# Patient Record
Sex: FEMALE | Race: WHITE | NOT HISPANIC OR LATINO | Employment: FULL TIME | URBAN - METROPOLITAN AREA
[De-identification: names, ages, dates, MRNs, and addresses within clinical notes are randomized per-mention and may not be internally consistent; named-entity substitution may affect disease eponyms.]

---

## 2017-09-13 ENCOUNTER — LAB (OUTPATIENT)
Dept: LAB | Facility: HOSPITAL | Age: 57
End: 2017-09-13

## 2017-09-13 ENCOUNTER — TRANSCRIBE ORDERS (OUTPATIENT)
Dept: LAB | Facility: HOSPITAL | Age: 57
End: 2017-09-13

## 2017-09-13 DIAGNOSIS — E78.00 PURE HYPERCHOLESTEROLEMIA: ICD-10-CM
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
## 2017-09-13 DIAGNOSIS — Q89.01 CONGENITAL ABSENCE OF SPLEEN: ICD-10-CM

## 2017-09-13 DIAGNOSIS — Z00.00 ROUTINE GENERAL MEDICAL EXAMINATION AT A HEALTH CARE FACILITY: ICD-10-CM

## 2017-09-13 DIAGNOSIS — I34.1 MITRAL VALVE PROLAPSE: ICD-10-CM

## 2017-09-13 LAB
ALBUMIN SERPL-MCNC: 4.4 G/DL (ref 3.2–4.8)
ALBUMIN/GLOB SERPL: 1.8 G/DL (ref 1.5–2.5)
ALP SERPL-CCNC: 122 U/L (ref 25–100)
ALT SERPL W P-5'-P-CCNC: 28 U/L (ref 7–40)
ANION GAP SERPL CALCULATED.3IONS-SCNC: 5 MMOL/L (ref 3–11)
ARTICHOKE IGE QN: 138 MG/DL (ref 0–130)
AST SERPL-CCNC: 22 U/L (ref 0–33)
BASOPHILS # BLD AUTO: 0.02 10*3/MM3 (ref 0–0.2)
BASOPHILS NFR BLD AUTO: 0.4 % (ref 0–1)
BILIRUB SERPL-MCNC: 0.9 MG/DL (ref 0.3–1.2)
BUN BLD-MCNC: 13 MG/DL (ref 9–23)
BUN/CREAT SERPL: 21.7 (ref 7–25)
CALCIUM SPEC-SCNC: 9.5 MG/DL (ref 8.7–10.4)
CHLORIDE SERPL-SCNC: 104 MMOL/L (ref 99–109)
CHOLEST SERPL-MCNC: 217 MG/DL (ref 0–200)
CO2 SERPL-SCNC: 29 MMOL/L (ref 20–31)
CREAT BLD-MCNC: 0.6 MG/DL (ref 0.6–1.3)
DEPRECATED RDW RBC AUTO: 49.4 FL (ref 37–54)
EOSINOPHIL # BLD AUTO: 0.11 10*3/MM3 (ref 0–0.3)
EOSINOPHIL NFR BLD AUTO: 2 % (ref 0–3)
ERYTHROCYTE [DISTWIDTH] IN BLOOD BY AUTOMATED COUNT: 15.4 % (ref 11.3–14.5)
GFR SERPL CREATININE-BSD FRML MDRD: 104 ML/MIN/1.73
GLOBULIN UR ELPH-MCNC: 2.5 GM/DL
GLUCOSE BLD-MCNC: 92 MG/DL (ref 70–100)
HCT VFR BLD AUTO: 39.8 % (ref 34.5–44)
HDLC SERPL-MCNC: 59 MG/DL (ref 40–60)
HGB BLD-MCNC: 12.7 G/DL (ref 11.5–15.5)
IMM GRANULOCYTES # BLD: 0.01 10*3/MM3 (ref 0–0.03)
IMM GRANULOCYTES NFR BLD: 0.2 % (ref 0–0.6)
LYMPHOCYTES # BLD AUTO: 2.93 10*3/MM3 (ref 0.6–4.8)
LYMPHOCYTES NFR BLD AUTO: 54.4 % (ref 24–44)
MCH RBC QN AUTO: 28 PG (ref 27–31)
MCHC RBC AUTO-ENTMCNC: 31.9 G/DL (ref 32–36)
MCV RBC AUTO: 87.9 FL (ref 80–99)
MONOCYTES # BLD AUTO: 0.63 10*3/MM3 (ref 0–1)
MONOCYTES NFR BLD AUTO: 11.7 % (ref 0–12)
NEUTROPHILS # BLD AUTO: 1.69 10*3/MM3 (ref 1.5–8.3)
NEUTROPHILS NFR BLD AUTO: 31.3 % (ref 41–71)
PLATELET # BLD AUTO: 428 10*3/MM3 (ref 150–450)
PMV BLD AUTO: 8.6 FL (ref 6–12)
POTASSIUM BLD-SCNC: 4.6 MMOL/L (ref 3.5–5.5)
PROT SERPL-MCNC: 6.9 G/DL (ref 5.7–8.2)
RBC # BLD AUTO: 4.53 10*6/MM3 (ref 3.89–5.14)
SODIUM BLD-SCNC: 138 MMOL/L (ref 132–146)
TRIGL SERPL-MCNC: 116 MG/DL (ref 0–150)
TSH SERPL DL<=0.05 MIU/L-ACNC: 2.12 MIU/ML (ref 0.35–5.35)
WBC NRBC COR # BLD: 5.39 10*3/MM3 (ref 3.5–10.8)

## 2017-09-13 PROCEDURE — 36415 COLL VENOUS BLD VENIPUNCTURE: CPT

## 2017-09-13 PROCEDURE — 84443 ASSAY THYROID STIM HORMONE: CPT

## 2017-09-13 PROCEDURE — 80061 LIPID PANEL: CPT

## 2017-09-13 PROCEDURE — 85025 COMPLETE CBC W/AUTO DIFF WBC: CPT

## 2017-09-13 PROCEDURE — 80053 COMPREHEN METABOLIC PANEL: CPT

## 2017-09-18 ENCOUNTER — TRANSCRIBE ORDERS (OUTPATIENT)
Dept: ADMINISTRATIVE | Facility: HOSPITAL | Age: 57
End: 2017-09-18

## 2017-09-18 DIAGNOSIS — M79.672 HEEL PAIN, BILATERAL: ICD-10-CM

## 2017-09-18 DIAGNOSIS — M79.671 PAIN IN BOTH FEET: Primary | ICD-10-CM

## 2017-09-18 DIAGNOSIS — M79.672 PAIN IN BOTH FEET: Primary | ICD-10-CM

## 2017-09-18 DIAGNOSIS — M79.671 HEEL PAIN, BILATERAL: ICD-10-CM

## 2017-09-29 ENCOUNTER — HOSPITAL ENCOUNTER (OUTPATIENT)
Dept: MRI IMAGING | Facility: HOSPITAL | Age: 57
Discharge: HOME | End: 2017-09-29
Attending: INTERNAL MEDICINE

## 2017-09-29 ENCOUNTER — HOSPITAL ENCOUNTER (OUTPATIENT)
Dept: MRI IMAGING | Facility: HOSPITAL | Age: 57
Discharge: HOME | End: 2017-09-29
Attending: INTERNAL MEDICINE | Admitting: INTERNAL MEDICINE

## 2017-09-29 DIAGNOSIS — M79.671 PAIN IN BOTH FEET: ICD-10-CM

## 2017-09-29 DIAGNOSIS — M79.672 HEEL PAIN, BILATERAL: ICD-10-CM

## 2017-09-29 DIAGNOSIS — M79.671 HEEL PAIN, BILATERAL: ICD-10-CM

## 2017-09-29 DIAGNOSIS — M79.672 PAIN IN BOTH FEET: ICD-10-CM

## 2017-09-29 PROCEDURE — 73718 MRI LOWER EXTREMITY W/O DYE: CPT

## 2017-11-06 ENCOUNTER — OFFICE VISIT (OUTPATIENT)
Dept: ORTHOPEDIC SURGERY | Facility: CLINIC | Age: 57
End: 2017-11-06

## 2017-11-06 VITALS
SYSTOLIC BLOOD PRESSURE: 131 MMHG | WEIGHT: 162 LBS | DIASTOLIC BLOOD PRESSURE: 92 MMHG | HEART RATE: 77 BPM | HEIGHT: 62 IN | BODY MASS INDEX: 29.81 KG/M2

## 2017-11-06 DIAGNOSIS — M79.671 FOOT PAIN, BILATERAL: ICD-10-CM

## 2017-11-06 DIAGNOSIS — M79.672 FOOT PAIN, BILATERAL: ICD-10-CM

## 2017-11-06 DIAGNOSIS — I87.8 VENOUS STASIS: Primary | ICD-10-CM

## 2017-11-06 PROCEDURE — 99213 OFFICE O/P EST LOW 20 MIN: CPT | Performed by: ORTHOPAEDIC SURGERY

## 2017-11-06 RX ORDER — RISEDRONATE SODIUM 150 MG/1
150 TABLET, FILM COATED ORAL
COMMUNITY
End: 2019-01-14 | Stop reason: SDUPTHER

## 2017-11-06 NOTE — PROGRESS NOTES
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  She is here noting that she still has a lot of pain in the posterior heels, cannot rest them on anything (such as in a recliner at night or in bed) as well as some generalized pain on the bottom of both feet.  She has good days and bad, worse with activity, better with rest.  She does not have the severe morning pain that she had initially.  She is still doing stretching, wears good shoes.  She is wondering if anything else can be done.  She has had an MRI of both feet, I looked at them.  They are basically normal, they show the subcutaneous edema as expected.  On the right there is a cyst in the neck of the calcaneus with some edema, I do not think this is causing symptoms, she does not report pain in that area.  (it is in the region of the sinus tarsi)    Current Outpatient Prescriptions on File Prior to Visit   Medication Sig Dispense Refill   • aspirin 81 MG tablet Take 81 mg by mouth Take As Directed.     • Cetirizine HCl (ZYRTEC ALLERGY) 10 MG capsule Take 10 mg by mouth Take As Directed.     • Coenzyme Q10 (COQ10) 200 MG capsule Take 200 mg by mouth Take As Directed.     • esomeprazole (nexIUM) 40 MG capsule Take 40 mg by mouth Take As Directed.     • MULTIPLE VITAMIN PO Take 1 tablet by mouth Take As Directed.     • nebivolol (BYSTOLIC) 10 MG tablet Take 10 mg by mouth Take As Directed.     • pravastatin (PRAVACHOL) 40 MG tablet Take 40 mg by mouth Take As Directed.     • VITAMIN D, CHOLECALCIFEROL, PO Take 5,000 Units by mouth Take As Directed.     • cephalexin (KEFLEX) 500 MG capsule Take 500 mg by mouth Take As Directed.      • sulfamethoxazole-trimethoprim (BACTRIM DS,SEPTRA DS) 800-160 MG per tablet Take 1 tablet by mouth Take As Directed.     • torsemide (DEMADEX) 10 MG tablet Take 10 mg by mouth Take As Directed.     • valACYclovir (VALTREX) 1000 MG tablet Take 1,000 mg by mouth Take As Directed.       No current facility-administered medications on file prior to visit.       Allergies   Allergen Reactions   • Dexlansoprazole    • Bactrim [Sulfamethoxazole-Trimethoprim] Itching and Rash   • Other Itching and Rash     Kapidex      Past Medical History:   Diagnosis Date   • Achilles tendonitis    • Cancer    • Esophagitis    • Gastrointestinal disorder    • Hepatitis    • Osteoarthritis    • Osteoporosis    • Plantar fasciitis    • Skin disorder    • Splenic infarct      Past Surgical History:   Procedure Laterality Date   •  SECTION     • CHOLECYSTECTOMY     • HYSTERECTOMY     • LAPAROSCOPIC TUBAL LIGATION     • SPLENECTOMY       Family History   Problem Relation Age of Onset   • Heart attack Mother    • Hypertension Mother    • Stroke Mother    • Stroke Father    • Heart attack Other    • Heart disease Other    • Stroke Other    • Depression Other    • Hypertension Other       Social History     Social History   • Marital status:      Spouse name: N/A   • Number of children: N/A   • Years of education: N/A     Occupational History   • Not on file.     Social History Main Topics   • Smoking status: Former Smoker     Packs/day: 0.50     Years: 10.00   • Smokeless tobacco: Never Used   • Alcohol use No   • Drug use: No   • Sexual activity: Not on file     Other Topics Concern   • Not on file     Social History Narrative        Review of Systems   Constitutional: Positive for fatigue.   HENT: Negative.    Eyes: Negative.    Respiratory: Negative.    Cardiovascular: Negative.    Gastrointestinal: Negative.    Endocrine: Negative.    Genitourinary: Negative.    Musculoskeletal: Positive for arthralgias.   Skin: Negative.   "  Allergic/Immunologic: Positive for environmental allergies.   Neurological: Negative.    Hematological: Negative.    Psychiatric/Behavioral: Negative.        The following portions of the patient's history were reviewed and updated as appropriate: allergies, current medications, past family history, past medical history, past social history, past surgical history and problem list.    Physical Exam:   /92  Pulse 77  Ht 61.75\" (156.8 cm)  Wt 162 lb (73.5 kg)  BMI 29.87 kg/m2  GENERAL: Body habitus: overweight    Lower extremity edema: Left: 1+ pitting; Right: 1+ pitting    Varicose veins:  Left: mild; Right: mild    Gait: normal     Mental Status:  awake and alert; oriented to person, place, and time    Voice:  clear  SKIN:  Normal    Hair Growth:  Right:normal; Left:  normal  NAILS: Toenails: normal    CV:  Dorsalis Pedis:  Right: 2+; Left:2+    Posterior Tibial: Right:2+; Left:2+    Capillary Refill:  Brisk  MSK:      Ankle:  Right: non tender; Left:  non tender      Foot:  Right:  non tender except at the posterior heel, but no swelling, no bursitis; Left:  non tender  except at the posterior heel, but no swelling, no bursitis;      NEURO:   Lower extremity sensation: intact            Medical Decision Making    Data Review:   reviewed radiology images    and reviewed radiology results       Assessment and Plan/ Diagnosis/Treatment options:   1. Venous stasis  I think this plays a role in the generalized pain, she should definitely continue to wear support stockings    2. Foot pain, bilateral  She does not seem to have plantar fasciitis right now.  It is reassuring that the MRI's are normal.  I think the posterior heel pain is irritation of the insertion of the Achilles but without full-blown tendinitis, no swelling, no bursitis.  I suggested she order \"Heelbows\" from [employer], which can pad and protect the area.  We also discussed shoes, open backed type, etc.  We also discussed weight loss, which might " "help.   I do not know of anything else that can change this for her.  There is no surgery that will be beneficial, and I do not think orthotics will help.I would recommend she continue to stretch.  She asked why she hurts and other people don't, and I explained we cannot always identify nor \"cure\" tenderness.  At least we can do studies to make sure there is no malignant or dangerous source for pain.   We discussed all of the above in detail, I will be happy to see her any time.                        "

## 2017-12-15 ENCOUNTER — HOSPITAL ENCOUNTER (OUTPATIENT)
Dept: GENERAL RADIOLOGY | Facility: HOSPITAL | Age: 57
Discharge: HOME | End: 2017-12-15
Admitting: NURSE PRACTITIONER

## 2017-12-15 ENCOUNTER — TRANSCRIBE ORDERS (OUTPATIENT)
Dept: ADMINISTRATIVE | Facility: HOSPITAL | Age: 57
End: 2017-12-15

## 2017-12-15 DIAGNOSIS — M54.6 THORACIC SPINE PAIN: Primary | ICD-10-CM

## 2017-12-15 PROCEDURE — 72072 X-RAY EXAM THORAC SPINE 3VWS: CPT

## 2017-12-21 ENCOUNTER — TRANSCRIBE ORDERS (OUTPATIENT)
Dept: ADMINISTRATIVE | Facility: HOSPITAL | Age: 57
End: 2017-12-21

## 2017-12-21 DIAGNOSIS — M54.10 RADICULOPATHY AFFECTING UPPER EXTREMITY: ICD-10-CM

## 2017-12-21 DIAGNOSIS — R07.9 CHEST PAIN, UNSPECIFIED TYPE: Primary | ICD-10-CM

## 2017-12-28 ENCOUNTER — HOSPITAL ENCOUNTER (OUTPATIENT)
Dept: MRI IMAGING | Facility: HOSPITAL | Age: 57
Discharge: HOME | End: 2017-12-28
Admitting: NURSE PRACTITIONER

## 2017-12-28 DIAGNOSIS — M54.10 RADICULOPATHY AFFECTING UPPER EXTREMITY: ICD-10-CM

## 2017-12-28 DIAGNOSIS — R07.9 CHEST PAIN, UNSPECIFIED TYPE: ICD-10-CM

## 2017-12-28 PROCEDURE — 72146 MRI CHEST SPINE W/O DYE: CPT

## 2018-04-11 ENCOUNTER — APPOINTMENT (OUTPATIENT)
Dept: LAB | Facility: HOSPITAL | Age: 58
End: 2018-04-11

## 2018-04-11 ENCOUNTER — OFFICE VISIT (OUTPATIENT)
Dept: NEUROSURGERY | Facility: CLINIC | Age: 58
End: 2018-04-11

## 2018-04-11 VITALS — BODY MASS INDEX: 26.46 KG/M2 | WEIGHT: 143.8 LBS | TEMPERATURE: 97.8 F | HEIGHT: 62 IN

## 2018-04-11 DIAGNOSIS — M54.9 MECHANICAL BACK PAIN: Primary | ICD-10-CM

## 2018-04-11 DIAGNOSIS — M95.9: ICD-10-CM

## 2018-04-11 DIAGNOSIS — M53.9 MULTILEVEL DEGENERATIVE DISC DISEASE: ICD-10-CM

## 2018-04-11 DIAGNOSIS — R19.7 DIARRHEA, UNSPECIFIED TYPE: ICD-10-CM

## 2018-04-11 DIAGNOSIS — D73.5 SPLENIC INFARCT: ICD-10-CM

## 2018-04-11 DIAGNOSIS — D18.09 HEMANGIOMA OF VERTEBRAL BODY: ICD-10-CM

## 2018-04-11 DIAGNOSIS — R10.30 LOWER ABDOMINAL PAIN: ICD-10-CM

## 2018-04-11 DIAGNOSIS — K50.019 TERMINAL ILEITIS WITH COMPLICATION (HCC): Primary | ICD-10-CM

## 2018-04-11 PROCEDURE — 99243 OFF/OP CNSLTJ NEW/EST LOW 30: CPT | Performed by: NEUROLOGICAL SURGERY

## 2018-04-11 PROCEDURE — 36415 COLL VENOUS BLD VENIPUNCTURE: CPT

## 2018-04-11 RX ORDER — CYCLOBENZAPRINE HCL 10 MG
10 TABLET ORAL 2 TIMES DAILY PRN
COMMUNITY
End: 2019-01-23

## 2018-04-11 RX ORDER — CLOPIDOGREL BISULFATE 75 MG/1
75 TABLET ORAL DAILY
COMMUNITY
End: 2019-06-07 | Stop reason: SDUPTHER

## 2018-04-11 NOTE — PROGRESS NOTES
Patient: Antonieta Howe  : 1960    Primary Care Provider: Tavo Valderrama MD    Requesting Provider: As above        History    Chief Complaint: Mid back pain.    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  She had severe pain at her bra line and it took 2 months to recover from that.  If she uses her arms then her pain tends to flareup even at this point in time.  She had a severe acute episode in May of last year and then another in 2017.  These spells took 2-4 weeks to settle down.  Sometimes she gets some numbness in her arms at night.  She has no chest or abdominal symptoms.  She denies leg symptoms.  She's better lying down and stretching.  Any use of her arms with lifting or twisting accentuates her symptoms.  She does have a history of osteoporosis.    Review of Systems   Constitutional: Negative for activity change, appetite change, chills, diaphoresis, fatigue, fever and unexpected weight change.   HENT: Positive for congestion and postnasal drip. Negative for dental problem, drooling, ear discharge, ear pain, facial swelling, hearing loss, mouth sores, nosebleeds, rhinorrhea, sinus pressure, sneezing, sore throat, tinnitus, trouble swallowing and voice change.    Eyes: Negative for photophobia, pain, discharge, redness, itching and visual disturbance.   Respiratory: Positive for cough. Negative for apnea, choking, chest tightness, shortness of breath, wheezing and stridor.    Cardiovascular: Positive for palpitations. Negative for chest pain and leg swelling.   Gastrointestinal: Positive for abdominal pain. Negative for abdominal distention, anal bleeding, blood in stool, constipation, diarrhea, nausea, rectal pain and vomiting.   Endocrine: Negative for cold intolerance, heat intolerance, polydipsia, polyphagia and polyuria.   Genitourinary: Negative for decreased urine  "volume, difficulty urinating, dysuria, enuresis, flank pain, frequency, genital sores, hematuria and urgency.   Musculoskeletal: Positive for arthralgias, back pain, joint swelling and myalgias. Negative for gait problem, neck pain and neck stiffness.   Skin: Negative for color change, pallor, rash and wound.   Allergic/Immunologic: Positive for environmental allergies. Negative for food allergies and immunocompromised state.   Neurological: Negative for dizziness, tremors, seizures, syncope, facial asymmetry, speech difficulty, weakness, light-headedness, numbness and headaches.   Hematological: Negative for adenopathy. Does not bruise/bleed easily.   Psychiatric/Behavioral: Negative for agitation, behavioral problems, confusion, decreased concentration, dysphoric mood, hallucinations, self-injury, sleep disturbance and suicidal ideas. The patient is not nervous/anxious and is not hyperactive.        The patient's past medical history, past surgical history, family history, and social history have been reviewed at length in the electronic medical record.    Physical Exam:   Temp 97.8 °F (36.6 °C) (Temporal Artery )   Ht 156.2 cm (61.5\")   Wt 65.2 kg (143 lb 12.8 oz)   BMI 26.73 kg/m²   CONSTITUTIONAL: Patient is well-nourished, pleasant and appears stated age.  CV: Heart regular rate and rhythm without murmur, rub, or gallop.  PULMONARY: Lungs are clear to ascultation.  MUSCULOSKELETAL:  Neck tenderness to palpation is not observed.  There is no tenderness to palpation in her mid back.  ROM in neck is normal.  NEUROLOGICAL:  Orientation, memory, attention span, language function, and cognition have been examined and are intact.  Strength is intact in the upper and lower extremities to direct testing.  Muscle tone is normal throughout.  Station and gait are normal.  Sensation is intact to light touch testing throughout.  Deep tendon reflexes are 1+ and symmetrical.  July's Sign is negative bilaterally. No " clonus is elicited.  Coordination is intact.      Medical Decision Making    Data Review:   MRI of the thoracic spine demonstrates an old wedge deformity of T7.  There is a defect in the upper endplate of T7 that could represent a Schmorl's node or potentially sequela of the fracture.  There is a hemangioma within the T8 vertebral body.    Diagnosis:   I suspect that the patient fractured her T7 vertebrae during the incident of January 2015.  She has some ongoing mechanical back pain.    Treatment Options:   She is already doing some home stretches and exercises and limiting her activity as necessary.  Unfortunately I don't think there is a simple solution for what afflicts her.  Treatment will need to remain symptomatic.       Diagnosis Plan   1. Mechanical back pain     2. Fracture deformity      T7   3. Hemangioma of vertebral body     4. Multilevel degenerative disc disease         Scribed for Sumeet Herron MD by Anat Hernandez CMA on 04/11/2018 at 1:53 PM    I, Dr. Herron, personally performed the services described in the documentation, as scribed in my presence, and it is both accurate and complete.

## 2018-04-19 LAB — REF LAB TEST METHOD: NORMAL

## 2018-05-01 ENCOUNTER — TRANSCRIBE ORDERS (OUTPATIENT)
Dept: MAMMOGRAPHY | Facility: HOSPITAL | Age: 58
End: 2018-05-01

## 2018-05-01 DIAGNOSIS — Z12.31 VISIT FOR SCREENING MAMMOGRAM: Primary | ICD-10-CM

## 2018-05-04 ENCOUNTER — APPOINTMENT (OUTPATIENT)
Dept: MAMMOGRAPHY | Facility: HOSPITAL | Age: 58
End: 2018-05-04
Attending: INTERNAL MEDICINE

## 2018-05-24 ENCOUNTER — APPOINTMENT (OUTPATIENT)
Dept: MAMMOGRAPHY | Facility: HOSPITAL | Age: 58
End: 2018-05-24
Attending: INTERNAL MEDICINE

## 2018-06-08 ENCOUNTER — HOSPITAL ENCOUNTER (OUTPATIENT)
Dept: MAMMOGRAPHY | Facility: HOSPITAL | Age: 58
Discharge: HOME | End: 2018-06-08
Attending: INTERNAL MEDICINE | Admitting: INTERNAL MEDICINE

## 2018-06-08 DIAGNOSIS — Z12.31 VISIT FOR SCREENING MAMMOGRAM: ICD-10-CM

## 2018-06-08 PROCEDURE — 77063 BREAST TOMOSYNTHESIS BI: CPT

## 2018-06-08 PROCEDURE — 77063 BREAST TOMOSYNTHESIS BI: CPT | Performed by: RADIOLOGY

## 2018-06-08 PROCEDURE — 77067 SCR MAMMO BI INCL CAD: CPT

## 2018-06-08 PROCEDURE — 77067 SCR MAMMO BI INCL CAD: CPT | Performed by: RADIOLOGY

## 2018-12-13 ENCOUNTER — LAB (OUTPATIENT)
Dept: LAB | Facility: HOSPITAL | Age: 58
End: 2018-12-13

## 2018-12-13 ENCOUNTER — OFFICE VISIT (OUTPATIENT)
Dept: FAMILY MEDICINE CLINIC | Facility: CLINIC | Age: 58
End: 2018-12-13

## 2018-12-13 ENCOUNTER — HOSPITAL ENCOUNTER (OUTPATIENT)
Dept: GENERAL RADIOLOGY | Facility: HOSPITAL | Age: 58
Discharge: HOME | End: 2018-12-13
Admitting: INTERNAL MEDICINE

## 2018-12-13 ENCOUNTER — TELEPHONE (OUTPATIENT)
Dept: FAMILY MEDICINE CLINIC | Facility: CLINIC | Age: 58
End: 2018-12-13

## 2018-12-13 VITALS
BODY MASS INDEX: 24.55 KG/M2 | TEMPERATURE: 98.8 F | SYSTOLIC BLOOD PRESSURE: 122 MMHG | HEIGHT: 62 IN | DIASTOLIC BLOOD PRESSURE: 84 MMHG | OXYGEN SATURATION: 95 % | HEART RATE: 88 BPM | WEIGHT: 133.4 LBS

## 2018-12-13 DIAGNOSIS — J02.9 SORE THROAT: ICD-10-CM

## 2018-12-13 DIAGNOSIS — Z00.00 PREVENTATIVE HEALTH CARE: ICD-10-CM

## 2018-12-13 DIAGNOSIS — J40 BRONCHITIS: ICD-10-CM

## 2018-12-13 DIAGNOSIS — R50.9 FEVER AND CHILLS: ICD-10-CM

## 2018-12-13 DIAGNOSIS — J40 BRONCHITIS: Primary | ICD-10-CM

## 2018-12-13 LAB
25(OH)D3 SERPL-MCNC: 53.7 NG/ML
ALBUMIN SERPL-MCNC: 4.96 G/DL (ref 3.2–4.8)
ALBUMIN/GLOB SERPL: 2.6 G/DL (ref 1.5–2.5)
ALP SERPL-CCNC: 95 U/L (ref 25–100)
ALT SERPL W P-5'-P-CCNC: 30 U/L (ref 7–40)
ANION GAP SERPL CALCULATED.3IONS-SCNC: 9 MMOL/L (ref 3–11)
ARTICHOKE IGE QN: 92 MG/DL (ref 0–130)
AST SERPL-CCNC: 23 U/L (ref 0–33)
BASOPHILS # BLD AUTO: 0.03 10*3/MM3 (ref 0–0.2)
BASOPHILS NFR BLD AUTO: 0.3 % (ref 0–1)
BILIRUB SERPL-MCNC: 0.8 MG/DL (ref 0.3–1.2)
BUN BLD-MCNC: 11 MG/DL (ref 9–23)
BUN/CREAT SERPL: 15.7 (ref 7–25)
CALCIUM SPEC-SCNC: 9.8 MG/DL (ref 8.7–10.4)
CHLORIDE SERPL-SCNC: 103 MMOL/L (ref 99–109)
CHOLEST SERPL-MCNC: 169 MG/DL (ref 0–200)
CO2 SERPL-SCNC: 28 MMOL/L (ref 20–31)
CREAT BLD-MCNC: 0.7 MG/DL (ref 0.6–1.3)
DEPRECATED RDW RBC AUTO: 47.7 FL (ref 37–54)
EOSINOPHIL # BLD AUTO: 0.15 10*3/MM3 (ref 0–0.3)
EOSINOPHIL NFR BLD AUTO: 1.3 % (ref 0–3)
ERYTHROCYTE [DISTWIDTH] IN BLOOD BY AUTOMATED COUNT: 14.2 % (ref 11.3–14.5)
EXPIRATION DATE: NORMAL
EXPIRATION DATE: NORMAL
FLUAV AG NPH QL: NEGATIVE
FLUBV AG NPH QL: NEGATIVE
GFR SERPL CREATININE-BSD FRML MDRD: 87 ML/MIN/1.73
GLOBULIN UR ELPH-MCNC: 1.9 GM/DL
GLUCOSE BLD-MCNC: 87 MG/DL (ref 70–100)
HBA1C MFR BLD: 5.4 % (ref 4.8–5.6)
HCT VFR BLD AUTO: 43 % (ref 34.5–44)
HDLC SERPL-MCNC: 67 MG/DL (ref 40–60)
HGB BLD-MCNC: 13.9 G/DL (ref 11.5–15.5)
IMM GRANULOCYTES # BLD: 0.03 10*3/MM3 (ref 0–0.03)
IMM GRANULOCYTES NFR BLD: 0.3 % (ref 0–0.6)
INTERNAL CONTROL: NORMAL
INTERNAL CONTROL: NORMAL
LYMPHOCYTES # BLD AUTO: 4.45 10*3/MM3 (ref 0.6–4.8)
LYMPHOCYTES NFR BLD AUTO: 37.9 % (ref 24–44)
Lab: NORMAL
Lab: NORMAL
MCH RBC QN AUTO: 29.4 PG (ref 27–31)
MCHC RBC AUTO-ENTMCNC: 32.3 G/DL (ref 32–36)
MCV RBC AUTO: 91.1 FL (ref 80–99)
MONOCYTES # BLD AUTO: 1.43 10*3/MM3 (ref 0–1)
MONOCYTES NFR BLD AUTO: 12.2 % (ref 0–12)
NEUTROPHILS # BLD AUTO: 5.66 10*3/MM3 (ref 1.5–8.3)
NEUTROPHILS NFR BLD AUTO: 48 % (ref 41–71)
PLATELET # BLD AUTO: 434 10*3/MM3 (ref 150–450)
PMV BLD AUTO: 8.7 FL (ref 6–12)
POTASSIUM BLD-SCNC: 3.9 MMOL/L (ref 3.5–5.5)
PROT SERPL-MCNC: 6.9 G/DL (ref 5.7–8.2)
RBC # BLD AUTO: 4.72 10*6/MM3 (ref 3.89–5.14)
S PYO AG THROAT QL: NEGATIVE
SODIUM BLD-SCNC: 140 MMOL/L (ref 132–146)
TRIGL SERPL-MCNC: 120 MG/DL (ref 0–150)
WBC NRBC COR # BLD: 11.75 10*3/MM3 (ref 3.5–10.8)

## 2018-12-13 PROCEDURE — 87804 INFLUENZA ASSAY W/OPTIC: CPT | Performed by: INTERNAL MEDICINE

## 2018-12-13 PROCEDURE — 71046 X-RAY EXAM CHEST 2 VIEWS: CPT

## 2018-12-13 PROCEDURE — 82306 VITAMIN D 25 HYDROXY: CPT | Performed by: INTERNAL MEDICINE

## 2018-12-13 PROCEDURE — 80053 COMPREHEN METABOLIC PANEL: CPT | Performed by: INTERNAL MEDICINE

## 2018-12-13 PROCEDURE — 83036 HEMOGLOBIN GLYCOSYLATED A1C: CPT | Performed by: INTERNAL MEDICINE

## 2018-12-13 PROCEDURE — 80061 LIPID PANEL: CPT | Performed by: INTERNAL MEDICINE

## 2018-12-13 PROCEDURE — 99204 OFFICE O/P NEW MOD 45 MIN: CPT | Performed by: INTERNAL MEDICINE

## 2018-12-13 PROCEDURE — 87880 STREP A ASSAY W/OPTIC: CPT | Performed by: INTERNAL MEDICINE

## 2018-12-13 PROCEDURE — 85025 COMPLETE CBC W/AUTO DIFF WBC: CPT | Performed by: INTERNAL MEDICINE

## 2018-12-13 RX ORDER — AZITHROMYCIN 250 MG/1
TABLET, FILM COATED ORAL
Qty: 6 TABLET | Refills: 0 | Status: SHIPPED | OUTPATIENT
Start: 2018-12-13 | End: 2019-01-14

## 2018-12-13 RX ORDER — GUAIFENESIN AND DEXTROMETHORPHAN HYDROBROMIDE 600; 30 MG/1; MG/1
2 TABLET, EXTENDED RELEASE ORAL EVERY 12 HOURS SCHEDULED
Qty: 28 TABLET | Refills: 0 | Status: SHIPPED | OUTPATIENT
Start: 2018-12-13 | End: 2018-12-20

## 2018-12-13 RX ORDER — ALBUTEROL SULFATE 90 UG/1
2 AEROSOL, METERED RESPIRATORY (INHALATION) EVERY 4 HOURS PRN
COMMUNITY
End: 2022-02-10 | Stop reason: SDUPTHER

## 2018-12-13 NOTE — PROGRESS NOTES
Chief Complaint:  Sore throat, cough, asthma    HPI:  Antonieta Howe is a 58 y.o. female who presents today for 1 week sore throat, SOB, cough and wheezing. She has a history of asthma but does not take any inhalers unless she is sick. She was prescribed steroids last Friday and has not felt any better since then. She has been taking mucinex daily. She has chills at home but no documented fevers. She is concerned that she needs antibiotics.     ROS:  Constitutional: no fevers, night sweats or unexplained weight loss  Eyes: no vision changes  ENT: no runny nose, ear pain, +sore throat  Cardio: no chest pain, palpitations  Pulm: +shortness of breath, +wheezing, +cough  GI: no abdominal pain or changes in bowel movements  : no difficulty urinating  MSK: no difficulty ambulating, no joint pain  Neuro: no weakness, dizziness or headache  Psych: no trouble sleeping  Endo: no change in appetite      Past Medical History:   Diagnosis Date   • Achilles tendonitis    • Cancer (CMS/HCC)    • Esophagitis    • Gastrointestinal disorder    • Hepatitis    • Hyperlipidemia    • Osteoarthritis    • Osteoporosis    • Plantar fasciitis    • Skin disorder    • Splenic infarct       Family History   Problem Relation Age of Onset   • Heart attack Mother    • Hypertension Mother    • Stroke Mother    • Stroke Father    • Heart attack Other    • Heart disease Other    • Stroke Other    • Depression Other    • Hypertension Other    • Breast cancer Neg Hx    • Ovarian cancer Neg Hx       Social History     Socioeconomic History   • Marital status:      Spouse name: Not on file   • Number of children: Not on file   • Years of education: Not on file   • Highest education level: Not on file   Social Needs   • Financial resource strain: Not on file   • Food insecurity - worry: Not on file   • Food insecurity - inability: Not on file   • Transportation needs - medical: Not on file   • Transportation needs - non-medical: Not on file    Occupational History   • Not on file   Tobacco Use   • Smoking status: Former Smoker     Packs/day: 0.50     Years: 10.00     Pack years: 5.00     Last attempt to quit:      Years since quittin.9   • Smokeless tobacco: Never Used   Substance and Sexual Activity   • Alcohol use: No   • Drug use: No   • Sexual activity: Defer   Other Topics Concern   • Not on file   Social History Narrative   • Not on file      Allergies   Allergen Reactions   • Dexlansoprazole    • Bactrim [Sulfamethoxazole-Trimethoprim] Itching and Rash   • Other Itching and Rash     Kapidex        There is no immunization history on file for this patient.     PE:  Vitals:    18 1132   BP: 122/84   Pulse: 88   Temp: 98.8 °F (37.1 °C)   SpO2: 95%        Gen Appearance: NAD  HEENT: Normocephalic, PERRLA, no thyromegaly, trache midline, erythematous pharynx, no exudates  Heart: RRR, normal S1 and S2, no murmur  Lungs: CTA b/l, no wheezing, no crackles  Abdomen: Soft, non-tender, non-distended, no guarding and BSx4  MSK: Moves all extremities well, normal gait, no peripheral edema  Pulses: Palpable and equal b/l  Lymph nodes: No palpable lymphadenopathy   Neuro: No focal deficits      Current Outpatient Medications   Medication Sig Dispense Refill   • albuterol 108 (90 Base) MCG/ACT inhaler Inhale 2 puffs Every 4 (Four) Hours As Needed for Wheezing.     • clopidogrel (PLAVIX) 75 MG tablet Take 75 mg by mouth Daily.     • Coenzyme Q10 (COQ10) 200 MG capsule Take 200 mg by mouth Take As Directed.     • esomeprazole (nexIUM) 40 MG capsule Take 40 mg by mouth Take As Directed.     • MULTIPLE VITAMIN PO Take 1 tablet by mouth Take As Directed.     • nebivolol (BYSTOLIC) 10 MG tablet Take 10 mg by mouth Take As Directed.     • pravastatin (PRAVACHOL) 40 MG tablet Take 40 mg by mouth Take As Directed.     • risedronate (ACTONEL) 150 MG tablet Take 150 mg by mouth Every 30 (Thirty) Days. with water on empty stomach, nothing by mouth or lie down  for next 30 minutes.     • torsemide (DEMADEX) 10 MG tablet Take 10 mg by mouth Take As Directed.     • VITAMIN D, CHOLECALCIFEROL, PO Take 5,000 Units by mouth Take As Directed.     • azithromycin (ZITHROMAX) 250 MG tablet Take 2 tablets the first day, then 1 tablet daily for 4 days. 6 tablet 0   • cephalexin (KEFLEX) 500 MG capsule Take 500 mg by mouth Take As Directed.     • Cetirizine HCl (ZYRTEC ALLERGY) 10 MG capsule Take 10 mg by mouth Take As Directed.     • cyclobenzaprine (FLEXERIL) 10 MG tablet Take 10 mg by mouth 2 (Two) Times a Day As Needed for Muscle Spasms.     • guaifenesin-dextromethorphan (MUCINEX DM)  MG tablet sustained-release 12 hour tablet Take 2 tablets by mouth Every 12 (Twelve) Hours for 7 days. 28 tablet 0   • sulfamethoxazole-trimethoprim (BACTRIM DS,SEPTRA DS) 800-160 MG per tablet Take 1 tablet by mouth Take As Directed.     • valACYclovir (VALTREX) 1000 MG tablet Take 1,000 mg by mouth Take As Directed.       No current facility-administered medications for this visit.         Antonieta was seen today for establish care and possible bronchitis.    Diagnoses and all orders for this visit:    Bronchitis  -     XR Chest PA & Lateral; Future  -     CBC & Differential; Future  -     azithromycin (ZITHROMAX) 250 MG tablet; Take 2 tablets the first day, then 1 tablet daily for 4 days.  -     guaifenesin-dextromethorphan (MUCINEX DM)  MG tablet sustained-release 12 hour tablet; Take 2 tablets by mouth Every 12 (Twelve) Hours for 7 days.  Check CXR, cont steroids, cover for atypical pneumonia with azitho. Checking lab work today. RTC 1 week if no better.  Preventative health care  -     Comprehensive Metabolic Panel; Future  -     Lipid Panel; Future  -     Vitamin D 25 Hydroxy; Future  -     Hemoglobin A1c; Future  Checking screening labs before physcial next months.        Return in about 1 month (around 1/13/2019).

## 2018-12-14 DIAGNOSIS — J45.909 ASTHMA, UNSPECIFIED ASTHMA SEVERITY, UNSPECIFIED WHETHER COMPLICATED, UNSPECIFIED WHETHER PERSISTENT: Primary | ICD-10-CM

## 2019-01-14 ENCOUNTER — LAB (OUTPATIENT)
Dept: LAB | Facility: HOSPITAL | Age: 59
End: 2019-01-14

## 2019-01-14 ENCOUNTER — OFFICE VISIT (OUTPATIENT)
Dept: FAMILY MEDICINE CLINIC | Facility: CLINIC | Age: 59
End: 2019-01-14

## 2019-01-14 VITALS
BODY MASS INDEX: 24.11 KG/M2 | TEMPERATURE: 97.8 F | DIASTOLIC BLOOD PRESSURE: 74 MMHG | OXYGEN SATURATION: 99 % | SYSTOLIC BLOOD PRESSURE: 118 MMHG | HEIGHT: 62 IN | WEIGHT: 131 LBS

## 2019-01-14 DIAGNOSIS — M81.0 OSTEOPOROSIS, UNSPECIFIED OSTEOPOROSIS TYPE, UNSPECIFIED PATHOLOGICAL FRACTURE PRESENCE: ICD-10-CM

## 2019-01-14 DIAGNOSIS — K57.92 DIVERTICULITIS: Primary | ICD-10-CM

## 2019-01-14 DIAGNOSIS — K57.92 DIVERTICULITIS: ICD-10-CM

## 2019-01-14 LAB
ALBUMIN SERPL-MCNC: 4.56 G/DL (ref 3.2–4.8)
ALBUMIN/GLOB SERPL: 2.5 G/DL (ref 1.5–2.5)
ALP SERPL-CCNC: 98 U/L (ref 25–100)
ALT SERPL W P-5'-P-CCNC: 35 U/L (ref 7–40)
ANION GAP SERPL CALCULATED.3IONS-SCNC: 5 MMOL/L (ref 3–11)
AST SERPL-CCNC: 25 U/L (ref 0–33)
BASOPHILS # BLD AUTO: 0.04 10*3/MM3 (ref 0–0.2)
BASOPHILS NFR BLD AUTO: 0.7 % (ref 0–1)
BILIRUB SERPL-MCNC: 1 MG/DL (ref 0.3–1.2)
BUN BLD-MCNC: 12 MG/DL (ref 9–23)
BUN/CREAT SERPL: 17.4 (ref 7–25)
CALCIUM SPEC-SCNC: 9.7 MG/DL (ref 8.7–10.4)
CHLORIDE SERPL-SCNC: 104 MMOL/L (ref 99–109)
CO2 SERPL-SCNC: 29 MMOL/L (ref 20–31)
CREAT BLD-MCNC: 0.69 MG/DL (ref 0.6–1.3)
DEPRECATED RDW RBC AUTO: 47.4 FL (ref 37–54)
EOSINOPHIL # BLD AUTO: 0.05 10*3/MM3 (ref 0–0.3)
EOSINOPHIL NFR BLD AUTO: 0.9 % (ref 0–3)
ERYTHROCYTE [DISTWIDTH] IN BLOOD BY AUTOMATED COUNT: 14.1 % (ref 11.3–14.5)
GFR SERPL CREATININE-BSD FRML MDRD: 88 ML/MIN/1.73
GLOBULIN UR ELPH-MCNC: 1.8 GM/DL
GLUCOSE BLD-MCNC: 88 MG/DL (ref 70–100)
HCT VFR BLD AUTO: 43.8 % (ref 34.5–44)
HGB BLD-MCNC: 14 G/DL (ref 11.5–15.5)
IMM GRANULOCYTES # BLD AUTO: 0 10*3/MM3 (ref 0–0.03)
IMM GRANULOCYTES NFR BLD AUTO: 0 % (ref 0–0.6)
LYMPHOCYTES # BLD AUTO: 2.87 10*3/MM3 (ref 0.6–4.8)
LYMPHOCYTES NFR BLD AUTO: 51.5 % (ref 24–44)
MCH RBC QN AUTO: 29.3 PG (ref 27–31)
MCHC RBC AUTO-ENTMCNC: 32 G/DL (ref 32–36)
MCV RBC AUTO: 91.6 FL (ref 80–99)
MONOCYTES # BLD AUTO: 0.68 10*3/MM3 (ref 0–1)
MONOCYTES NFR BLD AUTO: 12.2 % (ref 0–12)
NEUTROPHILS # BLD AUTO: 1.93 10*3/MM3 (ref 1.5–8.3)
NEUTROPHILS NFR BLD AUTO: 34.7 % (ref 41–71)
PLATELET # BLD AUTO: 413 10*3/MM3 (ref 150–450)
PMV BLD AUTO: 8.7 FL (ref 6–12)
POTASSIUM BLD-SCNC: 4.1 MMOL/L (ref 3.5–5.5)
PROT SERPL-MCNC: 6.4 G/DL (ref 5.7–8.2)
RBC # BLD AUTO: 4.78 10*6/MM3 (ref 3.89–5.14)
SODIUM BLD-SCNC: 138 MMOL/L (ref 132–146)
WBC NRBC COR # BLD: 5.57 10*3/MM3 (ref 3.5–10.8)

## 2019-01-14 PROCEDURE — 36415 COLL VENOUS BLD VENIPUNCTURE: CPT

## 2019-01-14 PROCEDURE — 85025 COMPLETE CBC W/AUTO DIFF WBC: CPT | Performed by: INTERNAL MEDICINE

## 2019-01-14 PROCEDURE — 80053 COMPREHEN METABOLIC PANEL: CPT | Performed by: INTERNAL MEDICINE

## 2019-01-14 PROCEDURE — 99213 OFFICE O/P EST LOW 20 MIN: CPT | Performed by: INTERNAL MEDICINE

## 2019-01-14 RX ORDER — RISEDRONATE SODIUM 150 MG/1
150 TABLET, FILM COATED ORAL
Qty: 3 TABLET | Refills: 1 | Status: SHIPPED | OUTPATIENT
Start: 2019-01-14 | End: 2019-06-07 | Stop reason: SDUPTHER

## 2019-01-14 RX ORDER — AMOXICILLIN AND CLAVULANATE POTASSIUM 875; 125 MG/1; MG/1
1 TABLET, FILM COATED ORAL 2 TIMES DAILY
Qty: 14 TABLET | Refills: 0 | Status: SHIPPED | OUTPATIENT
Start: 2019-01-14 | End: 2019-01-21

## 2019-01-14 NOTE — PROGRESS NOTES
Chief Complaint:  LLQ pain    HPI:  Antonieta Howe is a 58 y.o. female who presents today for LLQ pain ongoing x 3 days. She has a history of diverticulitis, 3-4 times in the past year per patient. This feels like her usual episodes. She denies nausea and vomiting and has been able to keep food down. No changes in BM. Pain worsening over the last 3 days. Denies any fevers/chills.     ROS:  Constitutional: no fevers, night sweats or unexplained weight loss  Eyes: no vision changes  ENT: no runny nose, ear pain, sore throat  Cardio: no chest pain, palpitations  Pulm: no shortness of breath, wheezing, or cough  GI: +abdominal pain -changes in bowel movements  : no difficulty urinating  MSK: no difficulty ambulating, no joint pain  Neuro: no weakness, dizziness or headache  Psych: no trouble sleeping  Endo: no change in appetite      Past Medical History:   Diagnosis Date   • Achilles tendonitis    • Cancer (CMS/HCC)    • Esophagitis    • Gastrointestinal disorder    • Hepatitis    • Hyperlipidemia    • Osteoarthritis    • Osteoporosis    • Plantar fasciitis    • Skin disorder    • Splenic infarct       Family History   Problem Relation Age of Onset   • Heart attack Mother    • Hypertension Mother    • Stroke Mother    • Stroke Father    • Heart attack Other    • Heart disease Other    • Stroke Other    • Depression Other    • Hypertension Other    • Breast cancer Neg Hx    • Ovarian cancer Neg Hx       Social History     Socioeconomic History   • Marital status:      Spouse name: Not on file   • Number of children: Not on file   • Years of education: Not on file   • Highest education level: Not on file   Social Needs   • Financial resource strain: Not on file   • Food insecurity - worry: Not on file   • Food insecurity - inability: Not on file   • Transportation needs - medical: Not on file   • Transportation needs - non-medical: Not on file   Occupational History   • Not on file   Tobacco Use   • Smoking  status: Former Smoker     Packs/day: 0.50     Years: 10.00     Pack years: 5.00     Last attempt to quit:      Years since quittin.0   • Smokeless tobacco: Never Used   Substance and Sexual Activity   • Alcohol use: No   • Drug use: No   • Sexual activity: Defer   Other Topics Concern   • Not on file   Social History Narrative   • Not on file      Allergies   Allergen Reactions   • Dexlansoprazole    • Bactrim [Sulfamethoxazole-Trimethoprim] Itching and Rash   • Other Itching and Rash     Kapidex        There is no immunization history on file for this patient.     PE:  Vitals:    19 1556   BP: 118/74   Temp: 97.8 °F (36.6 °C)   SpO2: 99%        Gen Appearance: NAD  HEENT: Normocephalic, PERRLA, no thyromegaly, trache midline  Heart: RRR, normal S1 and S2, no murmur  Lungs: CTA b/l, no wheezing, no crackles  Abdomen: Soft, LLQ pain to palpation, non-distended, no guarding and BSx4  MSK: Moves all extremities well, normal gait, no peripheral edema  Pulses: Palpable and equal b/l  Lymph nodes: No palpable lymphadenopathy   Neuro: No focal deficits      Current Outpatient Medications   Medication Sig Dispense Refill   • albuterol 108 (90 Base) MCG/ACT inhaler Inhale 2 puffs Every 4 (Four) Hours As Needed for Wheezing.     • clopidogrel (PLAVIX) 75 MG tablet Take 75 mg by mouth Daily.     • Coenzyme Q10 (COQ10) 200 MG capsule Take 200 mg by mouth Take As Directed.     • cyclobenzaprine (FLEXERIL) 10 MG tablet Take 10 mg by mouth 2 (Two) Times a Day As Needed for Muscle Spasms.     • esomeprazole (nexIUM) 40 MG capsule Take 40 mg by mouth Take As Directed.     • MULTIPLE VITAMIN PO Take 1 tablet by mouth Take As Directed.     • nebivolol (BYSTOLIC) 10 MG tablet Take 10 mg by mouth Take As Directed.     • pravastatin (PRAVACHOL) 40 MG tablet Take 40 mg by mouth Take As Directed.     • risedronate (ACTONEL) 150 MG tablet Take 1 tablet by mouth Every 30 (Thirty) Days. with water on empty stomach, nothing by  mouth or lie down for next 30 minutes. 3 tablet 1   • VITAMIN D, CHOLECALCIFEROL, PO Take 5,000 Units by mouth Take As Directed.     • amoxicillin-clavulanate (AUGMENTIN) 875-125 MG per tablet Take 1 tablet by mouth 2 (Two) Times a Day for 7 days. 14 tablet 0   • fluticasone-salmeterol (ADVAIR HFA) 45-21 MCG/ACT inhaler Inhale 2 puffs 2 (Two) Times a Day. 1 inhaler 1     No current facility-administered medications for this visit.         Antonieta was seen today for abdominal pain and nausea.    Diagnoses and all orders for this visit:    Diverticulitis  -     CT Abdomen Pelvis With & Without Contrast; Future  -     CBC & Differential; Future  -     Comprehensive Metabolic Panel; Future  -     amoxicillin-clavulanate (AUGMENTIN) 875-125 MG per tablet; Take 1 tablet by mouth 2 (Two) Times a Day for 7 days.  Blood work and CT scan to r/o diverticulitis. Start on abx ppx until we get tests back. VSS.  Osteoporosis, unspecified osteoporosis type, unspecified pathological fracture presence  -     risedronate (ACTONEL) 150 MG tablet; Take 1 tablet by mouth Every 30 (Thirty) Days. with water on empty stomach, nothing by mouth or lie down for next 30 minutes.  Refill. Will discuss DEXA and osteoporosis at f/u in 2 weeks.       Return in about 2 weeks (around 1/28/2019).

## 2019-01-15 ENCOUNTER — TELEPHONE (OUTPATIENT)
Dept: FAMILY MEDICINE CLINIC | Facility: CLINIC | Age: 59
End: 2019-01-15

## 2019-01-18 ENCOUNTER — HOSPITAL ENCOUNTER (OUTPATIENT)
Dept: CT IMAGING | Facility: HOSPITAL | Age: 59
Discharge: HOME | End: 2019-01-18
Admitting: INTERNAL MEDICINE

## 2019-01-18 DIAGNOSIS — K57.92 DIVERTICULITIS: ICD-10-CM

## 2019-01-18 PROCEDURE — 74178 CT ABD&PLV WO CNTR FLWD CNTR: CPT

## 2019-01-18 PROCEDURE — 25010000002 IOPAMIDOL 61 % SOLUTION: Performed by: INTERNAL MEDICINE

## 2019-01-18 RX ADMIN — IOPAMIDOL 95 ML: 612 INJECTION, SOLUTION INTRAVENOUS at 15:02

## 2019-01-23 ENCOUNTER — OFFICE VISIT (OUTPATIENT)
Dept: FAMILY MEDICINE CLINIC | Facility: CLINIC | Age: 59
End: 2019-01-23

## 2019-01-23 VITALS
DIASTOLIC BLOOD PRESSURE: 60 MMHG | HEIGHT: 62 IN | TEMPERATURE: 97.5 F | BODY MASS INDEX: 24.18 KG/M2 | OXYGEN SATURATION: 99 % | WEIGHT: 131.4 LBS | HEART RATE: 65 BPM | SYSTOLIC BLOOD PRESSURE: 112 MMHG

## 2019-01-23 DIAGNOSIS — M62.830 BACK MUSCLE SPASM: ICD-10-CM

## 2019-01-23 DIAGNOSIS — G89.29 CHRONIC BILATERAL THORACIC BACK PAIN: ICD-10-CM

## 2019-01-23 DIAGNOSIS — K21.9 GASTROESOPHAGEAL REFLUX DISEASE, ESOPHAGITIS PRESENCE NOT SPECIFIED: ICD-10-CM

## 2019-01-23 DIAGNOSIS — J45.909 ASTHMA, UNSPECIFIED ASTHMA SEVERITY, UNSPECIFIED WHETHER COMPLICATED, UNSPECIFIED WHETHER PERSISTENT: ICD-10-CM

## 2019-01-23 DIAGNOSIS — I34.1 MITRAL VALVE PROLAPSE: ICD-10-CM

## 2019-01-23 DIAGNOSIS — M81.0 OSTEOPOROSIS, UNSPECIFIED OSTEOPOROSIS TYPE, UNSPECIFIED PATHOLOGICAL FRACTURE PRESENCE: ICD-10-CM

## 2019-01-23 DIAGNOSIS — E78.2 MIXED HYPERLIPIDEMIA: ICD-10-CM

## 2019-01-23 DIAGNOSIS — M54.6 CHRONIC BILATERAL THORACIC BACK PAIN: ICD-10-CM

## 2019-01-23 DIAGNOSIS — K57.92 DIVERTICULITIS: Primary | ICD-10-CM

## 2019-01-23 PROBLEM — D73.5 INFARCTION OF SPLEEN: Status: ACTIVE | Noted: 2019-01-23

## 2019-01-23 PROCEDURE — 99214 OFFICE O/P EST MOD 30 MIN: CPT | Performed by: INTERNAL MEDICINE

## 2019-01-23 RX ORDER — RANITIDINE 300 MG/1
300 TABLET ORAL 2 TIMES DAILY
COMMUNITY
End: 2019-06-07 | Stop reason: SDUPTHER

## 2019-01-23 RX ORDER — METHOCARBAMOL 500 MG/1
500 TABLET, FILM COATED ORAL DAILY PRN
Qty: 30 TABLET | Refills: 0 | Status: SHIPPED | OUTPATIENT
Start: 2019-01-23

## 2019-01-23 NOTE — PROGRESS NOTES
Chief Complaint:  Back spasms, f/u diverticulitis    HPI:  Antonieta Howe is a 58 y.o. female who presents today for f/u diverticulitis and to discuss ongoing chronic medical issues. She is feeling much better in regards to abdominal pain and diverticulitis. Eating well, good appetite, no more pain and no changes in bowel movements. She is concerns with her now chronic back pain from injury 2 years prior. 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 Mostly when having to use her arms or do anything over-head. She has muscle relaxers that help, although most of the time she is able to rest/stretch and self-treat the pain before the medication kicks in. She also reports severe drowsiness with flexeril which limits how much she can take. She was evaluated by a neurosurgeon in the past but no surgery or procedure was done at that time. She completed physical therapy shortly after the initial accident but none recently.     ROS:  Constitutional: no fevers, night sweats or unexplained weight loss  Eyes: no vision changes  ENT: no runny nose, ear pain, sore throat  Cardio: no chest pain, palpitations  Pulm: no shortness of breath, wheezing, or cough  GI: no abdominal pain or changes in bowel movements  : no difficulty urinating  MSK: no difficulty ambulating, +back pain  Neuro: no weakness, dizziness or headache  Psych: no trouble sleeping  Endo: no change in appetite      Past Medical History:   Diagnosis Date   • Achilles tendonitis    • Cancer (CMS/HCC)    • Esophagitis    • Gastrointestinal disorder    • Hepatitis    • Hyperlipidemia    • Osteoarthritis    • Osteoporosis    • Plantar fasciitis    • Skin disorder    • Splenic infarct       Family History   Problem Relation Age of Onset   • Heart attack Mother    • Hypertension Mother    • Stroke Mother    • Stroke Father    • Heart attack Other    • Heart disease Other    • Stroke Other    •  Depression Other    • Hypertension Other    • Breast cancer Neg Hx    • Ovarian cancer Neg Hx       Social History     Socioeconomic History   • Marital status:      Spouse name: Not on file   • Number of children: Not on file   • Years of education: Not on file   • Highest education level: Not on file   Social Needs   • Financial resource strain: Not on file   • Food insecurity - worry: Not on file   • Food insecurity - inability: Not on file   • Transportation needs - medical: Not on file   • Transportation needs - non-medical: Not on file   Occupational History   • Not on file   Tobacco Use   • Smoking status: Former Smoker     Packs/day: 0.50     Years: 10.00     Pack years: 5.00     Last attempt to quit:      Years since quittin.0   • Smokeless tobacco: Never Used   Substance and Sexual Activity   • Alcohol use: No   • Drug use: No   • Sexual activity: Defer   Other Topics Concern   • Not on file   Social History Narrative   • Not on file      Allergies   Allergen Reactions   • Dexlansoprazole    • Bactrim [Sulfamethoxazole-Trimethoprim] Itching and Rash   • Other Itching and Rash     Kapidex        There is no immunization history on file for this patient.     PE:  Vitals:    19 1548   BP: 112/60   Pulse: 65   Temp: 97.5 °F (36.4 °C)   SpO2: 99%        Gen Appearance: NAD  HEENT: Normocephalic, PERRLA, no thyromegaly, trache midline  Heart: RRR, normal S1 and S2, no murmur  Lungs: CTA b/l, no wheezing, no crackles  Abdomen: Soft, non-tender, non-distended, no guarding and BSx4  MSK: Moves all extremities well, normal gait, paraspinal hypertrophy of right side thoracic region- tender to palpation  Pulses: Palpable and equal b/l  Lymph nodes: No palpable lymphadenopathy   Neuro: No focal deficits      Current Outpatient Medications   Medication Sig Dispense Refill   • albuterol 108 (90 Base) MCG/ACT inhaler Inhale 2 puffs Every 4 (Four) Hours As Needed for Wheezing.     • Calcium  Polycarbophil (FIBER-CAPS PO) Take  by mouth Daily.     • clopidogrel (PLAVIX) 75 MG tablet Take 75 mg by mouth Daily.     • Coenzyme Q10 (COQ10) 200 MG capsule Take 200 mg by mouth Take As Directed.     • esomeprazole (nexIUM) 40 MG capsule Take 40 mg by mouth Take As Directed.     • MULTIPLE VITAMIN PO Take 1 tablet by mouth Take As Directed.     • nebivolol (BYSTOLIC) 5 MG tablet Take 5 mg by mouth Take As Directed.     • pravastatin (PRAVACHOL) 40 MG tablet Take 40 mg by mouth Take As Directed.     • Probiotic Product (PROBIOTIC ADVANCED PO) Take  by mouth Daily.     • raNITIdine (ZANTAC) 300 MG tablet Take 300 mg by mouth 2 (Two) Times a Day.     • risedronate (ACTONEL) 150 MG tablet Take 1 tablet by mouth Every 30 (Thirty) Days. with water on empty stomach, nothing by mouth or lie down for next 30 minutes. 3 tablet 1   • VITAMIN D, CHOLECALCIFEROL, PO Take 5,000 Units by mouth Take As Directed.     • methocarbamol (ROBAXIN) 500 MG tablet Take 1 tablet by mouth Daily As Needed for Muscle Spasms. 30 tablet 0     No current facility-administered medications for this visit.         Antonieta was seen today for annual exam and diverticulitis.    Diagnoses and all orders for this visit:    Diverticulitis  Resolved. No complications on recent CT scan. C-scope 2 years prior.   Mixed hyperlipidemia  Improvement on recent lipid panel. Cont statin.   Gastroesophageal reflux disease, esophagitis presence not specified  Stable on zantac. Will discuss EGD screening in the future.   Osteoporosis, unspecified osteoporosis type, unspecified pathological fracture presence  Recent DEXA 2 years prior. Taking risendronate. Supplement vitamin D and Ca.   Asthma, unspecified asthma severity, unspecified whether complicated, unspecified whether persistent  Recent PFTs 5 years prior. Will need to get records or repeat with diffusion capacity. She only takes inhalers with illness. She will occasionally have unprovoked coughing fits. No  specific triggers have been identified.   Back muscle spasm  -     Ambulatory Referral to Physical Therapy Evaluate and treat  Trial robaxin for spasms. Refer to PT for further evaluation and will refer for second opinion by NSG.   Chronic bilateral thoracic back pain  -     methocarbamol (ROBAXIN) 500 MG tablet; Take 1 tablet by mouth Daily As Needed for Muscle Spasms.  See above. Will trial PRN robaxin and PT. Refer to NSG for 2nd opinion. Per patient, initial physician felt this was a mechanical issue and no procedure or surgery was needed.        Return in about 1 year (around 1/23/2020).

## 2019-06-06 ENCOUNTER — PATIENT MESSAGE (OUTPATIENT)
Dept: FAMILY MEDICINE CLINIC | Facility: CLINIC | Age: 59
End: 2019-06-06

## 2019-06-06 DIAGNOSIS — M81.0 OSTEOPOROSIS, UNSPECIFIED OSTEOPOROSIS TYPE, UNSPECIFIED PATHOLOGICAL FRACTURE PRESENCE: ICD-10-CM

## 2019-06-07 RX ORDER — PRAVASTATIN SODIUM 40 MG
40 TABLET ORAL TAKE AS DIRECTED
Qty: 90 TABLET | Refills: 0 | Status: SHIPPED | OUTPATIENT
Start: 2019-06-07 | End: 2022-02-10 | Stop reason: ALTCHOICE

## 2019-06-07 RX ORDER — RISEDRONATE SODIUM 150 MG/1
150 TABLET, FILM COATED ORAL
Qty: 3 TABLET | Refills: 1 | Status: SHIPPED | OUTPATIENT
Start: 2019-06-07 | End: 2020-03-08 | Stop reason: SDUPTHER

## 2019-06-07 RX ORDER — CLOPIDOGREL BISULFATE 75 MG/1
75 TABLET ORAL DAILY
Qty: 90 TABLET | Refills: 0 | Status: SHIPPED | OUTPATIENT
Start: 2019-06-07 | End: 2019-09-01 | Stop reason: SDUPTHER

## 2019-06-07 RX ORDER — RANITIDINE 300 MG/1
300 TABLET ORAL 2 TIMES DAILY
Qty: 90 TABLET | Refills: 0 | Status: SHIPPED | OUTPATIENT
Start: 2019-06-07 | End: 2022-02-10 | Stop reason: SDUPTHER

## 2019-06-07 NOTE — TELEPHONE ENCOUNTER
From: Antonieta Howe  To: Americo Flores DO  Sent: 6/6/2019 9:01 PM EDT  Subject: Prescription Question    Could I please get 90 day supply refills on the following medications:  Pravastatin 40 mg once daily   Clopidogrel 75 mg once daily  Ranitidine 300 mg twice daily  Actonel 150 mg once monthly    Thank you!

## 2019-09-03 RX ORDER — CLOPIDOGREL BISULFATE 75 MG/1
75 TABLET ORAL DAILY
Qty: 90 TABLET | Refills: 1 | Status: SHIPPED | OUTPATIENT
Start: 2019-09-03

## 2019-11-02 ENCOUNTER — HOSPITAL ENCOUNTER (OUTPATIENT)
Facility: HOSPITAL | Age: 59
Setting detail: OBSERVATION
Discharge: HOME | End: 2019-11-04
Attending: EMERGENCY MEDICINE | Admitting: INTERNAL MEDICINE

## 2019-11-02 ENCOUNTER — APPOINTMENT (OUTPATIENT)
Dept: CT IMAGING | Facility: HOSPITAL | Age: 59
End: 2019-11-02

## 2019-11-02 DIAGNOSIS — R19.7 VOMITING AND DIARRHEA: ICD-10-CM

## 2019-11-02 DIAGNOSIS — Z87.898 HISTORY OF FEVER: ICD-10-CM

## 2019-11-02 DIAGNOSIS — R10.10 PAIN OF UPPER ABDOMEN: Primary | ICD-10-CM

## 2019-11-02 DIAGNOSIS — R11.10 VOMITING AND DIARRHEA: ICD-10-CM

## 2019-11-02 DIAGNOSIS — E86.0 DEHYDRATION: ICD-10-CM

## 2019-11-02 PROBLEM — R50.9 FEVER: Status: ACTIVE | Noted: 2019-11-02

## 2019-11-02 PROBLEM — K52.9 ENTERITIS: Status: ACTIVE | Noted: 2019-11-02

## 2019-11-02 LAB
ALBUMIN SERPL-MCNC: 4.4 G/DL (ref 3.5–5.2)
ALBUMIN/GLOB SERPL: 1.5 G/DL
ALP SERPL-CCNC: 96 U/L (ref 39–117)
ALT SERPL W P-5'-P-CCNC: 20 U/L (ref 1–33)
ANION GAP SERPL CALCULATED.3IONS-SCNC: 14 MMOL/L (ref 5–15)
AST SERPL-CCNC: 20 U/L (ref 1–32)
BASOPHILS # BLD AUTO: 0.01 10*3/MM3 (ref 0–0.2)
BASOPHILS NFR BLD AUTO: 0.1 % (ref 0–1.5)
BILIRUB SERPL-MCNC: 1.1 MG/DL (ref 0.2–1.2)
BILIRUB UR QL STRIP: NEGATIVE
BUN BLD-MCNC: 17 MG/DL (ref 6–20)
BUN/CREAT SERPL: 26.6 (ref 7–25)
CALCIUM SPEC-SCNC: 9.3 MG/DL (ref 8.6–10.5)
CHLORIDE SERPL-SCNC: 102 MMOL/L (ref 98–107)
CLARITY UR: CLEAR
CO2 SERPL-SCNC: 24 MMOL/L (ref 22–29)
COLOR UR: ABNORMAL
CREAT BLD-MCNC: 0.64 MG/DL (ref 0.57–1)
D-LACTATE SERPL-SCNC: 1.2 MMOL/L (ref 0.5–2)
DEPRECATED RDW RBC AUTO: 47.5 FL (ref 37–54)
EOSINOPHIL # BLD AUTO: 0.01 10*3/MM3 (ref 0–0.4)
EOSINOPHIL NFR BLD AUTO: 0.1 % (ref 0.3–6.2)
ERYTHROCYTE [DISTWIDTH] IN BLOOD BY AUTOMATED COUNT: 14.2 % (ref 12.3–15.4)
GFR SERPL CREATININE-BSD FRML MDRD: 96 ML/MIN/1.73
GLOBULIN UR ELPH-MCNC: 2.9 GM/DL
GLUCOSE BLD-MCNC: 144 MG/DL (ref 65–99)
GLUCOSE UR STRIP-MCNC: NEGATIVE MG/DL
HCT VFR BLD AUTO: 45.7 % (ref 34–46.6)
HGB BLD-MCNC: 14.7 G/DL (ref 12–15.9)
HGB UR QL STRIP.AUTO: NEGATIVE
HOLD SPECIMEN: NORMAL
HOLD SPECIMEN: NORMAL
IMM GRANULOCYTES # BLD AUTO: 0.02 10*3/MM3 (ref 0–0.05)
IMM GRANULOCYTES NFR BLD AUTO: 0.2 % (ref 0–0.5)
KETONES UR QL STRIP: ABNORMAL
LEUKOCYTE ESTERASE UR QL STRIP.AUTO: NEGATIVE
LIPASE SERPL-CCNC: 22 U/L (ref 13–60)
LYMPHOCYTES # BLD AUTO: 0.91 10*3/MM3 (ref 0.7–3.1)
LYMPHOCYTES NFR BLD AUTO: 9.5 % (ref 19.6–45.3)
MCH RBC QN AUTO: 29.2 PG (ref 26.6–33)
MCHC RBC AUTO-ENTMCNC: 32.2 G/DL (ref 31.5–35.7)
MCV RBC AUTO: 90.7 FL (ref 79–97)
MONOCYTES # BLD AUTO: 0.59 10*3/MM3 (ref 0.1–0.9)
MONOCYTES NFR BLD AUTO: 6.2 % (ref 5–12)
NEUTROPHILS # BLD AUTO: 8 10*3/MM3 (ref 1.7–7)
NEUTROPHILS NFR BLD AUTO: 83.9 % (ref 42.7–76)
NITRITE UR QL STRIP: NEGATIVE
NRBC BLD AUTO-RTO: 0 /100 WBC (ref 0–0.2)
PH UR STRIP.AUTO: 5.5 [PH] (ref 5–8)
PLATELET # BLD AUTO: 377 10*3/MM3 (ref 140–450)
PMV BLD AUTO: 8.4 FL (ref 6–12)
POTASSIUM BLD-SCNC: 3.4 MMOL/L (ref 3.5–5.2)
PROT SERPL-MCNC: 7.3 G/DL (ref 6–8.5)
PROT UR QL STRIP: NEGATIVE
RBC # BLD AUTO: 5.04 10*6/MM3 (ref 3.77–5.28)
SODIUM BLD-SCNC: 140 MMOL/L (ref 136–145)
SP GR UR STRIP: 1.08 (ref 1–1.03)
UROBILINOGEN UR QL STRIP: ABNORMAL
WBC NRBC COR # BLD: 9.54 10*3/MM3 (ref 3.4–10.8)
WHOLE BLOOD HOLD SPECIMEN: NORMAL
WHOLE BLOOD HOLD SPECIMEN: NORMAL

## 2019-11-02 PROCEDURE — G0378 HOSPITAL OBSERVATION PER HR: HCPCS

## 2019-11-02 PROCEDURE — 83690 ASSAY OF LIPASE: CPT | Performed by: EMERGENCY MEDICINE

## 2019-11-02 PROCEDURE — 96375 TX/PRO/DX INJ NEW DRUG ADDON: CPT

## 2019-11-02 PROCEDURE — 25010000002 PIPERACILLIN SOD-TAZOBACTAM PER 1 G: Performed by: PHYSICIAN ASSISTANT

## 2019-11-02 PROCEDURE — 25010000002 IOPAMIDOL 61 % SOLUTION: Performed by: EMERGENCY MEDICINE

## 2019-11-02 PROCEDURE — 83605 ASSAY OF LACTIC ACID: CPT | Performed by: EMERGENCY MEDICINE

## 2019-11-02 PROCEDURE — 85025 COMPLETE CBC W/AUTO DIFF WBC: CPT | Performed by: EMERGENCY MEDICINE

## 2019-11-02 PROCEDURE — 96365 THER/PROPH/DIAG IV INF INIT: CPT

## 2019-11-02 PROCEDURE — 74177 CT ABD & PELVIS W/CONTRAST: CPT

## 2019-11-02 PROCEDURE — 80053 COMPREHEN METABOLIC PANEL: CPT | Performed by: EMERGENCY MEDICINE

## 2019-11-02 PROCEDURE — 87040 BLOOD CULTURE FOR BACTERIA: CPT | Performed by: PHYSICIAN ASSISTANT

## 2019-11-02 PROCEDURE — 81003 URINALYSIS AUTO W/O SCOPE: CPT | Performed by: EMERGENCY MEDICINE

## 2019-11-02 PROCEDURE — 25010000002 ONDANSETRON PER 1 MG: Performed by: PHYSICIAN ASSISTANT

## 2019-11-02 PROCEDURE — 99284 EMERGENCY DEPT VISIT MOD MDM: CPT

## 2019-11-02 RX ORDER — SODIUM CHLORIDE 0.9 % (FLUSH) 0.9 %
10 SYRINGE (ML) INJECTION AS NEEDED
Status: DISCONTINUED | OUTPATIENT
Start: 2019-11-02 | End: 2019-11-04 | Stop reason: HOSPADM

## 2019-11-02 RX ORDER — FAMOTIDINE 20 MG/1
20 TABLET, FILM COATED ORAL 2 TIMES DAILY
COMMUNITY
End: 2022-02-10 | Stop reason: SDUPTHER

## 2019-11-02 RX ORDER — ONDANSETRON 2 MG/ML
4 INJECTION INTRAMUSCULAR; INTRAVENOUS ONCE
Status: COMPLETED | OUTPATIENT
Start: 2019-11-02 | End: 2019-11-02

## 2019-11-02 RX ORDER — ACETAMINOPHEN 500 MG
1000 TABLET ORAL ONCE
Status: COMPLETED | OUTPATIENT
Start: 2019-11-02 | End: 2019-11-02

## 2019-11-02 RX ADMIN — TAZOBACTAM SODIUM AND PIPERACILLIN SODIUM 3.38 G: 375; 3 INJECTION, SOLUTION INTRAVENOUS at 23:14

## 2019-11-02 RX ADMIN — IOPAMIDOL 95 ML: 612 INJECTION, SOLUTION INTRAVENOUS at 20:45

## 2019-11-02 RX ADMIN — SODIUM CHLORIDE 1000 ML: 9 INJECTION, SOLUTION INTRAVENOUS at 21:53

## 2019-11-02 RX ADMIN — ONDANSETRON 4 MG: 2 INJECTION INTRAMUSCULAR; INTRAVENOUS at 21:53

## 2019-11-02 RX ADMIN — ACETAMINOPHEN 1000 MG: 500 TABLET ORAL at 22:01

## 2019-11-02 NOTE — ED PROVIDER NOTES
Subjective   Antonieta Howe is a 59 y.o.female who presents to the emergency department with complaints of nausea and vomiting.The patient states she began experiencing nausea and vomiting at 0100 this morning. She has had 4 episodes of vomiting described as nonbilious. She has experienced intermittent sharp periumbilical abdominal pain. She denies blood in her stool, hematuria or other changes in her urine. She spoke with her PCP Dr. Valderrama prior to arrival who suggested she come to the ED for further evaluation due to her fever or 100.7. She has a history of diverticulitis but notes her symptoms are not similar. She has had a prior total splenectomy, tubal ligation, hysterectomy,  and cholecystectomy. She denies smoking, alcohol use or drug use. There are no other acute complaints at this time.        History provided by:  Patient  Nausea   The primary symptoms include fever, abdominal pain (intermittent periumbilical), nausea, vomiting and diarrhea. Primary symptoms do not include hematochezia or dysuria. The illness began today (0100). The onset was sudden. The problem has not changed since onset.  The fever began today. The fever has been resolved since its onset. The maximum temperature recorded prior to her arrival was 100 to 100.9 F. The temperature was taken by an oral thermometer.   The vomiting began today. Vomiting occurs 2 to 5 times per day. The emesis contains stomach contents and bilious material.   The illness is also significant for chills.       Review of Systems   Constitutional: Positive for chills and fever.   Gastrointestinal: Positive for abdominal pain (intermittent periumbilical), diarrhea, nausea and vomiting. Negative for blood in stool and hematochezia.   Genitourinary: Negative for dysuria, frequency, hematuria and urgency.   All other systems reviewed and are negative.      Past Medical History:   Diagnosis Date   • Achilles tendonitis    • Esophagitis    • Gastrointestinal  disorder    • Hepatitis    • Hyperlipidemia    • Osteoarthritis    • Osteoporosis    • Plantar fasciitis    • Skin disorder    • Splenic infarct        Allergies   Allergen Reactions   • Levaquin [Levofloxacin] Other (See Comments)     Patient states she gets really bad plantar fasciitis   • Bactrim [Sulfamethoxazole-Trimethoprim] Itching and Rash   • Beeswax Rash   • Other Itching and Rash     Kapidex       Past Surgical History:   Procedure Laterality Date   •  SECTION     • CHOLECYSTECTOMY     • HYSTERECTOMY     • LAPAROSCOPIC TUBAL LIGATION     • LAPAROSCOPY DIAGNOSTIC / BIOPSY / ASPIRATION / LYSIS     • SPLENECTOMY         Family History   Problem Relation Age of Onset   • Heart attack Mother    • Hypertension Mother    • Stroke Mother    • Stroke Father    • Heart attack Other    • Heart disease Other    • Stroke Other    • Depression Other    • Hypertension Other    • Breast cancer Neg Hx    • Ovarian cancer Neg Hx        Social History     Socioeconomic History   • Marital status:      Spouse name: Not on file   • Number of children: Not on file   • Years of education: Not on file   • Highest education level: Not on file   Tobacco Use   • Smoking status: Former Smoker     Packs/day: 0.50     Years: 10.00     Pack years: 5.00     Last attempt to quit:      Years since quittin.8   • Smokeless tobacco: Never Used   Substance and Sexual Activity   • Alcohol use: No   • Drug use: No   • Sexual activity: Defer   Social History Narrative    , works as a nurse at  [email or web address]         Objective   Physical Exam   Constitutional: She is oriented to person, place, and time. She appears well-developed and well-nourished.   HENT:   Head: Normocephalic and atraumatic.   Nose: Nose normal.   Eyes: Conjunctivae are normal. No scleral icterus.   Neck: Normal range of motion. Neck supple.   Cardiovascular: Normal rate, regular rhythm and normal heart sounds. Exam reveals no gallop and no friction  rub.   No murmur heard.  Pulmonary/Chest: Effort normal and breath sounds normal. No stridor. No respiratory distress. She has no wheezes. She has no rales.   Abdominal: Soft. There is tenderness (mild) in the right upper quadrant and left upper quadrant. There is CVA tenderness (left). There is no rebound and no guarding.   Musculoskeletal: Normal range of motion.   Neurological: She is alert and oriented to person, place, and time. No cranial nerve deficit or sensory deficit. She exhibits normal muscle tone.   Skin: Skin is warm and dry.   Psychiatric: She has a normal mood and affect. Her behavior is normal.   Nursing note and vitals reviewed.      Procedures         ED Course  ED Course as of Nov 03 0347   Sat Nov 02, 2019 2007 Patient presents complaining of fever, nausea vomiting diarrhea as well as abdominal pain.  She has a history of splenic infarct requiring splenectomy.  She is tender in the upper abdomen as well as the left CVA.  Differential diagnosis includes pyelonephritis, gastroenteritis, diverticulitis.  Her PCP called and wanted the patient admitted given her history of fever today and that she is asplenic.  Plan to obtain CBC, CMP, urinalysis, lactate, CT abdomen pelvis.  Plan to administer IV fluids and Zofran.  [WT]   2022 WBC: 9.54 [WT]   2039 WBC: 9.54 [WT]   2039 Lactate: 1.2 [WT]   2039 Lipase: 22 [WT]   2040 Potassium: (!) 3.4 [WT]   2040 Glucose: (!) 144 [WT]   2119 Ctap  Impression    Mild increase in small bowel gas and fluid could represent an early enteritis. No focal inflammatory change or obstruction.    Status post splenectomy with sizable splenule left upper quadrant.      [WT]   2217 Leukocytes, UA: Negative [WT]   2217 Ketones, UA: (!) 15 mg/dL (1+) [WT]   2242 Paged the hospitalist at this time.  [TK]   2251 Case discussed in detail with Dr. Kim, Hospitalist, who agrees with plan for care and accepts the patient.    [TK]   2305 Vicky Crisostomo is at the bedside  re-evaluating the patient and updating them on current findings. She has a normal neurologic exam at this time.    [TK]   Sun Nov 03, 2019   0346 CT abdomen and pelvis reveals enteritis.  Patient does have ketonuria.  Her labs are within normal limits with the exception of mild elevation in glucose.  Given her history of splenectomy plan to admit pending blood cultures, will treat with Zosyn.  Patient reports that she is improved.  She had complained of some headache in the emergency department, denies worse headache of her life or sudden onset, normal neurologic exam.   [WT]      ED Course User Index  [TK] Olman Sinclair  [WT] Vicky Crisostomo, SILVESTRE       Recent Results (from the past 24 hour(s))   Comprehensive Metabolic Panel    Collection Time: 11/02/19  7:56 PM   Result Value Ref Range    Glucose 144 (H) 65 - 99 mg/dL    BUN 17 6 - 20 mg/dL    Creatinine 0.64 0.57 - 1.00 mg/dL    Sodium 140 136 - 145 mmol/L    Potassium 3.4 (L) 3.5 - 5.2 mmol/L    Chloride 102 98 - 107 mmol/L    CO2 24.0 22.0 - 29.0 mmol/L    Calcium 9.3 8.6 - 10.5 mg/dL    Total Protein 7.3 6.0 - 8.5 g/dL    Albumin 4.40 3.50 - 5.20 g/dL    ALT (SGPT) 20 1 - 33 U/L    AST (SGOT) 20 1 - 32 U/L    Alkaline Phosphatase 96 39 - 117 U/L    Total Bilirubin 1.1 0.2 - 1.2 mg/dL    eGFR Non African Amer 96 >60 mL/min/1.73    Globulin 2.9 gm/dL    A/G Ratio 1.5 g/dL    BUN/Creatinine Ratio 26.6 (H) 7.0 - 25.0    Anion Gap 14.0 5.0 - 15.0 mmol/L   Lipase    Collection Time: 11/02/19  7:56 PM   Result Value Ref Range    Lipase 22 13 - 60 U/L   Light Blue Top    Collection Time: 11/02/19  7:56 PM   Result Value Ref Range    Extra Tube hold for add-on    Green Top (Gel)    Collection Time: 11/02/19  7:56 PM   Result Value Ref Range    Extra Tube Hold for add-ons.    Lavender Top    Collection Time: 11/02/19  7:56 PM   Result Value Ref Range    Extra Tube hold for add-on    Gold Top - SST    Collection Time: 11/02/19  7:56 PM   Result Value Ref Range     Extra Tube Hold for add-ons.    CBC Auto Differential    Collection Time: 11/02/19  7:56 PM   Result Value Ref Range    WBC 9.54 3.40 - 10.80 10*3/mm3    RBC 5.04 3.77 - 5.28 10*6/mm3    Hemoglobin 14.7 12.0 - 15.9 g/dL    Hematocrit 45.7 34.0 - 46.6 %    MCV 90.7 79.0 - 97.0 fL    MCH 29.2 26.6 - 33.0 pg    MCHC 32.2 31.5 - 35.7 g/dL    RDW 14.2 12.3 - 15.4 %    RDW-SD 47.5 37.0 - 54.0 fl    MPV 8.4 6.0 - 12.0 fL    Platelets 377 140 - 450 10*3/mm3    Neutrophil % 83.9 (H) 42.7 - 76.0 %    Lymphocyte % 9.5 (L) 19.6 - 45.3 %    Monocyte % 6.2 5.0 - 12.0 %    Eosinophil % 0.1 (L) 0.3 - 6.2 %    Basophil % 0.1 0.0 - 1.5 %    Immature Grans % 0.2 0.0 - 0.5 %    Neutrophils, Absolute 8.00 (H) 1.70 - 7.00 10*3/mm3    Lymphocytes, Absolute 0.91 0.70 - 3.10 10*3/mm3    Monocytes, Absolute 0.59 0.10 - 0.90 10*3/mm3    Eosinophils, Absolute 0.01 0.00 - 0.40 10*3/mm3    Basophils, Absolute 0.01 0.00 - 0.20 10*3/mm3    Immature Grans, Absolute 0.02 0.00 - 0.05 10*3/mm3    nRBC 0.0 0.0 - 0.2 /100 WBC   Lactic Acid, Plasma    Collection Time: 11/02/19  7:56 PM   Result Value Ref Range    Lactate 1.2 0.5 - 2.0 mmol/L   Urinalysis With Microscopic If Indicated (No Culture) - Urine, Clean Catch    Collection Time: 11/02/19 10:03 PM   Result Value Ref Range    Color, UA Orange (A) Yellow, Straw    Appearance, UA Clear Clear    pH, UA 5.5 5.0 - 8.0    Specific Gravity, UA 1.084 (H) 1.001 - 1.030    Glucose, UA Negative Negative    Ketones, UA 15 mg/dL (1+) (A) Negative    Bilirubin, UA Negative Negative    Blood, UA Negative Negative    Protein, UA Negative Negative    Leuk Esterase, UA Negative Negative    Nitrite, UA Negative Negative    Urobilinogen, UA 0.2 E.U./dL 0.2 - 1.0 E.U./dL     Note: In addition to lab results from this visit, the labs listed above may include labs taken at another facility or during a different encounter within the last 24 hours. Please correlate lab times with ED admission and discharge times for  further clarification of the services performed during this visit.    CT Abdomen Pelvis With Contrast   Final Result   Mild increase in small bowel gas and fluid could represent an early enteritis. No focal inflammatory change or obstruction.      Status post splenectomy with sizable splenule left upper quadrant.               Signer Name: COREY Paredes MD    Signed: 11/2/2019 9:01 PM    Workstation Name: [id number]     [facility]        Vitals:    11/02/19 2300 11/02/19 2330 11/02/19 2350 11/03/19 0013   BP: 103/65 90/54  120/58   BP Location:    Left arm   Patient Position:    Lying   Pulse: 82 91  89   Resp:       Temp:   98.9 °F (37.2 °C) 99.3 °F (37.4 °C)   TempSrc:   Oral Oral   SpO2: 93% 94%  97%   Weight:    61.6 kg (135 lb 11.2 oz)   Height:         Medications   sodium chloride 0.9 % flush 10 mL (not administered)   clopidogrel (PLAVIX) tablet 75 mg (75 mg Oral Given 11/3/19 0135)   sodium chloride 0.9 % flush 10 mL (10 mL Intravenous Given 11/3/19 0136)   sodium chloride 0.9 % flush 10 mL (not administered)   heparin (porcine) 5000 UNIT/ML injection 5,000 Units (not administered)   acetaminophen (TYLENOL) tablet 650 mg (650 mg Oral Given 11/3/19 0346)     Or   acetaminophen (TYLENOL) 160 MG/5ML solution 650 mg ( Oral Not Given:  See Alt 11/3/19 0346)     Or   acetaminophen (TYLENOL) suppository 650 mg ( Rectal Not Given:  See Alt 11/3/19 0346)   promethazine (PHENERGAN) tablet 12.5 mg (not administered)     Or   promethazine (PHENERGAN) injection 12.5 mg (not administered)     Or   promethazine (PHENERGAN) injection 12.5 mg (not administered)     Or   promethazine (PHENERGAN) suppository 12.5 mg (not administered)   Pharmacy to dose vancomycin (not administered)   piperacillin-tazobactam (ZOSYN) 3.375 g in iso-osmotic dextrose 50 ml (premix) (not administered)   dextrose 5 % and sodium chloride 0.45 % with KCl 20 mEq/L infusion (100 mL/hr Intravenous Currently Infusing  11/3/19 0318)   sodium chloride 0.9 % bolus 1,000 mL (0 mL Intravenous Stopped 11/2/19 2309)   ondansetron (ZOFRAN) injection 4 mg (4 mg Intravenous Given 11/2/19 2153)   iopamidol (ISOVUE-300) 61 % injection 100 mL (95 mL Intravenous Given 11/2/19 2045)   acetaminophen (TYLENOL) tablet 1,000 mg (1,000 mg Oral Given 11/2/19 2201)   piperacillin-tazobactam (ZOSYN) 3.375 g in iso-osmotic dextrose 50 ml (premix) (0 g Intravenous Stopped 11/2/19 2350)   vancomycin 1250 mg/250 mL 0.9% NS IVPB ([facility]) (1,250 mg Intravenous New Bag 11/3/19 0136)     ECG/EMG Results (last 24 hours)     ** No results found for the last 24 hours. **        No orders to display                     MDM  Number of Diagnoses or Management Options  Dehydration:   History of fever:   Pain of upper abdomen:   Vomiting and diarrhea:      Amount and/or Complexity of Data Reviewed  Clinical lab tests: reviewed        Final diagnoses:   Pain of upper abdomen   History of fever   Vomiting and diarrhea   Dehydration       Documentation assistance provided by olga Sinclair.  Information recorded by the olga was done at my direction and has been verified and validated by me.     Olman Sinclair  11/02/19 2010       Vicky Crisostomo, SILVESTRE  11/03/19 5443

## 2019-11-03 PROBLEM — Z90.81 ACQUIRED ASPLENIA: Status: ACTIVE | Noted: 2019-11-03

## 2019-11-03 PROBLEM — A41.9 SEPSIS, UNSPECIFIED ORGANISM (HCC): Status: ACTIVE | Noted: 2019-11-03

## 2019-11-03 LAB
ADV 40+41 DNA STL QL NAA+NON-PROBE: NOT DETECTED
ALBUMIN SERPL-MCNC: 3.9 G/DL (ref 3.5–5.2)
ALBUMIN/GLOB SERPL: 1.4 G/DL
ALP SERPL-CCNC: 80 U/L (ref 39–117)
ALT SERPL W P-5'-P-CCNC: 17 U/L (ref 1–33)
ANION GAP SERPL CALCULATED.3IONS-SCNC: 11 MMOL/L (ref 5–15)
AST SERPL-CCNC: 17 U/L (ref 1–32)
ASTRO TYP 1-8 RNA STL QL NAA+NON-PROBE: NOT DETECTED
BASOPHILS # BLD AUTO: 0.02 10*3/MM3 (ref 0–0.2)
BASOPHILS NFR BLD AUTO: 0.3 % (ref 0–1.5)
BILIRUB SERPL-MCNC: 1.2 MG/DL (ref 0.2–1.2)
BUN BLD-MCNC: 9 MG/DL (ref 6–20)
BUN/CREAT SERPL: 13.6 (ref 7–25)
C CAYETANENSIS DNA STL QL NAA+NON-PROBE: NOT DETECTED
CALCIUM SPEC-SCNC: 8.4 MG/DL (ref 8.6–10.5)
CAMPY SP DNA.DIARRHEA STL QL NAA+PROBE: NOT DETECTED
CHLORIDE SERPL-SCNC: 102 MMOL/L (ref 98–107)
CO2 SERPL-SCNC: 28 MMOL/L (ref 22–29)
CREAT BLD-MCNC: 0.66 MG/DL (ref 0.57–1)
CRYPTOSP STL CULT: NOT DETECTED
D-LACTATE SERPL-SCNC: 2 MMOL/L (ref 0.5–2)
DEPRECATED RDW RBC AUTO: 50.7 FL (ref 37–54)
E COLI DNA SPEC QL NAA+PROBE: NOT DETECTED
E HISTOLYT AG STL-ACNC: NOT DETECTED
EAEC PAA PLAS AGGR+AATA ST NAA+NON-PRB: NOT DETECTED
EC STX1 + STX2 GENES STL NAA+PROBE: NOT DETECTED
EOSINOPHIL # BLD AUTO: 0.08 10*3/MM3 (ref 0–0.4)
EOSINOPHIL NFR BLD AUTO: 1.3 % (ref 0.3–6.2)
EPEC EAE GENE STL QL NAA+NON-PROBE: NOT DETECTED
ERYTHROCYTE [DISTWIDTH] IN BLOOD BY AUTOMATED COUNT: 14.6 % (ref 12.3–15.4)
ETEC LTA+ST1A+ST1B TOX ST NAA+NON-PROBE: NOT DETECTED
G LAMBLIA DNA SPEC QL NAA+PROBE: NOT DETECTED
GFR SERPL CREATININE-BSD FRML MDRD: 92 ML/MIN/1.73
GLOBULIN UR ELPH-MCNC: 2.8 GM/DL
GLUCOSE BLD-MCNC: 105 MG/DL (ref 65–99)
HCT VFR BLD AUTO: 42.3 % (ref 34–46.6)
HGB BLD-MCNC: 13.2 G/DL (ref 12–15.9)
IMM GRANULOCYTES # BLD AUTO: 0.02 10*3/MM3 (ref 0–0.05)
IMM GRANULOCYTES NFR BLD AUTO: 0.3 % (ref 0–0.5)
LYMPHOCYTES # BLD AUTO: 1.82 10*3/MM3 (ref 0.7–3.1)
LYMPHOCYTES NFR BLD AUTO: 30.2 % (ref 19.6–45.3)
MAGNESIUM SERPL-MCNC: 2.4 MG/DL (ref 1.6–2.6)
MCH RBC QN AUTO: 29.6 PG (ref 26.6–33)
MCHC RBC AUTO-ENTMCNC: 31.2 G/DL (ref 31.5–35.7)
MCV RBC AUTO: 94.8 FL (ref 79–97)
MONOCYTES # BLD AUTO: 0.84 10*3/MM3 (ref 0.1–0.9)
MONOCYTES NFR BLD AUTO: 14 % (ref 5–12)
NEUTROPHILS # BLD AUTO: 3.24 10*3/MM3 (ref 1.7–7)
NEUTROPHILS NFR BLD AUTO: 53.9 % (ref 42.7–76)
NOROVIRUS GI+II RNA STL QL NAA+NON-PROBE: DETECTED
NRBC BLD AUTO-RTO: 0 /100 WBC (ref 0–0.2)
P SHIGELLOIDES DNA STL QL NAA+PROBE: NOT DETECTED
PLATELET # BLD AUTO: 364 10*3/MM3 (ref 140–450)
PMV BLD AUTO: 8.9 FL (ref 6–12)
POTASSIUM BLD-SCNC: 3.2 MMOL/L (ref 3.5–5.2)
PROT SERPL-MCNC: 6.7 G/DL (ref 6–8.5)
RBC # BLD AUTO: 4.46 10*6/MM3 (ref 3.77–5.28)
RV RNA STL NAA+PROBE: NOT DETECTED
SALMONELLA DNA SPEC QL NAA+PROBE: NOT DETECTED
SAPO I+II+IV+V RNA STL QL NAA+NON-PROBE: NOT DETECTED
SHIGELLA SP+EIEC IPAH STL QL NAA+PROBE: NOT DETECTED
SODIUM BLD-SCNC: 141 MMOL/L (ref 136–145)
V CHOLERAE DNA SPEC QL NAA+PROBE: NOT DETECTED
VIBRIO DNA SPEC NAA+PROBE: NOT DETECTED
WBC NRBC COR # BLD: 6.02 10*3/MM3 (ref 3.4–10.8)
YERSINIA STL CULT: NOT DETECTED

## 2019-11-03 PROCEDURE — 96367 TX/PROPH/DG ADDL SEQ IV INF: CPT

## 2019-11-03 PROCEDURE — G0378 HOSPITAL OBSERVATION PER HR: HCPCS

## 2019-11-03 PROCEDURE — 25010000002 PIPERACILLIN SOD-TAZOBACTAM PER 1 G: Performed by: PHYSICIAN ASSISTANT

## 2019-11-03 PROCEDURE — 25010000002 VANCOMYCIN PER 500 MG

## 2019-11-03 PROCEDURE — 83735 ASSAY OF MAGNESIUM: CPT | Performed by: INTERNAL MEDICINE

## 2019-11-03 PROCEDURE — 0097U HC BIOFIRE FILMARRAY GI PANEL: CPT | Performed by: PHYSICIAN ASSISTANT

## 2019-11-03 PROCEDURE — 63710000001 DIPHENHYDRAMINE PER 50 MG: Performed by: NURSE PRACTITIONER

## 2019-11-03 PROCEDURE — 83605 ASSAY OF LACTIC ACID: CPT | Performed by: PHYSICIAN ASSISTANT

## 2019-11-03 PROCEDURE — 85025 COMPLETE CBC W/AUTO DIFF WBC: CPT | Performed by: PHYSICIAN ASSISTANT

## 2019-11-03 PROCEDURE — 99219 PR INITIAL OBSERVATION CARE/DAY 50 MINUTES: CPT | Performed by: INTERNAL MEDICINE

## 2019-11-03 PROCEDURE — 80053 COMPREHEN METABOLIC PANEL: CPT | Performed by: PHYSICIAN ASSISTANT

## 2019-11-03 PROCEDURE — 96361 HYDRATE IV INFUSION ADD-ON: CPT

## 2019-11-03 PROCEDURE — 96366 THER/PROPH/DIAG IV INF ADDON: CPT

## 2019-11-03 PROCEDURE — 25010000002 VANCOMYCIN 10 G RECONSTITUTED SOLUTION

## 2019-11-03 RX ORDER — ACETAMINOPHEN 650 MG/1
650 SUPPOSITORY RECTAL EVERY 4 HOURS PRN
Status: DISCONTINUED | OUTPATIENT
Start: 2019-11-03 | End: 2019-11-04 | Stop reason: HOSPADM

## 2019-11-03 RX ORDER — DIPHENHYDRAMINE HCL 25 MG
25 CAPSULE ORAL EVERY 6 HOURS PRN
Status: DISCONTINUED | OUTPATIENT
Start: 2019-11-03 | End: 2019-11-04 | Stop reason: HOSPADM

## 2019-11-03 RX ORDER — ACETAMINOPHEN 160 MG/5ML
650 SOLUTION ORAL EVERY 4 HOURS PRN
Status: DISCONTINUED | OUTPATIENT
Start: 2019-11-03 | End: 2019-11-04 | Stop reason: HOSPADM

## 2019-11-03 RX ORDER — SODIUM CHLORIDE 0.9 % (FLUSH) 0.9 %
10 SYRINGE (ML) INJECTION EVERY 12 HOURS SCHEDULED
Status: DISCONTINUED | OUTPATIENT
Start: 2019-11-03 | End: 2019-11-04 | Stop reason: HOSPADM

## 2019-11-03 RX ORDER — MAGNESIUM SULFATE HEPTAHYDRATE 40 MG/ML
2 INJECTION, SOLUTION INTRAVENOUS AS NEEDED
Status: DISCONTINUED | OUTPATIENT
Start: 2019-11-03 | End: 2019-11-04 | Stop reason: HOSPADM

## 2019-11-03 RX ORDER — VANCOMYCIN HYDROCHLORIDE 1 G/200ML
15 INJECTION, SOLUTION INTRAVENOUS EVERY 12 HOURS
Status: DISCONTINUED | OUTPATIENT
Start: 2019-11-03 | End: 2019-11-03

## 2019-11-03 RX ORDER — PROMETHAZINE HYDROCHLORIDE 25 MG/ML
12.5 INJECTION, SOLUTION INTRAMUSCULAR; INTRAVENOUS EVERY 6 HOURS PRN
Status: DISCONTINUED | OUTPATIENT
Start: 2019-11-03 | End: 2019-11-04 | Stop reason: HOSPADM

## 2019-11-03 RX ORDER — FAMOTIDINE 20 MG/1
20 TABLET, FILM COATED ORAL
Status: DISCONTINUED | OUTPATIENT
Start: 2019-11-03 | End: 2019-11-04 | Stop reason: HOSPADM

## 2019-11-03 RX ORDER — POTASSIUM CHLORIDE 1.5 G/1.77G
40 POWDER, FOR SOLUTION ORAL AS NEEDED
Status: DISCONTINUED | OUTPATIENT
Start: 2019-11-03 | End: 2019-11-04 | Stop reason: HOSPADM

## 2019-11-03 RX ORDER — POTASSIUM CHLORIDE 750 MG/1
40 CAPSULE, EXTENDED RELEASE ORAL AS NEEDED
Status: DISCONTINUED | OUTPATIENT
Start: 2019-11-03 | End: 2019-11-04 | Stop reason: HOSPADM

## 2019-11-03 RX ORDER — CLOPIDOGREL BISULFATE 75 MG/1
75 TABLET ORAL DAILY
Status: DISCONTINUED | OUTPATIENT
Start: 2019-11-03 | End: 2019-11-04 | Stop reason: HOSPADM

## 2019-11-03 RX ORDER — POTASSIUM CHLORIDE 7.45 MG/ML
10 INJECTION INTRAVENOUS
Status: DISCONTINUED | OUTPATIENT
Start: 2019-11-03 | End: 2019-11-04 | Stop reason: HOSPADM

## 2019-11-03 RX ORDER — MAGNESIUM SULFATE HEPTAHYDRATE 40 MG/ML
4 INJECTION, SOLUTION INTRAVENOUS AS NEEDED
Status: DISCONTINUED | OUTPATIENT
Start: 2019-11-03 | End: 2019-11-04 | Stop reason: HOSPADM

## 2019-11-03 RX ORDER — ACETAMINOPHEN 325 MG/1
650 TABLET ORAL EVERY 4 HOURS PRN
Status: DISCONTINUED | OUTPATIENT
Start: 2019-11-03 | End: 2019-11-04 | Stop reason: HOSPADM

## 2019-11-03 RX ORDER — SODIUM CHLORIDE 0.9 % (FLUSH) 0.9 %
10 SYRINGE (ML) INJECTION AS NEEDED
Status: DISCONTINUED | OUTPATIENT
Start: 2019-11-03 | End: 2019-11-04 | Stop reason: HOSPADM

## 2019-11-03 RX ORDER — DEXTROSE, SODIUM CHLORIDE, AND POTASSIUM CHLORIDE 5; .45; .15 G/100ML; G/100ML; G/100ML
100 INJECTION INTRAVENOUS CONTINUOUS
Status: DISCONTINUED | OUTPATIENT
Start: 2019-11-03 | End: 2019-11-04 | Stop reason: HOSPADM

## 2019-11-03 RX ORDER — HEPARIN SODIUM 5000 [USP'U]/ML
5000 INJECTION, SOLUTION INTRAVENOUS; SUBCUTANEOUS EVERY 8 HOURS SCHEDULED
Status: DISCONTINUED | OUTPATIENT
Start: 2019-11-03 | End: 2019-11-04 | Stop reason: HOSPADM

## 2019-11-03 RX ORDER — PROMETHAZINE HYDROCHLORIDE 12.5 MG/1
12.5 TABLET ORAL EVERY 6 HOURS PRN
Status: DISCONTINUED | OUTPATIENT
Start: 2019-11-03 | End: 2019-11-04 | Stop reason: HOSPADM

## 2019-11-03 RX ORDER — PROMETHAZINE HYDROCHLORIDE 12.5 MG/1
12.5 SUPPOSITORY RECTAL EVERY 6 HOURS PRN
Status: DISCONTINUED | OUTPATIENT
Start: 2019-11-03 | End: 2019-11-04 | Stop reason: HOSPADM

## 2019-11-03 RX ADMIN — CLOPIDOGREL BISULFATE 75 MG: 75 TABLET ORAL at 01:35

## 2019-11-03 RX ADMIN — VANCOMYCIN HYDROCHLORIDE 1250 MG: 10 INJECTION, POWDER, LYOPHILIZED, FOR SOLUTION INTRAVENOUS at 01:36

## 2019-11-03 RX ADMIN — TAZOBACTAM SODIUM AND PIPERACILLIN SODIUM 3.38 G: 375; 3 INJECTION, SOLUTION INTRAVENOUS at 21:07

## 2019-11-03 RX ADMIN — FAMOTIDINE 20 MG: 20 TABLET, FILM COATED ORAL at 21:37

## 2019-11-03 RX ADMIN — SODIUM CHLORIDE, PRESERVATIVE FREE 10 ML: 5 INJECTION INTRAVENOUS at 21:07

## 2019-11-03 RX ADMIN — POTASSIUM CHLORIDE 40 MEQ: 750 CAPSULE, EXTENDED RELEASE ORAL at 21:37

## 2019-11-03 RX ADMIN — SODIUM CHLORIDE, PRESERVATIVE FREE 10 ML: 5 INJECTION INTRAVENOUS at 01:36

## 2019-11-03 RX ADMIN — ACETAMINOPHEN 650 MG: 325 TABLET ORAL at 11:04

## 2019-11-03 RX ADMIN — POTASSIUM CHLORIDE, DEXTROSE MONOHYDRATE AND SODIUM CHLORIDE 100 ML/HR: 150; 5; 450 INJECTION, SOLUTION INTRAVENOUS at 00:47

## 2019-11-03 RX ADMIN — POTASSIUM CHLORIDE 40 MEQ: 750 CAPSULE, EXTENDED RELEASE ORAL at 17:43

## 2019-11-03 RX ADMIN — VANCOMYCIN HYDROCHLORIDE 1000 MG: 1 INJECTION, SOLUTION INTRAVENOUS at 11:25

## 2019-11-03 RX ADMIN — ACETAMINOPHEN 650 MG: 325 TABLET ORAL at 03:46

## 2019-11-03 RX ADMIN — TAZOBACTAM SODIUM AND PIPERACILLIN SODIUM 3.38 G: 375; 3 INJECTION, SOLUTION INTRAVENOUS at 13:22

## 2019-11-03 RX ADMIN — TAZOBACTAM SODIUM AND PIPERACILLIN SODIUM 3.38 G: 375; 3 INJECTION, SOLUTION INTRAVENOUS at 06:23

## 2019-11-03 RX ADMIN — DIPHENHYDRAMINE HYDROCHLORIDE 25 MG: 25 CAPSULE ORAL at 22:28

## 2019-11-03 NOTE — PLAN OF CARE
Problem: Patient Care Overview  Goal: Plan of Care Review  Outcome: Ongoing (interventions implemented as appropriate)   11/03/19 1455   Coping/Psychosocial   Plan of Care Reviewed With patient   Plan of Care Review   Progress improving   OTHER   Outcome Summary VSS. No c/o N/V. No c/o pain. Continuing to monitor.        Problem: Pain, Chronic (Adult)  Goal: Identify Related Risk Factors and Signs and Symptoms  Outcome: Ongoing (interventions implemented as appropriate)    Goal: Acceptable Pain/Comfort Level and Functional Ability  Outcome: Ongoing (interventions implemented as appropriate)   11/03/19 1455   Pain, Chronic (Adult)   Acceptable Pain/Comfort Level and Functional Ability making progress toward outcome

## 2019-11-03 NOTE — PLAN OF CARE
Problem: Patient Care Overview  Goal: Plan of Care Review  Outcome: Ongoing (interventions implemented as appropriate)   11/03/19 0594   Coping/Psychosocial   Plan of Care Reviewed With patient   Plan of Care Review   Progress improving   OTHER   Outcome Summary Abdominal pain well controlled. Only complaints of headache.

## 2019-11-03 NOTE — PROGRESS NOTES
Pharmacy Consult-Vancomycin Dosing  Antonieta Howe is a  59 y.o. female receiving vancomycin therapy.     Indication: sepsis  Consulting Provider: hospitalist  ID Consult:     Goal Trough:15-20    Current Antimicrobial Therapy  Anti-Infectives (From admission, onward)      Ordered     Dose/Rate Route Frequency Start Stop    11/03/19 0446  vancomycin (VANCOCIN) in iso-osmotic dextrose IVPB 1 g (premix) 200 mL     Ordering Provider:  Antoni Melendez RPH    15 mg/kg × 61.6 kg  over 60 Minutes Intravenous Every 12 Hours 11/03/19 1200 11/10/19 1159    11/03/19 0012  piperacillin-tazobactam (ZOSYN) 3.375 g in iso-osmotic dextrose 50 ml (premix)     Comments:  Pharmacy to dose   Ordering Provider:  Alexandria Poole PA    3.375 g  over 30 Minutes Intravenous Every 8 Hours 11/03/19 0600 11/10/19 0559    11/03/19 0016  vancomycin 1250 mg/250 mL 0.9% NS IVPB ([facility])     Ordering Provider:  Antoni Melendez RPH    20 mg/kg × 61.6 kg  over 90 Minutes Intravenous Once 11/03/19 0115 11/03/19 0306    11/03/19 0012  Pharmacy to dose vancomycin     Ordering Provider:  Alexandria Poole PA     Does not apply Continuous PRN 11/03/19 0011 11/10/19 0010    11/02/19 2240  piperacillin-tazobactam (ZOSYN) 3.375 g in iso-osmotic dextrose 50 ml (premix)     Ordering Provider:  Vicky Crisostomo PA-C    3.375 g  over 30 Minutes Intravenous Once 11/02/19 2242 11/02/19 2350            Allergies  Allergies as of 11/02/2019 - Reviewed 11/02/2019   Allergen Reaction Noted   • Levaquin [levofloxacin] Other (See Comments) 11/02/2019   • Bactrim [sulfamethoxazole-trimethoprim] Itching and Rash 10/27/2017   • Dexlansoprazole  11/06/2017   • Other Itching and Rash 10/27/2017       Labs    Results from last 7 days   Lab Units 11/02/19  1956   BUN mg/dL 17   CREATININE mg/dL 0.64       Results from last 7 days   Lab Units 11/02/19 1956   WBC 10*3/mm3 9.54       Evaluation of Dosing     Last Dose Received in the ED/Outside Facility: 1250mg  Is Patient  "on Dialysis or Renal Replacement:     Ht - 154.9 cm (61\")  Wt - 61.6 kg (135 lb 11.2 oz)    Estimated Creatinine Clearance: 81.6 mL/min (by C-G formula based on SCr of 0.64 mg/dL).    Intake & Output (last 3 days)         10/31 0701 - 11/01 0700 11/01 0701 - 11/02 0700 11/02 0701 - 11/03 0700    IV Piggyback   1250    Total Intake(mL/kg)   1250 (20.3)    Net   +1250                   Microbiology and Radiology  Microbiology Results (last 10 days)       ** No results found for the last 240 hours. **            Evaluation of Level                         Assessment/Plan:    The patient got a dose of 1250mg in the ED. Will initiate maintenance dose at 1000 mg IV every 12 hours.  Plan for trough as patient approaches steady state, prior to the 4th dose.  Due to infection severity, will target a trough of 15-20.    Pharmacy will continue to follow the patient’s culture results and clinical progress daily.    Antoni Melendez, PharmD      "

## 2019-11-03 NOTE — PROGRESS NOTES
[facility] Medicine Services  PROGRESS NOTE    Patient Name: Antonieta Howe  : 1960  MRN: [id number]    Date of Admission: 2019  Primary Care Physician: Tavo Valderrama MD    Subjective   Subjective     CC:  N/v and fever    HPI:  Feeling better, tolerating clears, would like to try something more solid to eat. Some loose stool. No more fever.    Review of Systems  Gen- No fevers, chills  CV- No chest pain, palpitations  Resp- No cough, dyspnea  GI- some loose stool, denies abdominal pain        Objective   Objective     Vital Signs:   Temp:  [98.6 °F (37 °C)-99.6 °F (37.6 °C)] 99 °F (37.2 °C)  Heart Rate:  [] 73  Resp:  [16-18] 16  BP: ()/(53-80) 113/58        Physical Exam:  Constitutional -no acute distress, non toxic, in bed  HEENT-NCAT, mucous membranes moist  CV-RRR, S1 S2 normal, no m/r/g  Resp-CTAB, no wheezes, rhonchi or rales  Abd-soft, non-tender, non-distended, normo active bowel sounds  Ext-No lower extremity cyanosis, clubbing or edema bilaterally  Neuro-alert and oriented, speech clear, moves all extremities   Psych-normal affect   Skin- No rash on exposed UE or LE bilaterally      Results Reviewed:    Results from last 7 days   Lab Units 19  0857 19   WBC 10*3/mm3 6.02 9.54   HEMOGLOBIN g/dL 13.2 14.7   HEMATOCRIT % 42.3 45.7   PLATELETS 10*3/mm3 364 377     Results from last 7 days   Lab Units 19  0857 19   SODIUM mmol/L 141 140   POTASSIUM mmol/L 3.2* 3.4*   CHLORIDE mmol/L 102 102   CO2 mmol/L 28.0 24.0   BUN mg/dL 9 17   CREATININE mg/dL 0.66 0.64   GLUCOSE mg/dL 105* 144*   CALCIUM mg/dL 8.4* 9.3   ALT (SGPT) U/L 17 20   AST (SGOT) U/L 17 20     Estimated Creatinine Clearance: 79.1 mL/min (by C-G formula based on SCr of 0.66 mg/dL).    Microbiology Results Abnormal     Procedure Component Value - Date/Time    Gastrointestinal Panel, PCR - Stool, Per Rectum [[id number]]  (Abnormal) Collected:  19 0616     Lab Status:  Final result Specimen:  Stool from Per Rectum Updated:  11/03/19 1521     Campylobacter Not Detected     Plesiomonas shigelloides Not Detected     Salmonella Not Detected     Vibrio Not Detected     Vibrio cholerae Not Detected     Yersinia enterocolitica Not Detected     Enteroaggregative E. coli (EAEC) Not Detected     Enteropathogenic E. coli (EPEC) Not Detected     Enterotoxigenic E. coli (ETEC) lt/st Not Detected     Shiga-like toxin-producing E. coli (STEC) stx1/stx2 Not Detected     E. coli O157 Not Detected     Shigella/Enteroinvasive E. coli (EIEC) Not Detected     Cryptosporidium Not Detected     Cyclospora cayetanensis Not Detected     Entamoeba histolytica Not Detected     Giardia lamblia Not Detected     Adenovirus F40/41 Not Detected     Astrovirus Not Detected     Norovirus GI/GII Detected     Rotavirus A Not Detected     Sapovirus (I, II, IV or V) Not Detected          Imaging Results (Last 24 Hours)     Procedure Component Value Units Date/Time    CT Abdomen Pelvis With Contrast [[id number]] Collected:  11/02/19 2101     Updated:  11/02/19 2103    Narrative:       CT Abdomen Pelvis W    INDICATION:   Central abdominal pain with fever and vomiting starting early this morning. Prior splenectomy, hysterectomy and cholecystectomy.    TECHNIQUE:   CT of the abdomen and pelvis with IV contrast. Coronal and sagittal reconstructions were obtained.  Radiation dose reduction techniques included automated exposure control or exposure modulation based on body size. Count of known CT and cardiac nuc med  studies performed in previous 12 months: 1.     COMPARISON:   CT abdomen and pelvis 1/18/2019    FINDINGS:  Abdomen: Liver unremarkable. Gallbladder surgically absent. Patient status post splenectomy with a sizable splenule in the left upper quadrant measuring up to 4.8 cm. Pancreas, kidneys and adrenal glands appear normal. No free air. No free fluid. Mild  increase in small bowel fluid and gas.  This is nonspecific but could represent enteritis. A few scattered sigmoid diverticula but no evidence of diverticulitis. No obstruction.    Pelvis: Bladder unremarkable. Uterus surgically absent. Osseous structures and soft tissues appear normal.      Impression:       Mild increase in small bowel gas and fluid could represent an early enteritis. No focal inflammatory change or obstruction.    Status post splenectomy with sizable splenule left upper quadrant.          Signer Name: COREY Paredes MD   Signed: 11/2/2019 9:01 PM   Workstation Name: [facility]    [facility]               I have reviewed the medications:  Scheduled Meds:  [START ON 11/4/2019] Pharmacy Consult  Does not apply Once   clopidogrel 75 mg Oral Daily   heparin (porcine) 5,000 Units Subcutaneous Q8H   piperacillin-tazobactam 3.375 g Intravenous Q8H   sodium chloride 10 mL Intravenous Q12H   vancomycin 15 mg/kg Intravenous Q12H     Continuous Infusions:  dextrose 5 % and sodium chloride 0.45 % with KCl 20 mEq/L 100 mL/hr Last Rate: 100 mL/hr (11/03/19 0623)   Pharmacy to dose vancomycin       PRN Meds:.acetaminophen **OR** acetaminophen **OR** acetaminophen  •  Pharmacy to dose vancomycin  •  promethazine **OR** promethazine **OR** promethazine **OR** promethazine  •  sodium chloride  •  sodium chloride      Assessment/Plan   Assessment / Plan     Active Hospital Problems    Diagnosis  POA   • **Fever [R50.9]  Yes   • Acquired asplenia [Z90.81]  Not Applicable   • Sepsis, unspecified organism (CMS/HCC) [A41.9]  Yes   • Enteritis [K52.9]  Yes   • Mixed hyperlipidemia [E78.2]  Yes   • Gastroesophageal reflux disease [K21.9]  Yes      Resolved Hospital Problems   No resolved problems to display.        Brief Hospital Course to date:  Antonieta Howe is a 59 y.o. female with history of reticulitis, asthma and splenic infarct presents with nausea vomiting, fever and abdominal pain.  Positive sick  contacts.    Gastroenteritis  -Patient initially started on empiric antibiotics given her asplenic state.  Blood cultures have remained negative, however norovirus PCR is positive.  We will continue supportive care.  Likely DC antibiotics in the morning if cultures otherwise remain unremarkable and patient continues to improve clinically.    Hypokalemia  -Potassium added to IV fluids.  Place potassium and magnesium as needed.  Check BMP and magnesium level in the morning.    History of splenic infarct  -continue Plavix    DVT Prophylaxis:  heparin    Disposition: I expect the patient to be discharged home in 1-2 days.    CODE STATUS:   Code Status and Medical Interventions:   Ordered at: 11/03/19 0010     Level Of Support Discussed With:    Patient     Code Status:    CPR     Medical Interventions (Level of Support Prior to Arrest):    Full         Electronically signed by Bry Cai MD, 11/03/19, 4:26 PM.

## 2019-11-03 NOTE — H&P
"    [facility] Medicine Services  HISTORY AND PHYSICAL    Patient Name: Antonieta Howe  : 1960  MRN: [id number]  Primary Care Physician: Tavo Valderrama MD  Date of admission: 2019      Subjective   Subjective     Chief Complaint:  Fever, abdominal pain, N/V  History of splenectomy     HPI:  Antonieta Howe is a 59 y.o. 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  Patient states that she awoke at 1 AM with nausea and vomiting and had about 4 episodes of violent emesis, which was bilious in nature and denies hematemesis.  Patient states her daughter was sick recently with a GI \"bug\" and she states her symptoms are very similar.  The patient states she also had sharp abdominal pain, and one episode of loose stools (denies bloody stools).  Her T-max at home was 100.7 °F orally.  Patient also had chills.  She contacted her primary care physician who called in Phenergan.  Patient states she took Phenergan twice today without any relief and she continued to vomit.  Patient has been unable to keep any p.o. liquids down due to her nausea.  Per ER notes patient's primary care physician Dr. Valderrama suggested that she come to the ER for further evaluation given her history of splenectomy and fever.  Patient states she went to the emergency room around 7:30 PM tonight.    In the ED patient noted to have a T-max of 99.6 °F with slight tachycardia and mild hypotension with a systolic blood pressure of 90.  Patient is satting well on room air.  She denies chest pain but complains of persistent nausea.  She received 1 L normal saline bolus, IV Zosyn 3.375 g, IV Zofran and underwent a CT abdomen and pelvis with IV contrast that showed mild enteritis.  Lactate is normal at 1.2.  BUN/creatinine ratio was " slightly elevated however LFTs and lipase are normal.  White count is normal.  UA positive for ketones.  Potassium slightly low at 3.4.    Patient states that she is up-to-date on all of her vaccinations.  She states that she takes Plavix for her history of splenic infarct.  Denies any recent antibiotic use or recent travel.  She does work as a nurse in the hospital.  Patient states her gastroenterologist is Dr. Valderrama and her last colonoscopy was in 2018 showing diverticulitis and irritable bowel syndrome as well as possible chronic ileitis.    Hospital medicine will admit the patient for further evaluation and treatment.          Review of Systems   Constitutional: Positive for chills, fatigue and fever.   HENT: Negative.    Eyes: Negative.    Respiratory: Negative.    Cardiovascular: Negative.    Gastrointestinal: Positive for abdominal pain, diarrhea, nausea and vomiting.   Endocrine: Negative.    Genitourinary: Negative.    Musculoskeletal: Positive for back pain.   Skin: Negative.    Allergic/Immunologic: Negative.    Neurological: Positive for weakness.   Hematological: Negative.    Psychiatric/Behavioral: Negative.    All other systems reviewed and are negative.       All other systems reviewed and are negative.     Personal History     Past Medical History:   Diagnosis Date   • Achilles tendonitis    • Esophagitis    • Gastrointestinal disorder    • Hepatitis    • Hyperlipidemia    • Osteoarthritis    • Osteoporosis    • Plantar fasciitis    • Skin disorder    • Splenic infarct        Past Surgical History:   Procedure Laterality Date   •  SECTION     • CHOLECYSTECTOMY     • HYSTERECTOMY     • LAPAROSCOPIC TUBAL LIGATION     • LAPAROSCOPY DIAGNOSTIC / BIOPSY / ASPIRATION / LYSIS     • SPLENECTOMY         Family History: family history includes Depression in an other family member; Heart attack in her mother and another family member; Heart disease in an other family member; Hypertension in her  mother and another family member; Stroke in her father, mother, and another family member. Otherwise pertinent FHx was reviewed and unremarkable.     Social History:  reports that she quit smoking about 20 years ago. She has a 5.00 pack-year smoking history. She has never used smokeless tobacco. She reports that she does not drink alcohol or use drugs.  Social History     Social History Narrative    , works as a nurse at [employer]       Medications:    Available home medication information reviewed.  Medications Prior to Admission   Medication Sig Dispense Refill Last Dose   • albuterol 108 (90 Base) MCG/ACT inhaler Inhale 2 puffs Every 4 (Four) Hours As Needed for Wheezing.   Taking   • Calcium Polycarbophil (FIBER-CAPS PO) Take  by mouth Daily.   Taking   • clopidogrel (PLAVIX) 75 MG tablet TAKE ONE TABLET BY MOUTH DAILY 90 tablet 1    • Coenzyme Q10 (COQ10) 200 MG capsule Take 200 mg by mouth Take As Directed.   Taking   • esomeprazole (nexIUM) 40 MG capsule Take 40 mg by mouth Take As Directed.   Taking   • famotidine (PEPCID) 20 MG tablet Take 20 mg by mouth 2 (Two) Times a Day.      • methocarbamol (ROBAXIN) 500 MG tablet Take 1 tablet by mouth Daily As Needed for Muscle Spasms. 30 tablet 0    • nebivolol (BYSTOLIC) 5 MG tablet Take 5 mg by mouth Take As Directed.   Taking   • pravastatin (PRAVACHOL) 40 MG tablet Take 1 tablet by mouth Take As Directed. 90 tablet 0    • Probiotic Product (PROBIOTIC ADVANCED PO) Take  by mouth Daily.   Taking   • VITAMIN D, CHOLECALCIFEROL, PO Take 5,000 Units by mouth Take As Directed.   Taking   • MULTIPLE VITAMIN PO Take 1 tablet by mouth Take As Directed.   Taking   • raNITIdine (ZANTAC) 300 MG tablet Take 1 tablet by mouth 2 (Two) Times a Day. 90 tablet 0    • risedronate (ACTONEL) 150 MG tablet Take 1 tablet by mouth Every 30 (Thirty) Days. with water on empty stomach, nothing by mouth or lie down for next 30 minutes. 3 tablet 1    • risedronate (ACTONEL) 150 MG  tablet Take 1 tablet by mouth Every 30 (Thirty) Days. with water on empty stomach, nothing by mouth or lie down for next 30 minutes. 3 tablet 1        Allergies   Allergen Reactions   • Levaquin [Levofloxacin] Other (See Comments)     Patient states she gets really bad plantar fasciitis   • Bactrim [Sulfamethoxazole-Trimethoprim] Itching and Rash   • Beeswax Rash   • Other Itching and Rash     Kapidex       Objective   Objective     Vital Signs:   Temp:  [98.9 °F (37.2 °C)-99.6 °F (37.6 °C)] 99.3 °F (37.4 °C)  Heart Rate:  [] 89  Resp:  [18] 18  BP: ()/(54-80) 120/58        Physical Exam   Constitutional: Awake, alert  Eyes: PERRLA, sclerae anicteric, mildly injected conjunctiva  HENT: NCAT, mucous membranes moist  Neck: Supple, no thyromegaly, no lymphadenopathy, trachea midline  Respiratory: Clear to auscultation bilaterally, nonlabored respirations   Cardiovascular: tachy, no murmurs, rubs, or gallops, palpable pedal pulses bilaterally  Gastrointestinal: hyperactive bowel sounds, soft, TTP RUL and MARGARETH quadrants  Musculoskeletal: No bilateral ankle edema, no clubbing or cyanosis to extremities  Psychiatric: Appropriate affect, cooperative  Neurologic: Oriented x 3, strength symmetric in all extremities, Cranial Nerves grossly intact to confrontation, speech clear  Skin: No rashes      Results Reviewed:  I have personally reviewed current lab and radiology data.    Results from last 7 days   Lab Units 11/02/19 1956   WBC 10*3/mm3 9.54   HEMOGLOBIN g/dL 14.7   HEMATOCRIT % 45.7   PLATELETS 10*3/mm3 377     Results from last 7 days   Lab Units 11/02/19 1956   SODIUM mmol/L 140   POTASSIUM mmol/L 3.4*   CHLORIDE mmol/L 102   CO2 mmol/L 24.0   BUN mg/dL 17   CREATININE mg/dL 0.64   GLUCOSE mg/dL 144*   CALCIUM mg/dL 9.3   ALT (SGPT) U/L 20   AST (SGOT) U/L 20   LACTATE mmol/L 1.2     Estimated Creatinine Clearance: 81.6 mL/min (by C-G formula based on SCr of 0.64 mg/dL).  Brief Urine Lab Results  (Last  result in the past 365 days)      Color   Clarity   Blood   Leuk Est   Nitrite   Protein   CREAT   Urine HCG        11/02/19 2[address] [place] Clear Negative Negative Negative Negative             Imaging Results (Last 24 Hours)     Procedure Component Value Units Date/Time    CT Abdomen Pelvis With Contrast [[id number]] Collected:  11/02/19 2101     Updated:  11/02/19 2103    Narrative:       CT Abdomen Pelvis W    INDICATION:   Central abdominal pain with fever and vomiting starting early this morning. Prior splenectomy, hysterectomy and cholecystectomy.    TECHNIQUE:   CT of the abdomen and pelvis with IV contrast. Coronal and sagittal reconstructions were obtained.  Radiation dose reduction techniques included automated exposure control or exposure modulation based on body size. Count of known CT and cardiac nuc med  studies performed in previous 12 months: 1.     COMPARISON:   CT abdomen and pelvis 1/18/2019    FINDINGS:  Abdomen: Liver unremarkable. Gallbladder surgically absent. Patient status post splenectomy with a sizable splenule in the left upper quadrant measuring up to 4.8 cm. Pancreas, kidneys and adrenal glands appear normal. No free air. No free fluid. Mild  increase in small bowel fluid and gas. This is nonspecific but could represent enteritis. A few scattered sigmoid diverticula but no evidence of diverticulitis. No obstruction.    Pelvis: Bladder unremarkable. Uterus surgically absent. Osseous structures and soft tissues appear normal.      Impression:       Mild increase in small bowel gas and fluid could represent an early enteritis. No focal inflammatory change or obstruction.    Status post splenectomy with sizable splenule left upper quadrant.          Signer Name: COREY Paredes MD   Signed: 11/2/2019 9:01 PM   Workstation Name: [facility]    [facility]             Assessment/Plan   Assessment / Plan     Active Hospital Problems    Diagnosis POA   • **Fever [R50.9]  Yes   • Acquired asplenia [Z90.81] Not Applicable   • Sepsis, unspecified organism (CMS/HCC) [A41.9] Unknown   • Enteritis [K52.9] Unknown   • Mixed hyperlipidemia [E78.2] Yes   • Gastroesophageal reflux disease [K21.9] Yes     59-year-old woman with history of splenic infarct and splenectomy as well as hyperlipidemia who had acute onset of abdominal pain and vomiting after sick contact with her daughter for similar complaints.  SHe then developed fever tachycardia admitted for rule out sepsis.    1. Possible Sepsis  - elevated HR, fever, suspected infection  - likely secondary to #2  - panculture, IVF, trend lactate, empiric abx    2. Asplenic fever  - cover with IV zosyn/vanc   - states she is UTD with vaccinations  - continue plavix    3. Enteritis- Sick contact with her daughter with similar symptoms  - check GI panel  - supportive care  - zofran/phenergan, IVF  - contact precautions    4. Hypokalemia  - K 3.4  - replete electrolytes per protocol  - recheck AM BMP      DVT prophylaxis:  Heparin SQ TID  SCDs    CODE STATUS:    Code Status and Medical Interventions:   Ordered at: 11/03/19 0010     Level Of Support Discussed With:    Patient     Code Status:    CPR     Medical Interventions (Level of Support Prior to Arrest):    Full       Electronically signed by RY Rojo, 11/03/19, 12:20 AM.    Brief Attending Note   I have seen and examined the patient, performing an independent face-to-face diagnostic evaluation with plan of care reviewed and developed with the advanced practice clinician (APC).  Brief Summary Statement/HPI:   59-year-old woman history of a splenic infarct while on hormone replacement therapy who presents with acute onset of abdominal pain intractable nausea and vomiting that began approximately 24 hours ago.  Patient has had progressive pain and vomiting then had a fever of 100.7 and she was encouraged to go to the emergency room for evaluation.  She only complains of epigastric pain  without radiation, nausea is improved.  She denies fever currently with she denies chills or sweats.  She denies any change in urine output or diarrhea.  Attending Physical Exam:  Patient is alert and talkative in no distress at rest she is nontoxic but dry, very thin  Neck is without mass or JVD  Heart is Reg wo murmur  Lungs are clear wo wheeze or crackle  Abd is soft without HSM or mass, tender in epigastrium no rebound no guarding, no CVA tenderness  MAEW  Skin is without rash  Neurologic exam in nonfocal   Mood is appropriate  Data:  Labs and radiology studies have been reviewed.  Brief Assessment/Plan :  See above for further detailed assessment and plan developed with APC which I have reviewed and/or edited.    I believe this patient meets observation status for now    Electronically signed by Marta Kim MD 11/03/19 1:16 AM

## 2019-11-04 VITALS
BODY MASS INDEX: 25.62 KG/M2 | RESPIRATION RATE: 18 BRPM | HEART RATE: 68 BPM | DIASTOLIC BLOOD PRESSURE: 64 MMHG | SYSTOLIC BLOOD PRESSURE: 113 MMHG | OXYGEN SATURATION: 97 % | WEIGHT: 135.7 LBS | HEIGHT: 61 IN | TEMPERATURE: 98.5 F

## 2019-11-04 PROBLEM — R50.9 FEVER: Status: RESOLVED | Noted: 2019-11-02 | Resolved: 2019-11-04

## 2019-11-04 PROBLEM — A41.9 SEPSIS, UNSPECIFIED ORGANISM: Status: RESOLVED | Noted: 2019-11-03 | Resolved: 2019-11-04

## 2019-11-04 LAB
ANION GAP SERPL CALCULATED.3IONS-SCNC: 9 MMOL/L (ref 5–15)
BUN BLD-MCNC: 4 MG/DL (ref 6–20)
BUN/CREAT SERPL: 6.8 (ref 7–25)
CALCIUM SPEC-SCNC: 8.3 MG/DL (ref 8.6–10.5)
CHLORIDE SERPL-SCNC: 108 MMOL/L (ref 98–107)
CO2 SERPL-SCNC: 26 MMOL/L (ref 22–29)
CREAT BLD-MCNC: 0.59 MG/DL (ref 0.57–1)
GFR SERPL CREATININE-BSD FRML MDRD: 105 ML/MIN/1.73
GLUCOSE BLD-MCNC: 103 MG/DL (ref 65–99)
MAGNESIUM SERPL-MCNC: 2 MG/DL (ref 1.6–2.6)
POTASSIUM BLD-SCNC: 4.3 MMOL/L (ref 3.5–5.2)
SODIUM BLD-SCNC: 143 MMOL/L (ref 136–145)

## 2019-11-04 PROCEDURE — 99217 PR OBSERVATION CARE DISCHARGE MANAGEMENT: CPT | Performed by: INTERNAL MEDICINE

## 2019-11-04 PROCEDURE — G0378 HOSPITAL OBSERVATION PER HR: HCPCS

## 2019-11-04 PROCEDURE — 80048 BASIC METABOLIC PNL TOTAL CA: CPT | Performed by: INTERNAL MEDICINE

## 2019-11-04 PROCEDURE — 83735 ASSAY OF MAGNESIUM: CPT | Performed by: INTERNAL MEDICINE

## 2019-11-04 RX ADMIN — FAMOTIDINE 20 MG: 20 TABLET, FILM COATED ORAL at 08:13

## 2019-11-04 RX ADMIN — CLOPIDOGREL BISULFATE 75 MG: 75 TABLET ORAL at 08:13

## 2019-11-04 NOTE — SIGNIFICANT NOTE
Reported by RN pt broke out in a puritic rash after zosyn dose tonight; pt is concerned about receiving next vancomycin dose d/t itching of her scalp with her last dose of vancomycin; per review of most recent progress note and plans to stop antibiotics tomorrow and discussion with Dayan Birch MD zosyn and vancomycin were d/c'd. Clinically patient is improving, will continue to follow cultures and re-address if needed.

## 2019-11-04 NOTE — PROGRESS NOTES
Discharge Planning Assessment  [facility]     Patient Name: Antonieta Howe  MRN: [id number]  Today's Date: 11/4/2019    Admit Date: 11/2/2019    Discharge Needs Assessment     Row Name 11/04/19 1035       Living Environment    Lives With  spouse;child(evy), adult    Name(s) of Who Lives With Patient  spouse is Joshua Howe    Current Living Arrangements  home/apartment/condo    Primary Care Provided by  self    Provides Primary Care For  no one    Family Caregiver if Needed  spouse    Quality of Family Relationships  supportive    Able to Return to Prior Arrangements  yes       Resource/Environmental Concerns    Resource/Environmental Concerns  none    Transportation Concerns  car, none       Transition Planning    Patient/Family Anticipates Transition to  home with family    Patient/Family Anticipated Services at Transition  none    Transportation Anticipated  family or friend will provide       Discharge Needs Assessment    Readmission Within the Last 30 Days  no previous admission in last 30 days    Concerns to be Addressed  no discharge needs identified    Equipment Currently Used at Home  none    Anticipated Changes Related to Illness  none    Equipment Needed After Discharge  none        Discharge Plan     Row Name 11/04/19 1036       Plan    Plan  Home with family    Patient/Family in Agreement with Plan  yes    Plan Comments  I spoke with patient this morning to discuss discharge planning. She is a pleasant 59 year old  lady from [place]. No discharge planning needs identified.     Final Discharge Disposition Code  01 - home or self-care        Destination      No service coordination in this encounter.      Durable Medical Equipment      No service coordination in this encounter.      Dialysis/Infusion      No service coordination in this encounter.      Home Medical Care      No service coordination in this encounter.      Therapy      No service coordination in this encounter.      Community  Resources      No service coordination in this encounter.        Expected Discharge Date and Time     Expected Discharge Date Expected Discharge Time    Nov 4, 2019         Demographic Summary     Row Name 11/04/19 1034       General Information    Admission Type  observation    Referral Source  admission list    Preferred Language  English    General Information Comments  PCP is Tavo Valderrama       Contact Information    Permission Granted to Share Info With      Contact Information Comments  [phone]        Functional Status     Row Name 11/04/19 1035       Functional Status    Usual Activity Tolerance  excellent    Current Activity Tolerance  excellent       Functional Status, IADL    Medications  independent    Meal Preparation  independent    Housekeeping  independent    Laundry  independent    Shopping  independent       Employment/    Employment/ Comments Patient has [employer] insurance and denies concerns regarding coverage or disruption in coverage issues. Patient has drug coverage and denies issues obtaining or affording current medications.  .I gave her [facility] information on how to apply for FML.         Psychosocial    No documentation.       Abuse/Neglect    No documentation.       Legal    No documentation.       Substance Abuse    No documentation.       Patient Forms    No documentation.           Bibiana Tena RN

## 2019-11-04 NOTE — PROGRESS NOTES
"    [facility] Medicine Services  DISCHARGE SUMMARY    Patient Name: Antonieta Howe  : 1960  MRN: [id number]    Date of Admission: 2019  Date of Discharge: 19  Primary Care Physician: Tavo Valderrama MD    Consults     No orders found from 10/4/2019 to 11/3/2019.          Hospital Course     Presenting Problem:   Fever [R50.9]    Active Hospital Problems    Diagnosis  POA   • Acquired asplenia [Z90.81]  Not Applicable   • Enteritis [K52.9]  Yes   • Mixed hyperlipidemia [E78.2]  Yes   • Gastroesophageal reflux disease [K21.9]  Yes      Resolved Hospital Problems    Diagnosis Date Resolved POA   • **Fever [R50.9] 2019 Yes   • Sepsis, unspecified organism (CMS/HCC) [A41.9] 2019 Yes          Hospital Course:  Antonieta Howe is a 59 y.o. female with history of reticulitis, asthma and splenic infarct presents with nausea vomiting, fever and abdominal pain.     Patient states that she awoke at 1 AM with nausea and vomiting and had about 4 episodes of violent emesis, which was bilious in nature and denies hematemesis.  Patient states her daughter was sick recently with a GI \"bug\" and she states her symptoms are very similar.  The patient states she also had sharp abdominal pain, and one episode of loose stools (denies bloody stools).  Her T-max at home was 100.7 °F orally.  Patient also had chills.  She contacted her primary care physician who called in Phenergan.  Patient states she took Phenergan twice today without any relief and she continued to vomit.  Patient has been unable to keep any p.o. liquids down due to her nausea.         Gastroenteritis, secondary to Norovirus  - patient initially started on broad spectrum antibiotics given asplenic state, IV fluids and supportive care for GI symptoms.   - Norovirus PCR returned positive, other cultures remained negative, temperature cure improved, antibiotics discontinued  - patient endorses improvement in symptoms -- " stomach feels queasy but no emesis, able to tolerate bland diet. Denies loose stool.   - patient feels she can manage at home and agrees to remain well hydrated. Will seek medical care if symptoms worsen or are concerning. Discussed case with PCP Dr Valderrama who will see Ms Howe in clinic later this week.    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 This will likely delay her return until early next week. Also discussed infection control with good hand hygiene (hand washing) after toileting, etc.    History of splenic infarct  -continue Plavix      Discharge Follow Up Recommendations for labs/diagnostics:      Day of Discharge     HPI:   Feels better, tolerating bland diet, no further emesis, stomach feels a bit queasy. Low grade temp last night, no fever today.    Review of Systems  Gen- No fevers, chills  CV- No chest pain, palpitations  Resp- No cough, dyspnea  GI- as above        Otherwise ROS is negative except as mentioned in the HPI.    Vital Signs:   Temp:  [98.5 °F (36.9 °C)-99.3 °F (37.4 °C)] 98.5 °F (36.9 °C)  Heart Rate:  [68-79] 68  Resp:  [16-18] 18  BP: (107-122)/(58-75) 113/64     Physical Exam:  Constitutional -no acute distress, non toxic, in bed  HEENT-NCAT, mucous membranes moist  CV-RRR, S1 S2 normal, no m/r/g  Resp-CTAB, no wheezes, rhonchi or rales  Abd-soft, non-tender, non-distended, normo active bowel sounds  Ext-No lower extremity cyanosis, clubbing or edema bilaterally  Neuro-alert and oriented, speech clear, moves all extremities   Psych-normal affect   Skin- No rash on exposed UE or LE bilaterally      Pertinent  and/or Most Recent Results     Results from last 7 days   Lab Units 11/04/19  0512 11/03/19  0857 11/02/19 1956   WBC 10*3/mm3  --  6.02 9.54   HEMOGLOBIN g/dL  --  13.2 14.7   HEMATOCRIT %  --  42.3 45.7   PLATELETS 10*3/mm3  --  364  377   SODIUM mmol/L 143 141 140   POTASSIUM mmol/L 4.3 3.2* 3.4*   CHLORIDE mmol/L 108* 102 102   CO2 mmol/L 26.0 28.0 24.0   BUN mg/dL 4* 9 17   CREATININE mg/dL 0.59 0.66 0.64   GLUCOSE mg/dL 103* 105* 144*   CALCIUM mg/dL 8.3* 8.4* 9.3     Results from last 7 days   Lab Units 11/03/19  0857 11/02/19 1956   BILIRUBIN mg/dL 1.2 1.1   ALK PHOS U/L 80 96   ALT (SGPT) U/L 17 20   AST (SGOT) U/L 17 20           Invalid input(s): TG, LDLCALC, LDLREALC  Results from last 7 days   Lab Units 11/03/19  0857 11/02/19 1956   LACTATE mmol/L 2.0 1.2       Brief Urine Lab Results  (Last result in the past 365 days)      Color   Clarity   Blood   Leuk Est   Nitrite   Protein   CREAT   Urine HCG        11/02/19 2203 [place] Clear Negative Negative Negative Negative               Microbiology Results Abnormal     Procedure Component Value - Date/Time    Blood Culture - Blood, Hand, Left [[id number]] Collected:  11/02/19 2022    Lab Status:  Preliminary result Specimen:  Blood from Hand, Left Updated:  11/03/19 1945     Blood Culture No growth at 24 hours    Blood Culture - Blood, Hand, Right [[id number]] Collected:  11/02/19 2010    Lab Status:  Preliminary result Specimen:  Blood from Hand, Right Updated:  11/03/19 1945     Blood Culture No growth at 24 hours    Gastrointestinal Panel, PCR - Stool, Per Rectum [[id number]]  (Abnormal) Collected:  11/03/19 0720    Lab Status:  Final result Specimen:  Stool from Per Rectum Updated:  11/03/19 1521     Campylobacter Not Detected     Plesiomonas shigelloides Not Detected     Salmonella Not Detected     Vibrio Not Detected     Vibrio cholerae Not Detected     Yersinia enterocolitica Not Detected     Enteroaggregative E. coli (EAEC) Not Detected     Enteropathogenic E. coli (EPEC) Not Detected     Enterotoxigenic E. coli (ETEC) lt/st Not Detected     Shiga-like toxin-producing E. coli (STEC) stx1/stx2 Not Detected     E. coli O157 Not Detected     Shigella/Enteroinvasive E. coli (EIEC) Not  Detected     Cryptosporidium Not Detected     Cyclospora cayetanensis Not Detected     Entamoeba histolytica Not Detected     Giardia lamblia Not Detected     Adenovirus F40/41 Not Detected     Astrovirus Not Detected     Norovirus GI/GII Detected     Rotavirus A Not Detected     Sapovirus (I, II, IV or V) Not Detected          Imaging Results (All)     Procedure Component Value Units Date/Time    CT Abdomen Pelvis With Contrast [[id number]] Collected:  11/02/19 2101     Updated:  11/02/19 2103    Narrative:       CT Abdomen Pelvis W    INDICATION:   Central abdominal pain with fever and vomiting starting early this morning. Prior splenectomy, hysterectomy and cholecystectomy.    TECHNIQUE:   CT of the abdomen and pelvis with IV contrast. Coronal and sagittal reconstructions were obtained.  Radiation dose reduction techniques included automated exposure control or exposure modulation based on body size. Count of known CT and cardiac nuc med  studies performed in previous 12 months: 1.     COMPARISON:   CT abdomen and pelvis 1/18/2019    FINDINGS:  Abdomen: Liver unremarkable. Gallbladder surgically absent. Patient status post splenectomy with a sizable splenule in the left upper quadrant measuring up to 4.8 cm. Pancreas, kidneys and adrenal glands appear normal. No free air. No free fluid. Mild  increase in small bowel fluid and gas. This is nonspecific but could represent enteritis. A few scattered sigmoid diverticula but no evidence of diverticulitis. No obstruction.    Pelvis: Bladder unremarkable. Uterus surgically absent. Osseous structures and soft tissues appear normal.      Impression:       Mild increase in small bowel gas and fluid could represent an early enteritis. No focal inflammatory change or obstruction.    Status post splenectomy with sizable splenule left upper quadrant.          Signer Name: COREY Paredes MD   Signed: 11/2/2019 9:01 PM   [email or web address]    [facility]  [facility]avaleta                         [unfilled]  Discharge Details        Discharge Medications      Continue These Medications      Instructions Start Date   albuterol sulfate  (90 Base) MCG/ACT inhaler  Commonly known as:  PROVENTIL HFA;VENTOLIN HFA;PROAIR HFA   2 puffs, Inhalation, Every 4 Hours PRN      clopidogrel 75 MG tablet  Commonly known as:  PLAVIX   TAKE ONE TABLET BY MOUTH DAILY      CoQ10 200 MG capsule   200 mg, Oral, Take As Directed      esomeprazole 40 MG capsule  Commonly known as:  nexIUM   40 mg, Oral, Take As Directed      famotidine 20 MG tablet  Commonly known as:  PEPCID   20 mg, Oral, 2 Times Daily      FIBER-CAPS PO   Oral, Daily      methocarbamol 500 MG tablet  Commonly known as:  ROBAXIN   500 mg, Oral, Daily PRN      MULTIPLE VITAMIN PO   1 tablet, Oral, Take As Directed      nebivolol 5 MG tablet  Commonly known as:  BYSTOLIC   5 mg, Oral, Take As Directed      pravastatin 40 MG tablet  Commonly known as:  PRAVACHOL   40 mg, Oral, Take As Directed      PROBIOTIC ADVANCED PO   Oral, Daily      raNITIdine 300 MG tablet  Commonly known as:  ZANTAC   300 mg, Oral, 2 Times Daily      risedronate 150 MG tablet  Commonly known as:  ACTONEL   150 mg, Oral, Every 30 Days, with water on empty stomach, nothing by mouth or lie down for next 30 minutes.       risedronate 150 MG tablet  Commonly known as:  ACTONEL   150 mg, Oral, Every 30 Days, with water on empty stomach, nothing by mouth or lie down for next 30 minutes.      VITAMIN D (CHOLECALCIFEROL) PO   5,000 Units, Oral, Take As Directed             Allergies   Allergen Reactions   • Levaquin [Levofloxacin] Other (See Comments)     Patient states she gets really bad plantar fasciitis   • Bactrim [Sulfamethoxazole-Trimethoprim] Itching and Rash   • Beeswax Rash   • Other Itching and Rash     Kapidex   • Zosyn [Piperacillin Sod-Tazobactam So] Rash         Discharge Disposition:  Home or Self Care    Diet:  Hospital:  Diet Order    Procedures   • Diet Regular     Discharge:     Discharge Activity:         CODE STATUS:    Code Status and Medical Interventions:   Ordered at: 11/03/19 0010     Level Of Support Discussed With:    Patient     Code Status:    CPR     Medical Interventions (Level of Support Prior to Arrest):    Full         Future Appointments   Date Time Provider Department Center   1/31/2020  2:15 PM Americo Flores DO [facility] None   follow up PCP Dr Lavelle crane this week    [unfilled]    Time Spent on Discharge:  40 minutes    Electronically signed by Bry Cai MD, 11/04/19, 9:58 AM.

## 2019-11-04 NOTE — PLAN OF CARE
Problem: Patient Care Overview  Goal: Plan of Care Review  Outcome: Ongoing (interventions implemented as appropriate)   11/04/19 0423   Coping/Psychosocial   Plan of Care Reviewed With patient   Plan of Care Review   Progress improving   OTHER   Outcome Summary No c/o of n/v or pain. PT developed rash after dose of Zosyn, benadryl given, APRN notified abx discontinued, see provider note.       Problem: Pain, Acute (Adult)  Goal: Acceptable Pain Control/Comfort Level  Outcome: Ongoing (interventions implemented as appropriate)   11/04/19 0423   Pain, Acute (Adult)   Acceptable Pain Control/Comfort Level making progress toward outcome       Problem: Nausea/Vomiting (Adult)  Goal: Adequate Hydration  Outcome: Ongoing (interventions implemented as appropriate)   11/04/19 0423   Nausea/Vomiting (Adult)   Adequate Hydration making progress toward outcome       Problem: Pain, Chronic (Adult)  Goal: Acceptable Pain/Comfort Level and Functional Ability  Outcome: Ongoing (interventions implemented as appropriate)   11/04/19 0423   Pain, Chronic (Adult)   Acceptable Pain/Comfort Level and Functional Ability making progress toward outcome

## 2019-11-05 NOTE — DISCHARGE SUMMARY
"     [facility] Medicine Services  DISCHARGE SUMMARY     Patient Name: Antonieta Howe  : 1960  MRN: [id number]     Date of Admission: 2019  Date of Discharge: 19  Primary Care Physician: Tavo Valderrama MD         Consults      No orders found from 10/4/2019 to 11/3/2019.             Hospital Course      Presenting Problem:   Fever [R50.9]           Active Hospital Problems     Diagnosis   POA   • Acquired asplenia [Z90.81]   Not Applicable   • Enteritis [K52.9]   Yes   • Mixed hyperlipidemia [E78.2]   Yes   • Gastroesophageal reflux disease [K21.9]   Yes       Resolved Hospital Problems     Diagnosis Date Resolved POA   • **Fever [R50.9] 2019 Yes   • Sepsis, unspecified organism (CMS/HCC) [A41.9] 2019 Yes            Hospital Course:  Antonieta Howe is a 59 y.o. female with history of reticulitis, asthma and splenic infarct presents with nausea vomiting, fever and abdominal pain.      Patient states that she awoke at 1 AM with nausea and vomiting and had about 4 episodes of violent emesis, which was bilious in nature and denies hematemesis.  Patient states her daughter was sick recently with a GI \"bug\" and she states her symptoms are very similar.  The patient states she also had sharp abdominal pain, and one episode of loose stools (denies bloody stools).  Her T-max at home was 100.7 °F orally.  Patient also had chills.  She contacted her primary care physician who called in Phenergan.  Patient states she took Phenergan twice today without any relief and she continued to vomit.  Patient has been unable to keep any p.o. liquids down due to her nausea.          Gastroenteritis, secondary to Norovirus  - patient initially started on broad spectrum antibiotics given asplenic state, IV fluids and supportive care for GI symptoms.   - Norovirus PCR returned positive, other cultures remained negative, temperature cure improved, antibiotics discontinued  - patient " endorses improvement in symptoms -- stomach feels queasy but no emesis, able to tolerate bland diet. Denies loose stool.   - patient feels she can manage at home and agrees to remain well hydrated. Will seek medical care if symptoms worsen or are concerning. Discussed case with PCP Dr Valderrama who will see Ms Howe in clinic later this week.     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 This will likely delay her return until early next week. Also discussed infection control with good hand hygiene (hand washing) after toileting, etc.     History of splenic infarct  -continue Plavix        Discharge Follow Up Recommendations for labs/diagnostics:        Day of Discharge      HPI:   Feels better, tolerating bland diet, no further emesis, stomach feels a bit queasy. Low grade temp last night, no fever today.     Review of Systems  Gen- No fevers, chills  CV- No chest pain, palpitations  Resp- No cough, dyspnea  GI- as above           Otherwise ROS is negative except as mentioned in the HPI.     Vital Signs:   Temp:  [98.5 °F (36.9 °C)-99.3 °F (37.4 °C)] 98.5 °F (36.9 °C)  Heart Rate:  [68-79] 68  Resp:  [16-18] 18  BP: (107-122)/(58-75) 113/64      Physical Exam:  Constitutional -no acute distress, non toxic, in bed  HEENT-NCAT, mucous membranes moist  CV-RRR, S1 S2 normal, no m/r/g  Resp-CTAB, no wheezes, rhonchi or rales  Abd-soft, non-tender, non-distended, normo active bowel sounds  Ext-No lower extremity cyanosis, clubbing or edema bilaterally  Neuro-alert and oriented, speech clear, moves all extremities   Psych-normal affect   Skin- No rash on exposed UE or LE bilaterally        Pertinent  and/or Most Recent Results             Results from last 7 days   Lab Units 11/04/19  0512 11/03/19  0857 11/02/19 1956   WBC 10*3/mm3  --  6.02 9.54   HEMOGLOBIN g/dL  --  13.2 14.7    HEMATOCRIT %  --  42.3 45.7   PLATELETS 10*3/mm3  --  364 377   SODIUM mmol/L 143 141 140   POTASSIUM mmol/L 4.3 3.2* 3.4*   CHLORIDE mmol/L 108* 102 102   CO2 mmol/L 26.0 28.0 24.0   BUN mg/dL 4* 9 17   CREATININE mg/dL 0.59 0.66 0.64   GLUCOSE mg/dL 103* 105* 144*   CALCIUM mg/dL 8.3* 8.4* 9.3            Results from last 7 days   Lab Units 11/03/19  0857 11/02/19 1956   BILIRUBIN mg/dL 1.2 1.1   ALK PHOS U/L 80 96   ALT (SGPT) U/L 17 20   AST (SGOT) U/L 17 20             Invalid input(s): TG, LDLCALC, LDLREALC  Results from last 7 days   Lab Units 11/03/19  0857 11/02/19 1956   LACTATE mmol/L 2.0 1.2                               Brief Urine Lab Results  (Last result in the past 365 days)       Color   Clarity   Blood   Leuk Est   Nitrite   Protein   CREAT   Urine HCG         11/02/19 2203 [place] Clear Negative Negative Negative Negative                                Microbiology Results Abnormal      Procedure Component Value - Date/Time     Blood Culture - Blood, Hand, Left [[id number]] Collected:  11/02/19 2022     Lab Status:  Preliminary result Specimen:  Blood from Hand, Left Updated:  11/03/19 1945       Blood Culture No growth at 24 hours     Blood Culture - Blood, Hand, Right [[id number]] Collected:  11/02/19 2010     Lab Status:  Preliminary result Specimen:  Blood from Hand, Right Updated:  11/03/19 1945       Blood Culture No growth at 24 hours     Gastrointestinal Panel, PCR - Stool, Per Rectum [[id number]]  (Abnormal) Collected:  11/03/19 0720     Lab Status:  Final result Specimen:  Stool from Per Rectum Updated:  11/03/19 1521       Campylobacter Not Detected       Plesiomonas shigelloides Not Detected       Salmonella Not Detected       Vibrio Not Detected       Vibrio cholerae Not Detected       Yersinia enterocolitica Not Detected       Enteroaggregative E. coli (EAEC) Not Detected       Enteropathogenic E. coli (EPEC) Not Detected       Enterotoxigenic E. coli (ETEC) lt/st Not Detected        Shiga-like toxin-producing E. coli (STEC) stx1/stx2 Not Detected       E. coli O157 Not Detected       Shigella/Enteroinvasive E. coli (EIEC) Not Detected       Cryptosporidium Not Detected       Cyclospora cayetanensis Not Detected       Entamoeba histolytica Not Detected       Giardia lamblia Not Detected       Adenovirus F40/41 Not Detected       Astrovirus Not Detected       Norovirus GI/GII Detected       Rotavirus A Not Detected       Sapovirus (I, II, IV or V) Not Detected                      Imaging Results (All)      Procedure Component Value Units Date/Time     CT Abdomen Pelvis With Contrast [[id number]] Collected:  11/02/19 2101       Updated:  11/02/19 2103     Narrative:        CT Abdomen Pelvis W     INDICATION:   Central abdominal pain with fever and vomiting starting early this morning. Prior splenectomy, hysterectomy and cholecystectomy.     TECHNIQUE:   CT of the abdomen and pelvis with IV contrast. Coronal and sagittal reconstructions were obtained.  Radiation dose reduction techniques included automated exposure control or exposure modulation based on body size. Count of known CT and cardiac nuc med  studies performed in previous 12 months: 1.      COMPARISON:   CT abdomen and pelvis 1/18/2019     FINDINGS:  Abdomen: Liver unremarkable. Gallbladder surgically absent. Patient status post splenectomy with a sizable splenule in the left upper quadrant measuring up to 4.8 cm. Pancreas, kidneys and adrenal glands appear normal. No free air. No free fluid. Mild  increase in small bowel fluid and gas. This is nonspecific but could represent enteritis. A few scattered sigmoid diverticula but no evidence of diverticulitis. No obstruction.     Pelvis: Bladder unremarkable. Uterus surgically absent. Osseous structures and soft tissues appear normal.        Impression:        Mild increase in small bowel gas and fluid could represent an early enteritis. No focal inflammatory change or  obstruction.     Status post splenectomy with sizable splenule left upper quadrant.              Signer Name: COREY Paredes MD   Signed: 11/2/2019 9:01 PM   Workstation Name: [facility]    [facility]                      [unfilled]  Discharge Details               Discharge Medications            Continue These Medications      Instructions Start Date   albuterol sulfate  (90 Base) MCG/ACT inhaler  Commonly known as:  PROVENTIL HFA;VENTOLIN HFA;PROAIR HFA    2 puffs, Inhalation, Every 4 Hours PRN        clopidogrel 75 MG tablet  Commonly known as:  PLAVIX    TAKE ONE TABLET BY MOUTH DAILY        CoQ10 200 MG capsule    200 mg, Oral, Take As Directed        esomeprazole 40 MG capsule  Commonly known as:  nexIUM    40 mg, Oral, Take As Directed        famotidine 20 MG tablet  Commonly known as:  PEPCID    20 mg, Oral, 2 Times Daily        FIBER-CAPS PO    Oral, Daily        methocarbamol 500 MG tablet  Commonly known as:  ROBAXIN    500 mg, Oral, Daily PRN        MULTIPLE VITAMIN PO    1 tablet, Oral, Take As Directed        nebivolol 5 MG tablet  Commonly known as:  BYSTOLIC    5 mg, Oral, Take As Directed        pravastatin 40 MG tablet  Commonly known as:  PRAVACHOL    40 mg, Oral, Take As Directed        PROBIOTIC ADVANCED PO    Oral, Daily        raNITIdine 300 MG tablet  Commonly known as:  ZANTAC    300 mg, Oral, 2 Times Daily        risedronate 150 MG tablet  Commonly known as:  ACTONEL    150 mg, Oral, Every 30 Days, with water on empty stomach, nothing by mouth or lie down for next 30 minutes.         risedronate 150 MG tablet  Commonly known as:  ACTONEL    150 mg, Oral, Every 30 Days, with water on empty stomach, nothing by mouth or lie down for next 30 minutes.        VITAMIN D (CHOLECALCIFEROL) PO    5,000 Units, Oral, Take As Directed                        Allergies   Allergen Reactions   • Levaquin [Levofloxacin] Other (See Comments)       Patient states she gets  "really bad plantar fasciitis   • Bactrim [Sulfamethoxazole-Trimethoprim] Itching and Rash   • Beeswax Rash   • Other Itching and Rash       Kapidex   • Zosyn [Piperacillin Sod-Tazobactam So] Rash            Discharge Disposition:  Home or Self Care     Diet:  Hospital:      Diet Order   Procedures   • Diet Regular      Discharge:      Discharge Activity:         CODE STATUS:        Code Status and Medical Interventions:   Ordered at: 11/03/19 0010     Level Of Support Discussed With:     Patient     Code Status:     CPR     Medical Interventions (Level of Support Prior to Arrest):     Full                   Future Appointments   Date Time Provider Department Center   1/31/2020  2:15 PM Americo Flores DO [facility] None   follow up PCP Dr Valderrama later this week     [unfilled]     Time Spent on Discharge:  40 minutes    NOTE: this discharge summary created earlier this morning under a \"progress\" note type, with this copy placed under the proper \"discharge summary\" note type (as note types cannot be changed in Epic). Only one charge created for the original work this morning.     Electronically signed by Bry Cai MD, 11/04/19, 9:58 AM.    "

## 2019-11-07 LAB
BACTERIA SPEC AEROBE CULT: NORMAL
BACTERIA SPEC AEROBE CULT: NORMAL

## 2020-01-17 ENCOUNTER — HOSPITAL ENCOUNTER (OUTPATIENT)
Dept: GENERAL RADIOLOGY | Facility: HOSPITAL | Age: 60
Discharge: HOME | End: 2020-01-17
Admitting: INTERNAL MEDICINE

## 2020-01-17 ENCOUNTER — TRANSCRIBE ORDERS (OUTPATIENT)
Dept: ADMINISTRATIVE | Facility: HOSPITAL | Age: 60
End: 2020-01-17

## 2020-01-17 DIAGNOSIS — M25.511 RIGHT SHOULDER PAIN, UNSPECIFIED CHRONICITY: Primary | ICD-10-CM

## 2020-01-17 DIAGNOSIS — M25.511 PAIN IN JOINT OF RIGHT SHOULDER: ICD-10-CM

## 2020-01-17 PROCEDURE — 73030 X-RAY EXAM OF SHOULDER: CPT

## 2020-01-17 PROCEDURE — 73000 X-RAY EXAM OF COLLAR BONE: CPT

## 2020-02-04 ENCOUNTER — TRANSCRIBE ORDERS (OUTPATIENT)
Dept: LAB | Facility: HOSPITAL | Age: 60
End: 2020-02-04

## 2020-02-04 ENCOUNTER — LAB (OUTPATIENT)
Dept: LAB | Facility: HOSPITAL | Age: 60
End: 2020-02-04

## 2020-02-04 DIAGNOSIS — M81.0 SENILE OSTEOPOROSIS: ICD-10-CM

## 2020-02-04 DIAGNOSIS — E55.9 AVITAMINOSIS D: ICD-10-CM

## 2020-02-04 DIAGNOSIS — I43 DILATED CARDIOMYOPATHY SECONDARY TO INFECTION (HCC): Primary | ICD-10-CM

## 2020-02-04 DIAGNOSIS — K50.919 CROHN'S DISEASE WITH COMPLICATION, UNSPECIFIED GASTROINTESTINAL TRACT LOCATION (HCC): ICD-10-CM

## 2020-02-04 DIAGNOSIS — M25.50 PAIN IN JOINT, MULTIPLE SITES: ICD-10-CM

## 2020-02-04 DIAGNOSIS — B99.9 DILATED CARDIOMYOPATHY SECONDARY TO INFECTION (HCC): ICD-10-CM

## 2020-02-04 DIAGNOSIS — I43 DILATED CARDIOMYOPATHY SECONDARY TO INFECTION (HCC): ICD-10-CM

## 2020-02-04 DIAGNOSIS — B99.9 DILATED CARDIOMYOPATHY SECONDARY TO INFECTION (HCC): Primary | ICD-10-CM

## 2020-02-04 LAB
25(OH)D3 SERPL-MCNC: 54.3 NG/ML (ref 30–100)
IGA1 MFR SER: 251 MG/DL (ref 70–400)
IGG1 SER-MCNC: 830 MG/DL (ref 700–1600)
IGM SERPL-MCNC: 30 MG/DL (ref 40–230)
TSH SERPL DL<=0.05 MIU/L-ACNC: 3.37 UIU/ML (ref 0.27–4.2)

## 2020-02-04 PROCEDURE — 83516 IMMUNOASSAY NONANTIBODY: CPT

## 2020-02-04 PROCEDURE — 85732 THROMBOPLASTIN TIME PARTIAL: CPT

## 2020-02-04 PROCEDURE — 86592 SYPHILIS TEST NON-TREP QUAL: CPT

## 2020-02-04 PROCEDURE — 84443 ASSAY THYROID STIM HORMONE: CPT

## 2020-02-04 PROCEDURE — 81374 HLA I TYPING 1 ANTIGEN LR: CPT

## 2020-02-04 PROCEDURE — 82306 VITAMIN D 25 HYDROXY: CPT

## 2020-02-04 PROCEDURE — 86255 FLUORESCENT ANTIBODY SCREEN: CPT

## 2020-02-04 PROCEDURE — 86235 NUCLEAR ANTIGEN ANTIBODY: CPT

## 2020-02-04 PROCEDURE — 86147 CARDIOLIPIN ANTIBODY EA IG: CPT

## 2020-02-04 PROCEDURE — 85705 THROMBOPLASTIN INHIBITION: CPT

## 2020-02-04 PROCEDURE — 86431 RHEUMATOID FACTOR QUANT: CPT

## 2020-02-04 PROCEDURE — 85613 RUSSELL VIPER VENOM DILUTED: CPT

## 2020-02-04 PROCEDURE — 85670 THROMBIN TIME PLASMA: CPT

## 2020-02-04 PROCEDURE — 36415 COLL VENOUS BLD VENIPUNCTURE: CPT

## 2020-02-04 PROCEDURE — 86200 CCP ANTIBODY: CPT

## 2020-02-04 PROCEDURE — 82784 ASSAY IGA/IGD/IGG/IGM EACH: CPT

## 2020-02-05 LAB — RPR SER QL: NORMAL

## 2020-02-06 LAB
CARDIOLIPIN IGA SER IA-ACNC: <9 APL U/ML (ref 0–11)
CARDIOLIPIN IGG SER IA-ACNC: <9 GPL U/ML (ref 0–14)
CARDIOLIPIN IGM SER IA-ACNC: <9 MPL U/ML (ref 0–12)
ENA SS-A AB SER-ACNC: <0.2 AI (ref 0–0.9)
ENA SS-B AB SER-ACNC: <0.2 AI (ref 0–0.9)
ENDOMYSIUM IGA SER QL: NEGATIVE
IGA SERPL-MCNC: 253 MG/DL (ref 87–352)
TTG IGA SER-ACNC: <2 U/ML (ref 0–3)

## 2020-02-07 LAB
CCP IGA+IGG SERPL IA-ACNC: 8 UNITS (ref 0–19)
LA NT DPL PPP: 37.4 SEC (ref 0–55)
LA NT DPL/LA NT HPL PPP-RTO: 1.08 RATIO (ref 0–1.4)
LA NT PLATELET PPP: 29.5 SEC (ref 0–51.9)
LUPUS ANTICOAGULANT REFLEX: NORMAL
SCREEN DRVVT: 29.3 SEC (ref 0–47)
THROMBIN TIME: 18.5 SEC (ref 0–23)

## 2020-02-12 LAB — HLA-B27 QL NAA+PROBE: NEGATIVE

## 2020-02-13 LAB
RF IGA SER-ACNC: 2.2 EU/ML
RF IGG SER-ACNC: 9.2 EU/ML
RF IGM SER-ACNC: 1.9 IU/ML

## 2020-02-14 ENCOUNTER — TRANSCRIBE ORDERS (OUTPATIENT)
Dept: PHYSICAL THERAPY | Facility: HOSPITAL | Age: 60
End: 2020-02-14

## 2020-02-14 DIAGNOSIS — M25.511 PAIN IN JOINT OF RIGHT SHOULDER: Primary | ICD-10-CM

## 2020-02-25 ENCOUNTER — HOSPITAL ENCOUNTER (OUTPATIENT)
Dept: PHYSICAL THERAPY | Facility: HOSPITAL | Age: 60
Setting detail: THERAPIES SERIES
Discharge: HOME | End: 2020-02-25

## 2020-02-25 DIAGNOSIS — M25.511 PAIN IN JOINT OF RIGHT SHOULDER: ICD-10-CM

## 2020-02-25 DIAGNOSIS — M25.511 RIGHT SHOULDER PAIN, UNSPECIFIED CHRONICITY: Primary | ICD-10-CM

## 2020-02-25 PROCEDURE — 97161 PT EVAL LOW COMPLEX 20 MIN: CPT
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
## 2020-02-26 ENCOUNTER — LAB (OUTPATIENT)
Dept: LAB | Facility: HOSPITAL | Age: 60
End: 2020-02-26

## 2020-02-26 DIAGNOSIS — M25.50 PAIN IN JOINT, MULTIPLE SITES: Primary | ICD-10-CM

## 2020-02-26 DIAGNOSIS — R53.83 OTHER FATIGUE: ICD-10-CM

## 2020-02-26 LAB — PTH-INTACT SERPL-MCNC: 32.2 PG/ML (ref 15–65)

## 2020-02-26 PROCEDURE — 83970 ASSAY OF PARATHORMONE: CPT

## 2020-02-26 PROCEDURE — 86038 ANTINUCLEAR ANTIBODIES: CPT

## 2020-02-26 PROCEDURE — 36415 COLL VENOUS BLD VENIPUNCTURE: CPT

## 2020-02-26 PROCEDURE — 82523 COLLAGEN CROSSLINKS: CPT

## 2020-02-26 PROCEDURE — 83937 ASSAY OF OSTEOCALCIN: CPT

## 2020-02-27 LAB
ALBUMIN SERPL-MCNC: NORMAL G/DL
ALPHA1 GLOB FLD ELPH-MCNC: NORMAL G/L
ALPHA2 GLOB SERPL ELPH-MCNC: NORMAL G/DL
B-GLOBULIN SERPL ELPH-MCNC: NORMAL G/DL
GAMMA GLOB SERPL ELPH-MCNC: NORMAL G/DL
PROT SERPL-MCNC: NORMAL G/DL
SPECIMEN STATUS: NORMAL

## 2020-02-28 LAB
COLLAGEN NTX SER-SCNC: 7 NMOL BCE/L (ref 6.2–19)
OSTEOCALCIN SERPL-MCNC: 9.6 NG/ML

## 2020-02-29 LAB
ANA SER QL IA: NEGATIVE
Lab: NORMAL

## 2020-03-02 ENCOUNTER — LAB (OUTPATIENT)
Dept: LAB | Facility: HOSPITAL | Age: 60
End: 2020-03-02

## 2020-03-02 DIAGNOSIS — R53.83 FATIGUE, UNSPECIFIED TYPE: Primary | ICD-10-CM

## 2020-03-02 PROCEDURE — 36415 COLL VENOUS BLD VENIPUNCTURE: CPT

## 2020-03-02 PROCEDURE — 84165 PROTEIN E-PHORESIS SERUM: CPT

## 2020-03-02 PROCEDURE — 84155 ASSAY OF PROTEIN SERUM: CPT

## 2020-03-03 LAB
ALBUMIN SERPL-MCNC: 3.8 G/DL (ref 2.9–4.4)
ALBUMIN/GLOB SERPL: 1.3 {RATIO} (ref 0.7–1.7)
ALPHA1 GLOB FLD ELPH-MCNC: 0.3 G/DL (ref 0–0.4)
ALPHA2 GLOB SERPL ELPH-MCNC: 0.8 G/DL (ref 0.4–1)
B-GLOBULIN SERPL ELPH-MCNC: 1.2 G/DL (ref 0.7–1.3)
GAMMA GLOB SERPL ELPH-MCNC: 0.7 G/DL (ref 0.4–1.8)
GLOBULIN SER CALC-MCNC: 3 G/DL (ref 2.2–3.9)
Lab: NORMAL
M-SPIKE: NORMAL G/DL
PROT SERPL-MCNC: 6.8 G/DL (ref 6–8.5)

## 2020-03-26 ENCOUNTER — DOCUMENTATION (OUTPATIENT)
Dept: PHYSICAL THERAPY | Facility: HOSPITAL | Age: 60
End: 2020-03-26

## 2020-03-26 DIAGNOSIS — M25.511 PAIN IN JOINT OF RIGHT SHOULDER: Primary | ICD-10-CM

## 2020-03-26 DIAGNOSIS — M25.511 RIGHT SHOULDER PAIN, UNSPECIFIED CHRONICITY: ICD-10-CM

## 2020-03-26 NOTE — THERAPY DISCHARGE NOTE
Outpatient Physical Therapy Discharge Summary         Patient Name: Antonieta Howe  : 1960  MRN: [id number]    Today's Date: 3/26/2020    Visit Dx:    ICD-10-CM ICD-9-CM   1. Pain in joint of right shoulder M25.511 719.41   2. Right shoulder pain, unspecified chronicity M25.511 719.41       PT OP Goals     Row Name 20 1405          PT Short Term Goals    STG Date to Achieve  20  -AC     STG 1  Pt will be independent with HEP to improve strength/postural endurance and reduce pain with use of UEs.  -AC     STG 1 Progress  Met  -AC        Time Calculation    PT Goal Re-Cert Due Date  20  -       User Key  (r) = Recorded By, (t) = Taken By, (c) = Cosigned By    Initials Name Provider Type    AC Parisa Wellington, PT Physical Therapist        OP PT Discharge Summary  Date of Discharge: 20  Reason for Discharge: Maximum functional potential achieved  Outcomes Achieved: Able to achieve all goals within established timeline  Discharge Destination: Home with home program  Discharge Instructions/Additional Comments: Pt seen for IE with minimal restrictions/pain noted. Pt provided HEP and instructed to return within 30 days if any issues. D/c due to lapse in treatment.     Time Calculation:   Start Time:     Parisa Wellington PT  3/26/2020

## 2020-10-09 ENCOUNTER — LAB (OUTPATIENT)
Dept: LAB | Facility: HOSPITAL | Age: 60
End: 2020-10-09

## 2020-10-09 ENCOUNTER — TRANSCRIBE ORDERS (OUTPATIENT)
Dept: LAB | Facility: HOSPITAL | Age: 60
End: 2020-10-09

## 2020-10-09 DIAGNOSIS — M25.50 PAIN IN JOINT, MULTIPLE SITES: ICD-10-CM

## 2020-10-09 DIAGNOSIS — E55.9 AVITAMINOSIS D: ICD-10-CM

## 2020-10-09 DIAGNOSIS — B99.9 DILATED CARDIOMYOPATHY SECONDARY TO INFECTION (HCC): ICD-10-CM

## 2020-10-09 DIAGNOSIS — M81.0 SENILE OSTEOPOROSIS: ICD-10-CM

## 2020-10-09 DIAGNOSIS — B99.9 DILATED CARDIOMYOPATHY SECONDARY TO INFECTION (HCC): Primary | ICD-10-CM

## 2020-10-09 DIAGNOSIS — I43 DILATED CARDIOMYOPATHY SECONDARY TO INFECTION (HCC): Primary | ICD-10-CM

## 2020-10-09 DIAGNOSIS — I43 DILATED CARDIOMYOPATHY SECONDARY TO INFECTION (HCC): ICD-10-CM

## 2020-10-09 LAB
ALBUMIN SERPL-MCNC: 4.4 G/DL (ref 3.5–5.2)
ALBUMIN/GLOB SERPL: 1.6 G/DL
ALP SERPL-CCNC: 93 U/L (ref 39–117)
ALT SERPL W P-5'-P-CCNC: 20 U/L (ref 1–33)
ANION GAP SERPL CALCULATED.3IONS-SCNC: 11.9 MMOL/L (ref 5–15)
AST SERPL-CCNC: 21 U/L (ref 1–32)
BASOPHILS # BLD AUTO: 0.03 10*3/MM3 (ref 0–0.2)
BASOPHILS NFR BLD AUTO: 0.4 % (ref 0–1.5)
BILIRUB SERPL-MCNC: 0.5 MG/DL (ref 0–1.2)
BUN SERPL-MCNC: 17 MG/DL (ref 6–20)
BUN/CREAT SERPL: 23.3 (ref 7–25)
CALCIUM SPEC-SCNC: 9.1 MG/DL (ref 8.6–10.5)
CHLORIDE SERPL-SCNC: 102 MMOL/L (ref 98–107)
CO2 SERPL-SCNC: 24.1 MMOL/L (ref 22–29)
CREAT SERPL-MCNC: 0.73 MG/DL (ref 0.57–1)
DEPRECATED RDW RBC AUTO: 43.5 FL (ref 37–54)
EOSINOPHIL # BLD AUTO: 0.09 10*3/MM3 (ref 0–0.4)
EOSINOPHIL NFR BLD AUTO: 1.3 % (ref 0.3–6.2)
ERYTHROCYTE [DISTWIDTH] IN BLOOD BY AUTOMATED COUNT: 13.3 % (ref 12.3–15.4)
GFR SERPL CREATININE-BSD FRML MDRD: 82 ML/MIN/1.73
GLOBULIN UR ELPH-MCNC: 2.7 GM/DL
GLUCOSE SERPL-MCNC: 101 MG/DL (ref 65–99)
HCT VFR BLD AUTO: 38.2 % (ref 34–46.6)
HGB BLD-MCNC: 12.9 G/DL (ref 12–15.9)
IMM GRANULOCYTES # BLD AUTO: 0.03 10*3/MM3 (ref 0–0.05)
IMM GRANULOCYTES NFR BLD AUTO: 0.4 % (ref 0–0.5)
LYMPHOCYTES # BLD AUTO: 3.03 10*3/MM3 (ref 0.7–3.1)
LYMPHOCYTES NFR BLD AUTO: 43.5 % (ref 19.6–45.3)
MCH RBC QN AUTO: 30.1 PG (ref 26.6–33)
MCHC RBC AUTO-ENTMCNC: 33.8 G/DL (ref 31.5–35.7)
MCV RBC AUTO: 89.3 FL (ref 79–97)
MONOCYTES # BLD AUTO: 0.6 10*3/MM3 (ref 0.1–0.9)
MONOCYTES NFR BLD AUTO: 8.6 % (ref 5–12)
NEUTROPHILS NFR BLD AUTO: 3.19 10*3/MM3 (ref 1.7–7)
NEUTROPHILS NFR BLD AUTO: 45.8 % (ref 42.7–76)
NRBC BLD AUTO-RTO: 0 /100 WBC (ref 0–0.2)
PLATELET # BLD AUTO: 410 10*3/MM3 (ref 140–450)
PMV BLD AUTO: 8.9 FL (ref 6–12)
POTASSIUM SERPL-SCNC: 4.3 MMOL/L (ref 3.5–5.2)
PROT SERPL-MCNC: 7.1 G/DL (ref 6–8.5)
RBC # BLD AUTO: 4.28 10*6/MM3 (ref 3.77–5.28)
SODIUM SERPL-SCNC: 138 MMOL/L (ref 136–145)
WBC # BLD AUTO: 6.97 10*3/MM3 (ref 3.4–10.8)

## 2020-10-09 PROCEDURE — 36415 COLL VENOUS BLD VENIPUNCTURE: CPT

## 2020-10-09 PROCEDURE — 85025 COMPLETE CBC W/AUTO DIFF WBC: CPT

## 2020-10-09 PROCEDURE — 80053 COMPREHEN METABOLIC PANEL: CPT

## 2020-11-18 ENCOUNTER — TRANSCRIBE ORDERS (OUTPATIENT)
Dept: ADMINISTRATIVE | Facility: HOSPITAL | Age: 60
End: 2020-11-18

## 2020-11-18 DIAGNOSIS — R07.89 TIGHTNESS IN CHEST: ICD-10-CM

## 2020-11-18 DIAGNOSIS — R05.9 COUGH: Primary | ICD-10-CM

## 2020-11-20 ENCOUNTER — HOSPITAL ENCOUNTER (OUTPATIENT)
Dept: GENERAL RADIOLOGY | Facility: HOSPITAL | Age: 60
Discharge: HOME | End: 2020-11-20
Admitting: INTERNAL MEDICINE

## 2020-11-20 ENCOUNTER — TRANSCRIBE ORDERS (OUTPATIENT)
Dept: ADMINISTRATIVE | Facility: HOSPITAL | Age: 60
End: 2020-11-20

## 2020-11-20 ENCOUNTER — APPOINTMENT (OUTPATIENT)
Dept: PREADMISSION TESTING | Facility: HOSPITAL | Age: 60
End: 2020-11-20

## 2020-11-20 DIAGNOSIS — R05.9 COUGH: ICD-10-CM

## 2020-11-20 DIAGNOSIS — R05.9 COUGH: Primary | ICD-10-CM

## 2020-11-20 PROCEDURE — 71046 X-RAY EXAM CHEST 2 VIEWS: CPT

## 2020-11-20 PROCEDURE — U0004 COV-19 TEST NON-CDC HGH THRU: HCPCS

## 2020-11-20 PROCEDURE — C9803 HOPD COVID-19 SPEC COLLECT: HCPCS

## 2020-11-21 LAB — SARS-COV-2 RNA RESP QL NAA+PROBE: NOT DETECTED

## 2020-11-23 ENCOUNTER — HOSPITAL ENCOUNTER (OUTPATIENT)
Dept: PULMONOLOGY | Facility: HOSPITAL | Age: 60
Discharge: HOME | End: 2020-11-23
Admitting: INTERNAL MEDICINE

## 2020-11-23 DIAGNOSIS — R05.9 COUGH: ICD-10-CM

## 2020-11-23 DIAGNOSIS — R07.89 TIGHTNESS IN CHEST: ICD-10-CM

## 2020-11-23 PROCEDURE — 94729 DIFFUSING CAPACITY: CPT

## 2020-11-23 PROCEDURE — 94729 DIFFUSING CAPACITY: CPT | Performed by: INTERNAL MEDICINE

## 2020-11-23 PROCEDURE — 94010 BREATHING CAPACITY TEST: CPT | Performed by: INTERNAL MEDICINE

## 2020-11-23 PROCEDURE — 94010 BREATHING CAPACITY TEST: CPT

## 2020-11-23 PROCEDURE — 94727 GAS DIL/WSHOT DETER LNG VOL: CPT

## 2020-11-23 PROCEDURE — 94727 GAS DIL/WSHOT DETER LNG VOL: CPT | Performed by: INTERNAL MEDICINE

## 2021-01-05 ENCOUNTER — IMMUNIZATION (OUTPATIENT)
Dept: VACCINE CLINIC | Facility: HOSPITAL | Age: 61
End: 2021-01-05

## 2021-01-05 PROCEDURE — 91300 HC SARSCOV02 VAC 30MCG/0.3ML IM: CPT | Performed by: INTERNAL MEDICINE

## 2021-01-05 PROCEDURE — 0001A: CPT | Performed by: INTERNAL MEDICINE

## 2021-01-26 ENCOUNTER — IMMUNIZATION (OUTPATIENT)
Dept: VACCINE CLINIC | Facility: HOSPITAL | Age: 61
End: 2021-01-26

## 2021-01-26 PROCEDURE — 0002A: CPT | Performed by: INTERNAL MEDICINE

## 2021-01-26 PROCEDURE — 91300 HC SARSCOV02 VAC 30MCG/0.3ML IM: CPT | Performed by: INTERNAL MEDICINE

## 2021-03-02 ENCOUNTER — TRANSCRIBE ORDERS (OUTPATIENT)
Dept: LAB | Facility: HOSPITAL | Age: 61
End: 2021-03-02

## 2021-03-02 ENCOUNTER — LAB (OUTPATIENT)
Dept: LAB | Facility: HOSPITAL | Age: 61
End: 2021-03-02

## 2021-03-02 DIAGNOSIS — E55.9 AVITAMINOSIS D: ICD-10-CM

## 2021-03-02 DIAGNOSIS — K50.90 CROHN'S DISEASE WITHOUT COMPLICATION, UNSPECIFIED GASTROINTESTINAL TRACT LOCATION (HCC): ICD-10-CM

## 2021-03-02 DIAGNOSIS — E55.9 AVITAMINOSIS D: Primary | ICD-10-CM

## 2021-03-02 DIAGNOSIS — M81.0 SENILE OSTEOPOROSIS: ICD-10-CM

## 2021-03-02 LAB
25(OH)D3 SERPL-MCNC: 42.1 NG/ML (ref 30–100)
ALBUMIN SERPL-MCNC: 3.9 G/DL (ref 3.5–5.2)
ALBUMIN/GLOB SERPL: 1.4 G/DL
ALP SERPL-CCNC: 92 U/L (ref 39–117)
ALT SERPL W P-5'-P-CCNC: 19 U/L (ref 1–33)
ANION GAP SERPL CALCULATED.3IONS-SCNC: 8.8 MMOL/L (ref 5–15)
AST SERPL-CCNC: 20 U/L (ref 1–32)
BASOPHILS # BLD AUTO: 0.03 10*3/MM3 (ref 0–0.2)
BASOPHILS NFR BLD AUTO: 0.5 % (ref 0–1.5)
BILIRUB SERPL-MCNC: 0.4 MG/DL (ref 0–1.2)
BUN SERPL-MCNC: 17 MG/DL (ref 6–20)
BUN/CREAT SERPL: 29.8 (ref 7–25)
CALCIUM SPEC-SCNC: 9.3 MG/DL (ref 8.6–10.5)
CHLORIDE SERPL-SCNC: 104 MMOL/L (ref 98–107)
CO2 SERPL-SCNC: 26.2 MMOL/L (ref 22–29)
CREAT SERPL-MCNC: 0.57 MG/DL (ref 0.57–1)
DEPRECATED RDW RBC AUTO: 42.3 FL (ref 37–54)
EOSINOPHIL # BLD AUTO: 0.15 10*3/MM3 (ref 0–0.4)
EOSINOPHIL NFR BLD AUTO: 2.6 % (ref 0.3–6.2)
ERYTHROCYTE [DISTWIDTH] IN BLOOD BY AUTOMATED COUNT: 12.8 % (ref 12.3–15.4)
GFR SERPL CREATININE-BSD FRML MDRD: 109 ML/MIN/1.73
GLOBULIN UR ELPH-MCNC: 2.7 GM/DL
GLUCOSE SERPL-MCNC: 91 MG/DL (ref 65–99)
HCT VFR BLD AUTO: 37.7 % (ref 34–46.6)
HGB BLD-MCNC: 12 G/DL (ref 12–15.9)
IMM GRANULOCYTES # BLD AUTO: 0.01 10*3/MM3 (ref 0–0.05)
IMM GRANULOCYTES NFR BLD AUTO: 0.2 % (ref 0–0.5)
LYMPHOCYTES # BLD AUTO: 2.14 10*3/MM3 (ref 0.7–3.1)
LYMPHOCYTES NFR BLD AUTO: 37 % (ref 19.6–45.3)
MCH RBC QN AUTO: 28.7 PG (ref 26.6–33)
MCHC RBC AUTO-ENTMCNC: 31.8 G/DL (ref 31.5–35.7)
MCV RBC AUTO: 90.2 FL (ref 79–97)
MONOCYTES # BLD AUTO: 0.66 10*3/MM3 (ref 0.1–0.9)
MONOCYTES NFR BLD AUTO: 11.4 % (ref 5–12)
NEUTROPHILS NFR BLD AUTO: 2.79 10*3/MM3 (ref 1.7–7)
NEUTROPHILS NFR BLD AUTO: 48.3 % (ref 42.7–76)
NRBC BLD AUTO-RTO: 0 /100 WBC (ref 0–0.2)
PLATELET # BLD AUTO: 394 10*3/MM3 (ref 140–450)
PMV BLD AUTO: 9.1 FL (ref 6–12)
POTASSIUM SERPL-SCNC: 4.3 MMOL/L (ref 3.5–5.2)
PROT SERPL-MCNC: 6.6 G/DL (ref 6–8.5)
RBC # BLD AUTO: 4.18 10*6/MM3 (ref 3.77–5.28)
SODIUM SERPL-SCNC: 139 MMOL/L (ref 136–145)
WBC # BLD AUTO: 5.78 10*3/MM3 (ref 3.4–10.8)

## 2021-03-02 PROCEDURE — 80053 COMPREHEN METABOLIC PANEL: CPT

## 2021-03-02 PROCEDURE — 36415 COLL VENOUS BLD VENIPUNCTURE: CPT

## 2021-03-02 PROCEDURE — 85025 COMPLETE CBC W/AUTO DIFF WBC: CPT

## 2021-03-02 PROCEDURE — 82306 VITAMIN D 25 HYDROXY: CPT

## 2021-10-15 ENCOUNTER — IMMUNIZATION (OUTPATIENT)
Dept: VACCINE CLINIC | Facility: HOSPITAL | Age: 61
End: 2021-10-15

## 2021-10-15 PROCEDURE — 91300 HC SARSCOV02 VAC 30MCG/0.3ML IM: CPT | Performed by: INTERNAL MEDICINE

## 2021-10-15 PROCEDURE — 0004A ADM SARSCOV2 30MCG/0.3ML BOOSTER: CPT | Performed by: INTERNAL MEDICINE

## 2021-11-22 ENCOUNTER — TRANSCRIBE ORDERS (OUTPATIENT)
Dept: LAB | Facility: HOSPITAL | Age: 61
End: 2021-11-22

## 2021-11-22 ENCOUNTER — TRANSCRIBE ORDERS (OUTPATIENT)
Dept: ADMINISTRATIVE | Facility: HOSPITAL | Age: 61
End: 2021-11-22

## 2021-11-22 ENCOUNTER — LAB (OUTPATIENT)
Dept: LAB | Facility: HOSPITAL | Age: 61
End: 2021-11-22

## 2021-11-22 DIAGNOSIS — M50.10 CERVICAL DISC DISORDER WITH RADICULOPATHY, UNSPECIFIED CERVICAL REGION: Primary | ICD-10-CM

## 2021-11-22 DIAGNOSIS — E78.00 PURE HYPERCHOLESTEROLEMIA: ICD-10-CM

## 2021-11-22 DIAGNOSIS — E78.00 PURE HYPERCHOLESTEROLEMIA: Primary | ICD-10-CM

## 2021-11-22 DIAGNOSIS — M81.0 AGE-RELATED OSTEOPOROSIS WITHOUT CURRENT PATHOLOGICAL FRACTURE: Primary | ICD-10-CM

## 2021-11-22 LAB
25(OH)D3 SERPL-MCNC: 45.4 NG/ML (ref 30–100)
ALBUMIN SERPL-MCNC: 4.4 G/DL (ref 3.5–5.2)
ALBUMIN/GLOB SERPL: 1.8 G/DL
ALP SERPL-CCNC: 96 U/L (ref 39–117)
ALT SERPL W P-5'-P-CCNC: 22 U/L (ref 1–33)
ANION GAP SERPL CALCULATED.3IONS-SCNC: 10.3 MMOL/L (ref 5–15)
AST SERPL-CCNC: 16 U/L (ref 1–32)
BASOPHILS # BLD AUTO: 0.02 10*3/MM3 (ref 0–0.2)
BASOPHILS NFR BLD AUTO: 0.3 % (ref 0–1.5)
BILIRUB SERPL-MCNC: 0.3 MG/DL (ref 0–1.2)
BUN SERPL-MCNC: 12 MG/DL (ref 6–20)
BUN/CREAT SERPL: 18.8 (ref 7–25)
CALCIUM SPEC-SCNC: 9 MG/DL (ref 8.6–10.5)
CHLORIDE SERPL-SCNC: 104 MMOL/L (ref 98–107)
CO2 SERPL-SCNC: 25.7 MMOL/L (ref 22–29)
CREAT SERPL-MCNC: 0.64 MG/DL (ref 0.57–1)
DEPRECATED RDW RBC AUTO: 44.8 FL (ref 37–54)
EOSINOPHIL # BLD AUTO: 0.23 10*3/MM3 (ref 0–0.4)
EOSINOPHIL NFR BLD AUTO: 3.9 % (ref 0.3–6.2)
ERYTHROCYTE [DISTWIDTH] IN BLOOD BY AUTOMATED COUNT: 13.9 % (ref 12.3–15.4)
GFR SERPL CREATININE-BSD FRML MDRD: 95 ML/MIN/1.73
GLOBULIN UR ELPH-MCNC: 2.4 GM/DL
GLUCOSE SERPL-MCNC: 98 MG/DL (ref 65–99)
HCT VFR BLD AUTO: 36.4 % (ref 34–46.6)
HGB BLD-MCNC: 11.8 G/DL (ref 12–15.9)
IMM GRANULOCYTES # BLD AUTO: 0.01 10*3/MM3 (ref 0–0.05)
IMM GRANULOCYTES NFR BLD AUTO: 0.2 % (ref 0–0.5)
LYMPHOCYTES # BLD AUTO: 2.48 10*3/MM3 (ref 0.7–3.1)
LYMPHOCYTES NFR BLD AUTO: 41.5 % (ref 19.6–45.3)
MCH RBC QN AUTO: 28.7 PG (ref 26.6–33)
MCHC RBC AUTO-ENTMCNC: 32.4 G/DL (ref 31.5–35.7)
MCV RBC AUTO: 88.6 FL (ref 79–97)
MONOCYTES # BLD AUTO: 0.94 10*3/MM3 (ref 0.1–0.9)
MONOCYTES NFR BLD AUTO: 15.7 % (ref 5–12)
NEUTROPHILS NFR BLD AUTO: 2.29 10*3/MM3 (ref 1.7–7)
NEUTROPHILS NFR BLD AUTO: 38.4 % (ref 42.7–76)
NRBC BLD AUTO-RTO: 0 /100 WBC (ref 0–0.2)
PLATELET # BLD AUTO: 363 10*3/MM3 (ref 140–450)
PMV BLD AUTO: 8.7 FL (ref 6–12)
POTASSIUM SERPL-SCNC: 4.4 MMOL/L (ref 3.5–5.2)
PROT SERPL-MCNC: 6.8 G/DL (ref 6–8.5)
RBC # BLD AUTO: 4.11 10*6/MM3 (ref 3.77–5.28)
SODIUM SERPL-SCNC: 140 MMOL/L (ref 136–145)
WBC NRBC COR # BLD: 5.97 10*3/MM3 (ref 3.4–10.8)

## 2021-11-22 PROCEDURE — 82306 VITAMIN D 25 HYDROXY: CPT

## 2021-11-22 PROCEDURE — 36415 COLL VENOUS BLD VENIPUNCTURE: CPT

## 2021-11-22 PROCEDURE — 83704 LIPOPROTEIN BLD QUAN PART: CPT

## 2021-11-22 PROCEDURE — 80053 COMPREHEN METABOLIC PANEL: CPT

## 2021-11-22 PROCEDURE — 85025 COMPLETE CBC W/AUTO DIFF WBC: CPT

## 2021-11-22 PROCEDURE — 80061 LIPID PANEL: CPT

## 2021-11-24 LAB
CHOLEST SERPL-MCNC: 190 MG/DL (ref 100–199)
HDL SERPL-SCNC: 35 UMOL/L
HDLC SERPL-MCNC: 61 MG/DL
LDL SERPL QN: 20.8 NM
LDL SERPL-SCNC: 1244 NMOL/L
LDL SMALL SERPL-SCNC: 518 NMOL/L
LDLC SERPL CALC-MCNC: 99 MG/DL (ref 0–99)
TRIGL SERPL-MCNC: 178 MG/DL (ref 0–149)

## 2021-12-07 ENCOUNTER — TRANSCRIBE ORDERS (OUTPATIENT)
Dept: ADMINISTRATIVE | Facility: HOSPITAL | Age: 61
End: 2021-12-07

## 2021-12-07 DIAGNOSIS — R05.9 COUGH, UNSPECIFIED: Primary | ICD-10-CM

## 2021-12-07 DIAGNOSIS — Z12.31 VISIT FOR SCREENING MAMMOGRAM: ICD-10-CM

## 2021-12-07 DIAGNOSIS — Z11.59 SCREENING FOR VIRAL DISEASE: ICD-10-CM

## 2021-12-15 ENCOUNTER — HOSPITAL ENCOUNTER (OUTPATIENT)
Dept: MRI IMAGING | Facility: HOSPITAL | Age: 61
Discharge: HOME | End: 2021-12-15

## 2021-12-15 DIAGNOSIS — M50.10 CERVICAL DISC DISORDER WITH RADICULOPATHY, UNSPECIFIED CERVICAL REGION: ICD-10-CM

## 2021-12-15 PROCEDURE — 72141 MRI NECK SPINE W/O DYE: CPT

## 2021-12-31 ENCOUNTER — LAB (OUTPATIENT)
Dept: PREADMISSION TESTING | Facility: HOSPITAL | Age: 61
End: 2021-12-31

## 2021-12-31 PROCEDURE — U0004 COV-19 TEST NON-CDC HGH THRU: HCPCS

## 2021-12-31 PROCEDURE — C9803 HOPD COVID-19 SPEC COLLECT: HCPCS

## 2022-01-01 LAB — SARS-COV-2 RNA PNL SPEC NAA+PROBE: NOT DETECTED

## 2022-01-03 ENCOUNTER — HOSPITAL ENCOUNTER (OUTPATIENT)
Dept: GENERAL RADIOLOGY | Facility: HOSPITAL | Age: 62
Discharge: HOME | End: 2022-01-03
Admitting: INTERNAL MEDICINE

## 2022-01-03 DIAGNOSIS — R13.10 DYSPHAGIA, UNSPECIFIED TYPE: Primary | ICD-10-CM

## 2022-01-03 DIAGNOSIS — R05.9 COUGH, UNSPECIFIED: ICD-10-CM

## 2022-01-03 PROCEDURE — 92611 MOTION FLUOROSCOPY/SWALLOW: CPT

## 2022-01-03 PROCEDURE — 74230 X-RAY XM SWLNG FUNCJ C+: CPT

## 2022-01-03 RX ADMIN — BARIUM SULFATE 20 ML: 400 PASTE ORAL at 13:37

## 2022-01-03 RX ADMIN — BARIUM SULFATE 100 ML: 0.81 POWDER, FOR SUSPENSION ORAL at 13:37

## 2022-01-03 NOTE — MBS/VFSS/FEES
Outpatient Speech Language Pathology   Adult Swallow Initial Evaluation   [facility]   Modified Barium Swallow Study (MBS)     Patient Name: Antonieta Howe  : 1960  MRN: [id number]  Today's Date: 1/3/2022         Visit Date: 2022   Patient Active Problem List   Diagnosis   • Foot pain, bilateral   • Venous stasis   • Infarction of spleen   • Osteoporosis   • Diverticulitis   • Mixed hyperlipidemia   • Gastroesophageal reflux disease   • Mitral valve prolapse   • Asthma   • Enteritis   • Acquired asplenia        Past Medical History:   Diagnosis Date   • Achilles tendonitis    • Esophagitis    • Gastrointestinal disorder    • Hepatitis    • Hyperlipidemia    • Osteoarthritis    • Osteoporosis    • Plantar fasciitis    • Skin disorder    • Splenic infarct         Past Surgical History:   Procedure Laterality Date   •  SECTION     • CHOLECYSTECTOMY     • HYSTERECTOMY     • LAPAROSCOPIC TUBAL LIGATION     • LAPAROSCOPY DIAGNOSTIC / BIOPSY / ASPIRATION / LYSIS     • SPLENECTOMY           Visit Dx:     ICD-10-CM ICD-9-CM   1. Dysphagia, unspecified type  R13.10 787.20   2. Cough, unspecified  R05.9 786.2                SLP Adult Swallow Evaluation     Row Name 22 1315       Rehab Evaluation    Document Type evaluation  -RD    Subjective Information no complaints  -RD    Patient Observations alert; cooperative; agree to therapy  -RD    Patient/Family/Caregiver Comments/Observations no family present  -RD    Patient Effort excellent  -RD       General Information    Patient Profile Reviewed yes  -RD    Pertinent History Of Current Problem Pt presents for OP MBS 2' pt reports of occasional choking episodes. Pt reports choking, typically on secretions, & not necessarily associated w/ eating or drinking per pt report. PMH significant for splenic infarct, osteoporosis, cervical disc d/o, GERD, MV prolapse, & laryngospasms. Pt also reports issues w/ GERD that she manages w/ meds & lifestyle  modifications.  -RD    Current Method of Nutrition regular textures; thin liquids  -RD    Precautions/Limitations, Vision WFL with corrective lenses; for purposes of eval  -RD    Precautions/Limitations, Hearing WFL; for purposes of eval  -RD    Prior Level of Function-Communication WFL  -RD    Prior Level of Function-Swallowing no diet consistency restrictions; esophageal concerns  -RD    Plans/Goals Discussed with patient; agreed upon  -RD    Barriers to Rehab none identified  -RD    Patient's Goals for Discharge eat/drink without coughing/choking  -RD       Pain    Additional Documentation Pain Scale: Numbers Pre/Post-Treatment (Group)  -RD       Pain Scale: Numbers Pre/Post-Treatment    Pretreatment Pain Rating 0/10 - no pain  -RD    Posttreatment Pain Rating 0/10 - no pain  -RD       MBS/VFSS    Utensils Used spoon; cup; straw  -RD    Consistencies Trialed thin liquids; pudding thick; regular textures  -RD       MBS/VFSS Interpretation    Oral Phase, Comment Functional oral phase of the swallow. Adequate mastication of regular solids. Oral prep & transit were WFL. No significant oral residue or weakness identified.  -RD       Initiation of Pharyngeal Swallow    Initiation of Pharyngeal Swallow bolus in pyriform sinuses  -RD    Pharyngeal Phase functional pharyngeal phase of swallowing  -RD    Rosenbek's Scale thin:; pudding/puree:; regular textures:; 1--->level 1  -RD    Pharyngeal Phase, Comment Functional pharyngeal phase of the swallow. Swallow initiation was to the pyriform sinuses. No penetration or aspiration w/ any trial or consistency. Minimal vallecular residue w/ all consistencies which cleared w/ cued additional swallow. No other significant pharyngeal residue or pharyngeal weakness identified.  -RD       Esophageal Phase    Esophageal Phase see radiology report for further details; other (see comments)  -RD    Esophageal Phase, Comment pt reports hx of GERD, reviewed lifestyle modifcations & reflux  precautions. Suspect reflux contributing to pt's current complaints.  -RD       SLP Evaluation Clinical Impression    SLP Swallowing Diagnosis functional oral phase; functional pharyngeal phase; other (see comments)  suspect esophageal dysphagia  -RD    Functional Impact no impact on function  -RD    Rehab Potential/Prognosis, Swallowing good, to achieve stated therapy goals  -RD    Swallow Criteria for Skilled Therapeutic Interventions Met no problems identified which require skilled intervention  -RD       Recommendations    Therapy Frequency (Swallow) evaluation only  -RD    SLP Diet Recommendation regular textures; thin liquids  -RD    Recommended Precautions and Strategies upright posture during/after eating; general aspiration precautions; reflux precautions  -RD    Oral Care Recommendations Oral Care BID/PRN  -RD    SLP Rec. for Method of Medication Administration meds whole; with thin liquids; with pudding or applesauce; as tolerated  -RD    Monitor for Signs of Aspiration yes; notify SLP if any concerns  -RD    Anticipated Discharge Disposition (SLP) home  -RD    Demonstrates Need for Referral to Another Service other (see comments)  pt follows w/ GI  -RD          User Key  (r) = Recorded By, (t) = Taken By, (c) = Cosigned By    Initials Name Provider Type    Lea Jay MS CCC-SLP Speech and Language Pathologist                               OP SLP Education     Row Name 01/03/22 1441       Education    Barriers to Learning No barriers identified  -RD    Assessed Learning needs; Learning motivation; Learning preferences; Learning readiness  -RD    Learning Motivation Strong  -RD    Learning Method Explanation; Teach back  -RD    Teaching Response Verbalized understanding  -RD          User Key  (r) = Recorded By, (t) = Taken By, (c) = Cosigned By    Initials Name Effective Dates    Lea Jay MS CCC-SLP 06/16/21 -                          Time Calculation:   SLP Start Time:  1315  Untimed Charges  SLP Eval/Re-eval : ST Motion Fluoro Eval Swallow - 35785  57623-ML Motion Fluoro Eval Swallow Minutes: 53  Total Minutes  Untimed Charges Total Minutes: 53   Total Minutes: 53    Therapy Charges for Today     Code Description Service Date Service Provider Modifiers Qty    87201885004  ST MOTION FLUORO EVAL SWALLOW 4 1/3/2022 Lea Ya, MS CCC-SLP GN 1            Patient was not wearing a face mask and did not exhibit coughing during this therapy encounter.  Procedure performed was aerosolizing, involved close contact (within 6 feet for at least 15 minutes or longer), and did not involve contact with infectious secretions or specimens.  Therapist used appropriate personal protective equipment including gloves, standard procedure mask and eye protection.  Appropriate PPE was worn during the entire therapy session.  Hand hygiene was completed before and after therapy session.         Lea Ya MS CCC-SLP  1/3/2022

## 2022-02-07 ENCOUNTER — CLINICAL SUPPORT NO REQUIREMENTS (OUTPATIENT)
Dept: PULMONOLOGY | Facility: CLINIC | Age: 62
End: 2022-02-07

## 2022-02-07 DIAGNOSIS — Z01.812 BLOOD TESTS PRIOR TO TREATMENT OR PROCEDURE: ICD-10-CM

## 2022-02-07 DIAGNOSIS — Z01.812 BLOOD TESTS PRIOR TO TREATMENT OR PROCEDURE: Primary | ICD-10-CM

## 2022-02-07 PROCEDURE — 99211 OFF/OP EST MAY X REQ PHY/QHP: CPT | Performed by: INTERNAL MEDICINE

## 2022-02-07 PROCEDURE — U0004 COV-19 TEST NON-CDC HGH THRU: HCPCS | Performed by: INTERNAL MEDICINE

## 2022-02-08 LAB — SARS-COV-2 RNA NOSE QL NAA+PROBE: NOT DETECTED

## 2022-02-10 ENCOUNTER — OFFICE VISIT (OUTPATIENT)
Dept: PULMONOLOGY | Facility: CLINIC | Age: 62
End: 2022-02-10

## 2022-02-10 VITALS
HEART RATE: 80 BPM | TEMPERATURE: 98.2 F | HEIGHT: 62 IN | BODY MASS INDEX: 31.28 KG/M2 | WEIGHT: 170 LBS | DIASTOLIC BLOOD PRESSURE: 80 MMHG | SYSTOLIC BLOOD PRESSURE: 138 MMHG | OXYGEN SATURATION: 97 %

## 2022-02-10 DIAGNOSIS — J40 FREQUENT EPISODES OF BRONCHITIS: ICD-10-CM

## 2022-02-10 DIAGNOSIS — R05.9 COUGH: ICD-10-CM

## 2022-02-10 DIAGNOSIS — R05.9 COUGH: Primary | ICD-10-CM

## 2022-02-10 DIAGNOSIS — Z87.891 FORMER SMOKER: ICD-10-CM

## 2022-02-10 DIAGNOSIS — K21.9 CHRONIC GERD: ICD-10-CM

## 2022-02-10 PROCEDURE — 94726 PLETHYSMOGRAPHY LUNG VOLUMES: CPT | Performed by: INTERNAL MEDICINE

## 2022-02-10 PROCEDURE — 99204 OFFICE O/P NEW MOD 45 MIN: CPT | Performed by: INTERNAL MEDICINE

## 2022-02-10 PROCEDURE — 94729 DIFFUSING CAPACITY: CPT | Performed by: INTERNAL MEDICINE

## 2022-02-10 PROCEDURE — 94010 BREATHING CAPACITY TEST: CPT | Performed by: INTERNAL MEDICINE

## 2022-02-10 NOTE — PROGRESS NOTES
PULMONARY  NOTE    Chief Complaint     Episodic coughing, GERD, hiatal hernia, Crohn's, recurrent bronchitis    History of Present Illness     61-year-old female referred for recurrent respiratory symptoms    She has a minimal smoking history, none since 1999    She has been told in the past that she had asthma  She has been prescribed a variety of inhalers but only uses them when she gets bronchitis    She typically gets bronchitis 2-3 times per year  She usually responds to 1 or 2 courses of antibiotics and steroids.    She has been seen by Dr. Alva, rheumatology and Dr. Tressa Hernandez, allergy/immunology  No specific autoimmune or immune deficiency has been identified    She does have Crohn's disease, however.  She is followed by Dr. Gongora    She does have the head of her bed elevated  She tries to avoid eating or drinking before going to bed at night  At least on a previous EGD she has been told that she had Perez's esophagus  She feels that she aspirates at night  She will have episodic coughing that many times this is associated with hoarseness  This can occur at night as well as at times during the day  She is never had hemoptysis    Patient Active Problem List   Diagnosis   • Foot pain, bilateral   • Venous stasis   • Infarction of spleen   • Osteoporosis   • Diverticulitis   • Mixed hyperlipidemia   • Mitral valve prolapse   • Asthma   • Enteritis   • Acquired asplenia   • Chronic GERD   • Paroxysmal cough   • Frequent episodes of bronchitis   • Remote, minimal smoker     Allergies   Allergen Reactions   • Levaquin [Levofloxacin] Other (See Comments)     Patient states she gets really bad plantar fasciitis   • Bactrim [Sulfamethoxazole-Trimethoprim] Itching and Rash   • Beeswax Rash   • Other Itching and Rash     Kapidex   • Sulfa Antibiotics Rash   • Zosyn [Piperacillin Sod-Tazobactam So] Rash       Current Outpatient Medications:   •  albuterol (PROVENTIL) (2.5 MG/3ML) 0.083% nebulizer solution, Take 2.5  mg by nebulization 4 (Four) Times a Day As Needed., Disp: 90 mL, Rfl: 1  •  albuterol sulfate  (90 Base) MCG/ACT inhaler, Inhale 2 puffs Every 4 (Four) Hours As Needed., Disp: 18 g, Rfl: 5  •  albuterol sulfate  (90 Base) MCG/ACT inhaler, Inhale 2 puffs Every 4 (Four) Hours As Needed., Disp: 18 g, Rfl: 5  •  azelastine (ASTELIN) 0.1 % nasal spray, Instill 1-2 sprays into the nostril(s) as directed by provider 2 (Two) Times a Day., Disp: 120 mL, Rfl: 5  •  azelastine (ASTEPRO) 0.15 % solution nasal spray, 1 spray into the nostril(s) as directed by provider 2 (Two) Times a Day., Disp: 30 mL, Rfl: 10  •  betamethasone, augmented, (DIPROLENE) 0.05 % cream, Apply topically to the appropriate area as directed 2 (Two) Times a Day for 14 days, then decrease to 2-3 times weekly, Disp: 50 g, Rfl: 1  •  Calcium Polycarbophil (FIBER-CAPS PO), Take  by mouth Daily., Disp: , Rfl:   •  clopidogrel (PLAVIX) 75 MG tablet, Take 1 tablet by mouth Daily., Disp: 90 tablet, Rfl: 1  •  clopidogrel (PLAVIX) 75 MG tablet, Take 1 tablet by mouth Daily., Disp: 90 tablet, Rfl: 2  •  Coenzyme Q10 (COQ10) 200 MG capsule, Take 200 mg by mouth Take As Directed., Disp: , Rfl:   •  denosumab (PROLIA) 60 MG/ML solution prefilled syringe syringe, Inject  under the skin into the appropriate area as directed., Disp: , Rfl:   •  esomeprazole (nexIUM) 40 MG capsule, Take 40 mg by mouth Take As Directed., Disp: , Rfl:   •  esomeprazole (nexIUM) 40 MG capsule, Take 1 capsule by mouth 2 (two) times a day., Disp: 180 capsule, Rfl: 2  •  famotidine (PEPCID) 40 MG tablet, Take 1 tablet by mouth 2 (Two) Times a Day., Disp: 180 tablet, Rfl: 3  •  hyoscyamine (ANASPAZ,LEVSIN) 0.125 MG tablet, Take 1 tablet by mouth Every 8 (Eight) Hours As Needed for abdominal cramping, Disp: 30 tablet, Rfl: 3  •  ipratropium-albuterol (Combivent Respimat)  MCG/ACT inhaler, Inhale 1 puff 4 (Four) Times a Day., Disp: 12 g, Rfl: 1  •  mesalamine (Pentasa) 500 MG  "CR capsule, Take 1 capsule by mouth 3 (Three) Times a Day., Disp: 270 capsule, Rfl: 4  •  methocarbamol (ROBAXIN) 500 MG tablet, Take 1 tablet by mouth Daily As Needed for Muscle Spasms., Disp: 30 tablet, Rfl: 0  •  MULTIPLE VITAMIN PO, Take 1 tablet by mouth Take As Directed., Disp: , Rfl:   •  nebivolol (Bystolic) 5 MG tablet, Take 1 tablet by mouth Daily., Disp: 90 tablet, Rfl: 3  •  Probiotic Product (PROBIOTIC ADVANCED PO), Take  by mouth Daily., Disp: , Rfl:   •  risedronate (ACTONEL) 150 MG tablet, Take 1 tablet by mouth Every 30 (Thirty) Days. with water on empty stomach, nothing by mouth or lie down for next 30 minutes., Disp: 3 tablet, Rfl: 1  •  rosuvastatin (CRESTOR) 10 MG tablet, Take 1 tablet by mouth Daily., Disp: 90 tablet, Rfl: 3  •  VITAMIN D, CHOLECALCIFEROL, PO, Take 5,000 Units by mouth Take As Directed., Disp: , Rfl:   •  vitamin D3 125 MCG (5000 UT) capsule capsule, Take  by mouth., Disp: , Rfl:   MEDICATION LIST AND ALLERGIES REVIEWED.    Family History   Problem Relation Age of Onset   • Heart attack Mother    • Hypertension Mother    • Stroke Mother    • Stroke Father    • Heart attack Other    • Heart disease Other    • Stroke Other    • Depression Other    • Hypertension Other    • Breast cancer Neg Hx    • Ovarian cancer Neg Hx      Social History     Tobacco Use   • Smoking status: Former Smoker     Packs/day: 0.50     Years: 10.00     Pack years: 5.00     Types: Cigarettes     Quit date:      Years since quittin.1   • Smokeless tobacco: Never Used   Substance Use Topics   • Alcohol use: No   • Drug use: No     Social History     Social History Narrative    , works as a nurse at  [employer]    Has about a 10-pack-year smoking history, none since     Does not drink alcohol    No vaping     FAMILY AND SOCIAL HISTORY REVIEWED.    Review of Systems  ALSO REFER TO SCANNED ROS SHEET FROM SAME DATE.    /80   Pulse 80   Temp 98.2 °F (36.8 °C)   Ht 157.5 cm (62\")   Wt " 77.1 kg (170 lb)   LMP  (LMP Unknown)   SpO2 97% Comment: RESTING, ROOM AIR  Breastfeeding No   BMI 31.09 kg/m²   Physical Exam  Vitals and nursing note reviewed.   Constitutional:       General: She is not in acute distress.     Appearance: She is well-developed. She is not diaphoretic.   HENT:      Head: Normocephalic and atraumatic.   Neck:      Thyroid: No thyromegaly.   Cardiovascular:      Rate and Rhythm: Normal rate and regular rhythm.      Heart sounds: Normal heart sounds. No murmur heard.      Pulmonary:      Effort: Pulmonary effort is normal.      Breath sounds: Normal breath sounds. No stridor.   Chest:   Breasts:      Right: No supraclavicular adenopathy.      Left: No supraclavicular adenopathy.       Abdominal:      General: Bowel sounds are normal.   Lymphadenopathy:      Cervical: No cervical adenopathy.      Upper Body:      Right upper body: No supraclavicular or epitrochlear adenopathy.      Left upper body: No supraclavicular or epitrochlear adenopathy.   Skin:     General: Skin is warm and dry.   Neurological:      Mental Status: She is alert and oriented to person, place, and time.   Psychiatric:         Behavior: Behavior normal.         Results     PFTs reveal no airway obstruction, no restriction, and a normal diffusion capacity    PFTs from [facility] dated 11/23/2020 revealed no airway obstruction, no restriction, and a normal diffusion capacity    Chest x-ray reveals no effusions, infiltrates, or consolidation  Immunization History   Administered Date(s) Administered   • COVID-19 (PFIZER) PURPLE CAP 01/05/2021, 01/26/2021, 10/15/2021   • Flu Vaccine Quad PF >18YRS 01/20/2012, 09/20/2013, 10/01/2015, 10/01/2017, 10/01/2019, 10/27/2021   • Hepatitis A 02/21/2020, 05/14/2021   • Pneumococcal Polysaccharide (PPSV23) 01/22/2010, 01/22/2010, 02/21/2020   • Shingrix 05/14/2021, 11/19/2021   • Tdap 01/25/2010, 01/25/2010, 02/21/2020     Problem List       ICD-10-CM ICD-9-CM   1. Cough  R05.9  786.2   2. Paroxysmal cough  R05.9 786.2   3. Frequent episodes of bronchitis  J40 490   4. Chronic GERD  K21.9 530.81   5. Remote, minimal smoker  Z87.891 V15.82       Discussion     Her exam, PFTs, and chest x-ray are unremarkable  While 2 sets of normal PFTs do not rule out asthma, I think asthma is an unlikely cause of her symptoms  We did discuss the possibility of doing a methacholine challenge.    I think her symptoms are most likely related to reflux  She has prolific reflux symptoms at times  At times she will wake up with vomit in her mouth, sometimes coming out her nose  She is trying to follow reflux precautions but continues to have regular reflux symptoms    She very well may require repair of her hiatal hernia  While she has been able to recover from her frequent episodes of bronchitis, repeated insults to due to reflux and aspiration could ultimately lead to life-threatening illness.    We discussed reflux precautions in detail  I recommended surgical referral for consideration of hiatal hernia repair or at least further investigation.    I will see her back in a few months for follow-up    Moderate level of Medical Decision Making complexity based on 1 undiagnosed new problem, independent interpretation of tests, and/or prescription drug management     Umang Rainey MD  Note electronically signed    CC: Tavo Valderrama MD

## 2022-02-18 ENCOUNTER — HOSPITAL ENCOUNTER (OUTPATIENT)
Dept: MAMMOGRAPHY | Facility: HOSPITAL | Age: 62
Discharge: HOME | End: 2022-02-18
Admitting: INTERNAL MEDICINE

## 2022-02-18 ENCOUNTER — PATIENT ROUNDING (BHMG ONLY) (OUTPATIENT)
Dept: PULMONOLOGY | Facility: CLINIC | Age: 62
End: 2022-02-18

## 2022-02-18 DIAGNOSIS — Z12.31 VISIT FOR SCREENING MAMMOGRAM: ICD-10-CM

## 2022-02-18 PROCEDURE — 77063 BREAST TOMOSYNTHESIS BI: CPT

## 2022-02-18 PROCEDURE — 77063 BREAST TOMOSYNTHESIS BI: CPT | Performed by: RADIOLOGY

## 2022-02-18 PROCEDURE — 77067 SCR MAMMO BI INCL CAD: CPT

## 2022-02-18 PROCEDURE — 77067 SCR MAMMO BI INCL CAD: CPT | Performed by: RADIOLOGY

## 2022-02-18 NOTE — PROGRESS NOTES
February 18, 2022    Hello, may I speak with Antonieta Howe?    My name is Rita      I am  with [facility]  [facility]  [address] [id number] [phone].    Before we get started may I verify your date of birth? 1960    I am calling to officially welcome you to our practice and ask about your recent visit. Is this a good time to talk? YES     Tell me about your visit with us. What things went well?  It went really great, everyone was polite and considerate. The Dr was nice and I have a follow up scheduled.       We're always looking for ways to make our patients' experiences even better. Do you have recommendations on ways we may improve?   NO not really    Overall were you satisfied with your first visit to our practice? YES        I appreciate you taking the time to speak with me today. Is there anything else I can do for you?  NO      Thank you, and have a great day.

## 2022-03-11 ENCOUNTER — HOSPITAL ENCOUNTER (OUTPATIENT)
Dept: BONE DENSITY | Facility: HOSPITAL | Age: 62
Discharge: HOME | End: 2022-03-11
Admitting: INTERNAL MEDICINE

## 2022-03-11 DIAGNOSIS — M81.0 AGE-RELATED OSTEOPOROSIS WITHOUT CURRENT PATHOLOGICAL FRACTURE: ICD-10-CM

## 2022-03-11 PROCEDURE — 77080 DXA BONE DENSITY AXIAL: CPT

## 2022-05-13 ENCOUNTER — TRANSCRIBE ORDERS (OUTPATIENT)
Dept: LAB | Facility: HOSPITAL | Age: 62
End: 2022-05-13

## 2022-05-13 ENCOUNTER — LAB (OUTPATIENT)
Dept: LAB | Facility: HOSPITAL | Age: 62
End: 2022-05-13

## 2022-05-13 DIAGNOSIS — B99.9 DILATED CARDIOMYOPATHY SECONDARY TO INFECTION: ICD-10-CM

## 2022-05-13 DIAGNOSIS — B99.9 DILATED CARDIOMYOPATHY SECONDARY TO INFECTION: Primary | ICD-10-CM

## 2022-05-13 DIAGNOSIS — K50.919 CROHN'S DISEASE WITH COMPLICATION, UNSPECIFIED GASTROINTESTINAL TRACT LOCATION: ICD-10-CM

## 2022-05-13 DIAGNOSIS — I43 DILATED CARDIOMYOPATHY SECONDARY TO INFECTION: Primary | ICD-10-CM

## 2022-05-13 DIAGNOSIS — M25.50 PAIN IN JOINT, MULTIPLE SITES: ICD-10-CM

## 2022-05-13 DIAGNOSIS — I34.1 MITRAL VALVE PROLAPSE: ICD-10-CM

## 2022-05-13 DIAGNOSIS — Z00.00 ROUTINE GENERAL MEDICAL EXAMINATION AT A HEALTH CARE FACILITY: ICD-10-CM

## 2022-05-13 DIAGNOSIS — M81.0 SENILE OSTEOPOROSIS: ICD-10-CM

## 2022-05-13 DIAGNOSIS — E78.00 PURE HYPERCHOLESTEROLEMIA: ICD-10-CM

## 2022-05-13 DIAGNOSIS — E55.9 AVITAMINOSIS D: ICD-10-CM

## 2022-05-13 DIAGNOSIS — Z13.6 SCREENING FOR ISCHEMIC HEART DISEASE: ICD-10-CM

## 2022-05-13 DIAGNOSIS — E78.00 PURE HYPERCHOLESTEROLEMIA: Primary | ICD-10-CM

## 2022-05-13 DIAGNOSIS — I43 DILATED CARDIOMYOPATHY SECONDARY TO INFECTION: ICD-10-CM

## 2022-05-13 DIAGNOSIS — Z00.00 ROUTINE GENERAL MEDICAL EXAMINATION AT A HEALTH CARE FACILITY: Primary | ICD-10-CM

## 2022-05-13 LAB
ALBUMIN SERPL-MCNC: 4.5 G/DL (ref 3.5–5.2)
ALBUMIN/GLOB SERPL: 2 G/DL
ALP SERPL-CCNC: 97 U/L (ref 39–117)
ALT SERPL W P-5'-P-CCNC: 22 U/L (ref 1–33)
ANION GAP SERPL CALCULATED.3IONS-SCNC: 14 MMOL/L (ref 5–15)
AST SERPL-CCNC: 18 U/L (ref 1–32)
BASOPHILS # BLD AUTO: 0.03 10*3/MM3 (ref 0–0.2)
BASOPHILS NFR BLD AUTO: 0.5 % (ref 0–1.5)
BILIRUB SERPL-MCNC: 0.6 MG/DL (ref 0–1.2)
BUN SERPL-MCNC: 12 MG/DL (ref 6–20)
BUN/CREAT SERPL: 17.9 (ref 7–25)
CALCIUM SPEC-SCNC: 9.1 MG/DL (ref 8.6–10.5)
CHLORIDE SERPL-SCNC: 103 MMOL/L (ref 98–107)
CK SERPL-CCNC: 175 U/L (ref 20–180)
CO2 SERPL-SCNC: 24 MMOL/L (ref 22–29)
CREAT SERPL-MCNC: 0.67 MG/DL (ref 0.57–1)
DEPRECATED RDW RBC AUTO: 45 FL (ref 37–54)
EGFRCR SERPLBLD CKD-EPI 2021: 100.8 ML/MIN/1.73
EOSINOPHIL # BLD AUTO: 0.12 10*3/MM3 (ref 0–0.4)
EOSINOPHIL NFR BLD AUTO: 2.1 % (ref 0.3–6.2)
ERYTHROCYTE [DISTWIDTH] IN BLOOD BY AUTOMATED COUNT: 14.2 % (ref 12.3–15.4)
ERYTHROCYTE [SEDIMENTATION RATE] IN BLOOD: 9 MM/HR (ref 0–30)
GLOBULIN UR ELPH-MCNC: 2.2 GM/DL
GLUCOSE SERPL-MCNC: 87 MG/DL (ref 65–99)
HCT VFR BLD AUTO: 39.8 % (ref 34–46.6)
HGB BLD-MCNC: 12.8 G/DL (ref 12–15.9)
IMM GRANULOCYTES # BLD AUTO: 0.01 10*3/MM3 (ref 0–0.05)
IMM GRANULOCYTES NFR BLD AUTO: 0.2 % (ref 0–0.5)
LYMPHOCYTES # BLD AUTO: 2.99 10*3/MM3 (ref 0.7–3.1)
LYMPHOCYTES NFR BLD AUTO: 52.1 % (ref 19.6–45.3)
MCH RBC QN AUTO: 28.4 PG (ref 26.6–33)
MCHC RBC AUTO-ENTMCNC: 32.2 G/DL (ref 31.5–35.7)
MCV RBC AUTO: 88.4 FL (ref 79–97)
MONOCYTES # BLD AUTO: 0.75 10*3/MM3 (ref 0.1–0.9)
MONOCYTES NFR BLD AUTO: 13.1 % (ref 5–12)
NEUTROPHILS NFR BLD AUTO: 1.84 10*3/MM3 (ref 1.7–7)
NEUTROPHILS NFR BLD AUTO: 32 % (ref 42.7–76)
NRBC BLD AUTO-RTO: 0 /100 WBC (ref 0–0.2)
PLATELET # BLD AUTO: 385 10*3/MM3 (ref 140–450)
PMV BLD AUTO: 8.8 FL (ref 6–12)
POTASSIUM SERPL-SCNC: 4 MMOL/L (ref 3.5–5.2)
PROT SERPL-MCNC: 6.7 G/DL (ref 6–8.5)
RBC # BLD AUTO: 4.5 10*6/MM3 (ref 3.77–5.28)
SODIUM SERPL-SCNC: 141 MMOL/L (ref 136–145)
VIT B12 BLD-MCNC: 924 PG/ML (ref 211–946)
WBC NRBC COR # BLD: 5.74 10*3/MM3 (ref 3.4–10.8)

## 2022-05-13 PROCEDURE — 36415 COLL VENOUS BLD VENIPUNCTURE: CPT

## 2022-05-13 PROCEDURE — 85652 RBC SED RATE AUTOMATED: CPT

## 2022-05-13 PROCEDURE — 82550 ASSAY OF CK (CPK): CPT

## 2022-05-13 PROCEDURE — 86235 NUCLEAR ANTIGEN ANTIBODY: CPT

## 2022-05-13 PROCEDURE — 85025 COMPLETE CBC W/AUTO DIFF WBC: CPT

## 2022-05-13 PROCEDURE — 86140 C-REACTIVE PROTEIN: CPT

## 2022-05-13 PROCEDURE — 82607 VITAMIN B-12: CPT

## 2022-05-13 PROCEDURE — 80053 COMPREHEN METABOLIC PANEL: CPT

## 2022-05-14 LAB — CRP SERPL-MCNC: <0.3 MG/DL (ref 0–0.5)

## 2022-05-16 LAB
ENA SS-A AB SER-ACNC: <0.2 AI (ref 0–0.9)
ENA SS-B AB SER-ACNC: <0.2 AI (ref 0–0.9)

## 2022-06-01 ENCOUNTER — TRANSCRIBE ORDERS (OUTPATIENT)
Dept: ADMINISTRATIVE | Facility: HOSPITAL | Age: 62
End: 2022-06-01

## 2022-06-01 DIAGNOSIS — I34.1 NONRHEUMATIC MITRAL VALVE PROLAPSE: Primary | ICD-10-CM

## 2022-06-03 ENCOUNTER — TRANSCRIBE ORDERS (OUTPATIENT)
Dept: LAB | Facility: HOSPITAL | Age: 62
End: 2022-06-03

## 2022-06-03 ENCOUNTER — LAB (OUTPATIENT)
Dept: LAB | Facility: HOSPITAL | Age: 62
End: 2022-06-03

## 2022-06-03 DIAGNOSIS — R07.9 CHEST PAIN, UNSPECIFIED TYPE: ICD-10-CM

## 2022-06-03 DIAGNOSIS — R07.9 CHEST PAIN, UNSPECIFIED TYPE: Primary | ICD-10-CM

## 2022-06-03 LAB — D DIMER PPP FEU-MCNC: 0.27 MCGFEU/ML (ref 0.01–0.5)

## 2022-06-03 PROCEDURE — 36415 COLL VENOUS BLD VENIPUNCTURE: CPT

## 2022-06-03 PROCEDURE — 85379 FIBRIN DEGRADATION QUANT: CPT

## 2022-06-10 ENCOUNTER — OFFICE VISIT (OUTPATIENT)
Dept: PULMONOLOGY | Facility: CLINIC | Age: 62
End: 2022-06-10

## 2022-06-10 VITALS
HEIGHT: 62 IN | HEART RATE: 84 BPM | TEMPERATURE: 98 F | OXYGEN SATURATION: 97 % | SYSTOLIC BLOOD PRESSURE: 130 MMHG | DIASTOLIC BLOOD PRESSURE: 82 MMHG | BODY MASS INDEX: 30.73 KG/M2 | WEIGHT: 167 LBS

## 2022-06-10 DIAGNOSIS — Z87.891 FORMER SMOKER: ICD-10-CM

## 2022-06-10 DIAGNOSIS — Z87.09 HISTORY OF ASTHMA: ICD-10-CM

## 2022-06-10 DIAGNOSIS — R05.9 COUGH: ICD-10-CM

## 2022-06-10 DIAGNOSIS — K21.9 CHRONIC GERD: ICD-10-CM

## 2022-06-10 DIAGNOSIS — J40 FREQUENT EPISODES OF BRONCHITIS: Primary | ICD-10-CM

## 2022-06-10 PROCEDURE — 99214 OFFICE O/P EST MOD 30 MIN: CPT | Performed by: INTERNAL MEDICINE

## 2022-06-10 NOTE — PROGRESS NOTES
PULMONARY  NOTE    Chief Complaint     Episodic coughing, GERD, hiatal hernia, Crohn's, recurrent bronchitis    History of Present Illness     61-year-old female returns today for follow-up  Initially saw her 2/10/2022    Minimal smoking history, none since 1999    History of asthma with a variety of inhalers in the past    Frequent episodes of bronchitis which typically responded to 1-2 courses of antibiotics and/or steroids    History of reflux and Perez's esophagus    Prior work-up included normal PFTs and a clear chest x-ray  We discussed reflux precautions on her last visit  She indicates that since February she has had no episodes of acute bronchitis and she only coughs occasionally and her reflux symptoms are better    She did have an episode where she noticed decreased saturations and some shortness of breath but that resolved  She is scheduled for a transthoracic echocardiogram    Patient Active Problem List   Diagnosis   • Foot pain, bilateral   • Venous stasis   • Infarction of spleen   • Osteoporosis   • Diverticulitis   • Mixed hyperlipidemia   • Mitral valve prolapse   • Enteritis   • Acquired asplenia   • Chronic GERD   • Paroxysmal cough   • Frequent episodes of bronchitis   • Remote, minimal smoker   • History of asthma     Allergies   Allergen Reactions   • Levaquin [Levofloxacin] Other (See Comments)     Patient states she gets really bad plantar fasciitis   • Bactrim [Sulfamethoxazole-Trimethoprim] Itching and Rash   • Beeswax Rash   • Other Itching and Rash     Kapidex   • Sulfa Antibiotics Rash   • Zosyn [Piperacillin Sod-Tazobactam So] Rash       Current Outpatient Medications:   •  albuterol (PROVENTIL) (2.5 MG/3ML) 0.083% nebulizer solution, Take 2.5 mg by nebulization 4 (Four) Times a Day As Needed., Disp: 90 mL, Rfl: 1  •  albuterol sulfate  (90 Base) MCG/ACT inhaler, Inhale 2 puffs Every 4 (Four) Hours As Needed., Disp: 18 g, Rfl: 5  •  albuterol sulfate  (90 Base)  MCG/ACT inhaler, Inhale 2 puffs Every 4 (Four) Hours As Needed., Disp: 18 g, Rfl: 5  •  azelastine (ASTELIN) 0.1 % nasal spray, Instill 1-2 sprays into the nostril(s) as directed by provider 2 (Two) Times a Day., Disp: 120 mL, Rfl: 5  •  azelastine (ASTEPRO) 0.15 % solution nasal spray, 1 spray into the nostril(s) as directed by provider 2 (Two) Times a Day., Disp: 30 mL, Rfl: 10  •  betamethasone, augmented, (DIPROLENE) 0.05 % cream, Apply topically to the appropriate area as directed 2 (Two) Times a Day for 14 days, then decrease to 2-3 times weekly, Disp: 50 g, Rfl: 1  •  Calcium Polycarbophil (FIBER-CAPS PO), Take  by mouth Daily., Disp: , Rfl:   •  clopidogrel (PLAVIX) 75 MG tablet, Take 1 tablet by mouth Daily., Disp: 90 tablet, Rfl: 1  •  Coenzyme Q10 (COQ10) 200 MG capsule, Take 200 mg by mouth Take As Directed., Disp: , Rfl:   •  denosumab (PROLIA) 60 MG/ML solution prefilled syringe syringe, Inject  under the skin into the appropriate area as directed., Disp: , Rfl:   •  esomeprazole (nexIUM) 40 MG capsule, Take 40 mg by mouth Take As Directed., Disp: , Rfl:   •  esomeprazole (nexIUM) 40 MG capsule, Take 1 capsule by mouth 2 (Two) Times a Day., Disp: 180 capsule, Rfl: 3  •  famotidine (PEPCID) 40 MG tablet, Take 1 tablet by mouth 2 (Two) Times a Day., Disp: 180 tablet, Rfl: 3  •  famotidine (PEPCID) 40 MG tablet, Take 1 tablet by mouth 2 (Two) Times a Day., Disp: 180 tablet, Rfl: 3  •  hyoscyamine (ANASPAZ,LEVSIN) 0.125 MG tablet, Take 1 tablet by mouth Every 8 (Eight) Hours As Needed for abdominal cramping, Disp: 30 tablet, Rfl: 3  •  ipratropium-albuterol (Combivent Respimat)  MCG/ACT inhaler, Inhale 1 puff 4 (Four) Times a Day., Disp: 12 g, Rfl: 1  •  ketotifen (Zaditor) 0.025 % ophthalmic solution, Apply 1 drop to eye(s) as directed by provider 2 (Two) Times a Day., Disp: 5 mL, Rfl: 5  •  mesalamine (DELZICOL) 400 MG capsule delayed-release delayed release capsule, Take 1 capsule by mouth 3  (Three) Times a Day., Disp: 90 capsule, Rfl: 6  •  mesalamine (Pentasa) 500 MG CR capsule, Take 1 capsule by mouth 3 (Three) Times a Day., Disp: 270 capsule, Rfl: 4  •  mesalamine (Pentasa) 500 MG CR capsule, Take 1 capsule by mouth 3 (Three) Times a Day., Disp: 270 capsule, Rfl: 4  •  methocarbamol (ROBAXIN) 500 MG tablet, Take 1 tablet by mouth Daily As Needed for Muscle Spasms., Disp: 30 tablet, Rfl: 0  •  methocarbamol (ROBAXIN) 500 MG tablet, Take 1 tablet by mouth Every 6-8  Hours As Needed., Disp: 30 tablet, Rfl: 1  •  montelukast (Singulair) 10 MG tablet, Take 1 tablet by mouth Every Morning. (Patient taking differently: Take 10 mg by mouth Daily As Needed.), Disp: 30 tablet, Rfl: 1  •  MULTIPLE VITAMIN PO, Take 1 tablet by mouth Take As Directed., Disp: , Rfl:   •  nebivolol (Bystolic) 5 MG tablet, Take 1 tablet by mouth Daily., Disp: 90 tablet, Rfl: 3  •  Probiotic Product (PROBIOTIC ADVANCED PO), Take  by mouth Daily., Disp: , Rfl:   •  risedronate (ACTONEL) 150 MG tablet, Take 1 tablet by mouth Every 30 (Thirty) Days. with water on empty stomach, nothing by mouth or lie down for next 30 minutes., Disp: 3 tablet, Rfl: 1  •  rosuvastatin (Crestor) 10 MG tablet, Take 1 tablet by mouth Daily., Disp: 90 tablet, Rfl: 3  •  VITAMIN D, CHOLECALCIFEROL, PO, Take 5,000 Units by mouth Take As Directed., Disp: , Rfl:   •  vitamin D3 125 MCG (5000 UT) capsule capsule, Take  by mouth., Disp: , Rfl:   MEDICATION LIST AND ALLERGIES REVIEWED.    Family History   Problem Relation Age of Onset   • Heart attack Mother    • Hypertension Mother    • Stroke Mother    • Stroke Father    • Heart attack Other    • Heart disease Other    • Stroke Other    • Depression Other    • Hypertension Other    • Breast cancer Neg Hx    • Ovarian cancer Neg Hx      Social History     Tobacco Use   • Smoking status: Former Smoker     Packs/day: 0.50     Years: 10.00     Pack years: 5.00     Types: Cigarettes     Quit date: 1999     Years  "since quittin.4   • Smokeless tobacco: Never Used   Substance Use Topics   • Alcohol use: No   • Drug use: No     Social History     Social History Narrative    , works as a nurse at  [employer]    Has about a 10-pack-year smoking history, none since     Does not drink alcohol    No vaping     FAMILY AND SOCIAL HISTORY REVIEWED.    Review of Systems  ALSO REFER TO SCANNED ROS SHEET FROM SAME DATE.    /82   Pulse 84   Temp 98 °F (36.7 °C)   Ht 157.5 cm (62\")   Wt 75.8 kg (167 lb)   LMP  (LMP Unknown)   SpO2 97% Comment: resting, room air  Breastfeeding No   BMI 30.54 kg/m²   Physical Exam  Vitals and nursing note reviewed.   Constitutional:       General: She is not in acute distress.     Appearance: She is well-developed. She is not diaphoretic.   HENT:      Head: Normocephalic and atraumatic.   Neck:      Thyroid: No thyromegaly.   Cardiovascular:      Rate and Rhythm: Normal rate and regular rhythm.      Heart sounds: Normal heart sounds. No murmur heard.  Pulmonary:      Effort: Pulmonary effort is normal.      Breath sounds: Normal breath sounds. No stridor.   Chest:   Breasts:      Right: No supraclavicular adenopathy.      Left: No supraclavicular adenopathy.       Lymphadenopathy:      Cervical: No cervical adenopathy.      Upper Body:      Right upper body: No supraclavicular or epitrochlear adenopathy.      Left upper body: No supraclavicular or epitrochlear adenopathy.   Skin:     General: Skin is warm and dry.   Neurological:      Mental Status: She is alert and oriented to person, place, and time.   Psychiatric:         Behavior: Behavior normal.         Results     Immunization History   Administered Date(s) Administered   • COVID-19 (PFIZER) PURPLE CAP 2021, 2021, 10/15/2021   • Fluzone Split Quad (Multi-dose) 2012, 2013, 10/01/2015, 10/01/2017, 10/01/2019, 10/27/2021   • Hepatitis A 2020, 2021   • INFLUENZA SPLIT TRI 10/14/2021   • " Pneumococcal Polysaccharide (PPSV23) 01/22/2010, 01/22/2010, 02/21/2020   • Shingrix 05/14/2021, 11/19/2021   • Tdap 01/25/2010, 01/25/2010, 02/21/2020     Problem List       ICD-10-CM ICD-9-CM   1. Frequent episodes of bronchitis  J40 490   2. Chronic GERD  K21.9 530.81   3. Paroxysmal cough  R05.9 786.2   4. Remote, minimal smoker  Z87.891 V15.82   5. History of asthma  Z87.09 V12.69       Discussion     It appears that her cough and reflux symptoms are improved  I would continue with reflux precautions    As long she remains stable/improved I will just see her back on an annual basis for surveillance for autoimmune related lung disease    We talked about her chest x-rays which have shown borderline cardiac enlargement  She is scheduled for a transthoracic echocardiogram    For her history of asthma she has as needed bronchodilators    If there is any problem with that I will see her back earlier but otherwise I will see her back in February 2023    Moderate level of Medical Decision Making complexity based on 2 or more chronic stable illnesses and prescription drug management.    Umang Rainey MD  Note electronically signed    CC: Tavo Valderrama MD

## 2022-06-24 ENCOUNTER — HOSPITAL ENCOUNTER (OUTPATIENT)
Dept: CARDIOLOGY | Facility: HOSPITAL | Age: 62
Discharge: HOME | End: 2022-06-24
Admitting: INTERNAL MEDICINE

## 2022-06-24 VITALS — HEIGHT: 62 IN | BODY MASS INDEX: 30.73 KG/M2 | WEIGHT: 167 LBS

## 2022-06-24 DIAGNOSIS — I34.1 NONRHEUMATIC MITRAL VALVE PROLAPSE: ICD-10-CM

## 2022-06-24 LAB
ASCENDING AORTA: 3.2 CM
BH CV ECHO MEAS - AO MAX PG: 8.6 MMHG
BH CV ECHO MEAS - AO MEAN PG: 4.7 MMHG
BH CV ECHO MEAS - AO ROOT DIAM: 3 CM
BH CV ECHO MEAS - AO V2 MAX: 146.3 CM/SEC
BH CV ECHO MEAS - AO V2 VTI: 35.2 CM
BH CV ECHO MEAS - AVA(I,D): 1.69 CM2
BH CV ECHO MEAS - EDV(CUBED): 91.1 ML
BH CV ECHO MEAS - EDV(MOD-SP2): 76.9 ML
BH CV ECHO MEAS - EDV(MOD-SP4): 87.7 ML
BH CV ECHO MEAS - EF(MOD-BP): 69.6 %
BH CV ECHO MEAS - EF(MOD-SP2): 67.8 %
BH CV ECHO MEAS - EF(MOD-SP4): 70 %
BH CV ECHO MEAS - ESV(CUBED): 19.7 ML
BH CV ECHO MEAS - ESV(MOD-SP2): 24.8 ML
BH CV ECHO MEAS - ESV(MOD-SP4): 26.3 ML
BH CV ECHO MEAS - FS: 40 %
BH CV ECHO MEAS - IVS/LVPW: 1.1 CM
BH CV ECHO MEAS - IVSD: 1.1 CM
BH CV ECHO MEAS - LA DIMENSION: 3.4 CM
BH CV ECHO MEAS - LAT PEAK E' VEL: 11.9 CM/SEC
BH CV ECHO MEAS - LV MASS(C)D: 164 GRAMS
BH CV ECHO MEAS - LV MAX PG: 2.8 MMHG
BH CV ECHO MEAS - LV MEAN PG: 1.5 MMHG
BH CV ECHO MEAS - LV V1 MAX: 83.9 CM/SEC
BH CV ECHO MEAS - LV V1 VTI: 20.9 CM
BH CV ECHO MEAS - LVIDD: 4.5 CM
BH CV ECHO MEAS - LVIDS: 2.7 CM
BH CV ECHO MEAS - LVOT AREA: 2.8 CM2
BH CV ECHO MEAS - LVOT DIAM: 1.9 CM
BH CV ECHO MEAS - LVPWD: 1 CM
BH CV ECHO MEAS - MED PEAK E' VEL: 9.7 CM/SEC
BH CV ECHO MEAS - MV A MAX VEL: 55.5 CM/SEC
BH CV ECHO MEAS - MV DEC SLOPE: 522 CM/SEC2
BH CV ECHO MEAS - MV DEC TIME: 0.27 MSEC
BH CV ECHO MEAS - MV E MAX VEL: 82 CM/SEC
BH CV ECHO MEAS - MV E/A: 1.48
BH CV ECHO MEAS - MV P1/2T: 69.6 MSEC
BH CV ECHO MEAS - MVA(P1/2T): 3.2 CM2
BH CV ECHO MEAS - PA ACC TIME: 0.16 SEC
BH CV ECHO MEAS - PA PR(ACCEL): 7 MMHG
BH CV ECHO MEAS - PA V2 MAX: 89.8 CM/SEC
BH CV ECHO MEAS - PAPD(PI EDV): 5 MMHG
BH CV ECHO MEAS - PI END-D VEL: 113.3 CM/SEC
BH CV ECHO MEAS - RAP SYSTOLE: 3 MMHG
BH CV ECHO MEAS - RVSP: 23 MMHG
BH CV ECHO MEAS - SV(LVOT): 59.3 ML
BH CV ECHO MEAS - SV(MOD-SP2): 52.1 ML
BH CV ECHO MEAS - SV(MOD-SP4): 61.4 ML
BH CV ECHO MEAS - TAPSE (>1.6): 2.33 CM
BH CV ECHO MEAS - TR MAX PG: 20.4 MMHG
BH CV ECHO MEAS - TR MAX VEL: 226 CM/SEC
BH CV ECHO MEASUREMENTS AVERAGE E/E' RATIO: 7.59
BH CV VAS BP LEFT ARM: NORMAL MMHG
BH CV XLRA - RV BASE: 3.4 CM
BH CV XLRA - RV LENGTH: 6.4 CM
BH CV XLRA - RV MID: 3.2 CM
BH CV XLRA - TDI S': 10.2 CM/SEC
IVRT: 99 MSEC
LEFT ATRIUM VOLUME INDEX: 27.6 ML/M2
MAXIMAL PREDICTED HEART RATE: 161 BPM
STRESS TARGET HR: 137 BPM

## 2022-06-24 PROCEDURE — 93306 TTE W/DOPPLER COMPLETE: CPT | Performed by: INTERNAL MEDICINE

## 2022-06-24 PROCEDURE — 93306 TTE W/DOPPLER COMPLETE: CPT

## 2022-07-25 PROCEDURE — U0004 COV-19 TEST NON-CDC HGH THRU: HCPCS | Performed by: NURSE PRACTITIONER

## 2022-10-08 ENCOUNTER — LAB (OUTPATIENT)
Dept: LAB | Facility: HOSPITAL | Age: 62
End: 2022-10-08

## 2022-10-08 ENCOUNTER — TRANSCRIBE ORDERS (OUTPATIENT)
Dept: LAB | Facility: HOSPITAL | Age: 62
End: 2022-10-08

## 2022-10-08 DIAGNOSIS — R73.03 PREDIABETES: ICD-10-CM

## 2022-10-08 DIAGNOSIS — E78.00 PURE HYPERCHOLESTEROLEMIA: ICD-10-CM

## 2022-10-08 DIAGNOSIS — R60.0 LOCALIZED EDEMA: ICD-10-CM

## 2022-10-08 DIAGNOSIS — R60.0 LOCALIZED EDEMA: Primary | ICD-10-CM

## 2022-10-08 LAB
ALBUMIN SERPL-MCNC: 4.5 G/DL (ref 3.5–5.2)
ALBUMIN/GLOB SERPL: 2.6 G/DL
ALP SERPL-CCNC: 90 U/L (ref 39–117)
ALT SERPL W P-5'-P-CCNC: 20 U/L (ref 1–33)
ANION GAP SERPL CALCULATED.3IONS-SCNC: 8.7 MMOL/L (ref 5–15)
AST SERPL-CCNC: 21 U/L (ref 1–32)
BASOPHILS # BLD AUTO: 0.04 10*3/MM3 (ref 0–0.2)
BASOPHILS NFR BLD AUTO: 0.8 % (ref 0–1.5)
BILIRUB SERPL-MCNC: 0.7 MG/DL (ref 0–1.2)
BUN SERPL-MCNC: 10 MG/DL (ref 8–23)
BUN/CREAT SERPL: 16.4 (ref 7–25)
CALCIUM SPEC-SCNC: 8.8 MG/DL (ref 8.6–10.5)
CHLORIDE SERPL-SCNC: 106 MMOL/L (ref 98–107)
CO2 SERPL-SCNC: 27.3 MMOL/L (ref 22–29)
CREAT SERPL-MCNC: 0.61 MG/DL (ref 0.57–1)
D DIMER PPP FEU-MCNC: 0.28 MCGFEU/ML (ref 0.01–0.5)
DEPRECATED RDW RBC AUTO: 48.9 FL (ref 37–54)
EGFRCR SERPLBLD CKD-EPI 2021: 102.5 ML/MIN/1.73
EOSINOPHIL # BLD AUTO: 0.19 10*3/MM3 (ref 0–0.4)
EOSINOPHIL NFR BLD AUTO: 3.9 % (ref 0.3–6.2)
ERYTHROCYTE [DISTWIDTH] IN BLOOD BY AUTOMATED COUNT: 15.1 % (ref 12.3–15.4)
GLOBULIN UR ELPH-MCNC: 1.7 GM/DL
GLUCOSE 1H P 100 G GLC PO SERPL-MCNC: 110 MG/DL (ref 74–180)
GLUCOSE 2H P 100 G GLC PO SERPL-MCNC: 81 MG/DL (ref 74–155)
GLUCOSE P FAST SERPL-MCNC: 98 MG/DL (ref 74–106)
GLUCOSE SERPL-MCNC: 96 MG/DL (ref 65–99)
HCT VFR BLD AUTO: 37.5 % (ref 34–46.6)
HGB BLD-MCNC: 11.7 G/DL (ref 12–15.9)
IMM GRANULOCYTES # BLD AUTO: 0.01 10*3/MM3 (ref 0–0.05)
IMM GRANULOCYTES NFR BLD AUTO: 0.2 % (ref 0–0.5)
LYMPHOCYTES # BLD AUTO: 2.59 10*3/MM3 (ref 0.7–3.1)
LYMPHOCYTES NFR BLD AUTO: 52.9 % (ref 19.6–45.3)
MCH RBC QN AUTO: 27.9 PG (ref 26.6–33)
MCHC RBC AUTO-ENTMCNC: 31.2 G/DL (ref 31.5–35.7)
MCV RBC AUTO: 89.5 FL (ref 79–97)
MONOCYTES # BLD AUTO: 0.73 10*3/MM3 (ref 0.1–0.9)
MONOCYTES NFR BLD AUTO: 14.9 % (ref 5–12)
NEUTROPHILS NFR BLD AUTO: 1.34 10*3/MM3 (ref 1.7–7)
NEUTROPHILS NFR BLD AUTO: 27.3 % (ref 42.7–76)
NRBC BLD AUTO-RTO: 0 /100 WBC (ref 0–0.2)
PLATELET # BLD AUTO: 398 10*3/MM3 (ref 140–450)
PMV BLD AUTO: 8.8 FL (ref 6–12)
POTASSIUM SERPL-SCNC: 4.1 MMOL/L (ref 3.5–5.2)
PROT SERPL-MCNC: 6.2 G/DL (ref 6–8.5)
RBC # BLD AUTO: 4.19 10*6/MM3 (ref 3.77–5.28)
SODIUM SERPL-SCNC: 142 MMOL/L (ref 136–145)
WBC NRBC COR # BLD: 4.9 10*3/MM3 (ref 3.4–10.8)

## 2022-10-08 PROCEDURE — 80053 COMPREHEN METABOLIC PANEL: CPT

## 2022-10-08 PROCEDURE — 36415 COLL VENOUS BLD VENIPUNCTURE: CPT

## 2022-10-08 PROCEDURE — 85379 FIBRIN DEGRADATION QUANT: CPT

## 2022-10-08 PROCEDURE — 80061 LIPID PANEL: CPT

## 2022-10-08 PROCEDURE — 85025 COMPLETE CBC W/AUTO DIFF WBC: CPT

## 2022-10-08 PROCEDURE — 83704 LIPOPROTEIN BLD QUAN PART: CPT

## 2022-10-08 PROCEDURE — 82951 GLUCOSE TOLERANCE TEST (GTT): CPT

## 2022-10-11 LAB
CHOLEST SERPL-MCNC: 174 MG/DL (ref 100–199)
HDL SERPL-SCNC: 33.7 UMOL/L
HDLC SERPL-MCNC: 60 MG/DL
LDL SERPL QN: 21.2 NM
LDL SERPL-SCNC: 1074 NMOL/L
LDL SMALL SERPL-SCNC: 261 NMOL/L
LDLC SERPL CALC-MCNC: 91 MG/DL (ref 0–99)
TRIGL SERPL-MCNC: 132 MG/DL (ref 0–149)

## 2023-04-14 ENCOUNTER — TRANSCRIBE ORDERS (OUTPATIENT)
Dept: GENERAL RADIOLOGY | Facility: HOSPITAL | Age: 63
End: 2023-04-14
Payer: COMMERCIAL

## 2023-04-14 ENCOUNTER — TRANSCRIBE ORDERS (OUTPATIENT)
Dept: LAB | Facility: HOSPITAL | Age: 63
End: 2023-04-14
Payer: COMMERCIAL

## 2023-04-14 ENCOUNTER — HOSPITAL ENCOUNTER (OUTPATIENT)
Dept: GENERAL RADIOLOGY | Facility: HOSPITAL | Age: 63
Discharge: HOME | End: 2023-04-14
Payer: COMMERCIAL

## 2023-04-14 ENCOUNTER — LAB (OUTPATIENT)
Dept: LAB | Facility: HOSPITAL | Age: 63
End: 2023-04-14
Payer: COMMERCIAL

## 2023-04-14 DIAGNOSIS — M25.50 PAIN IN JOINT, MULTIPLE SITES: ICD-10-CM

## 2023-04-14 DIAGNOSIS — B99.9 DILATED CARDIOMYOPATHY SECONDARY TO INFECTION: ICD-10-CM

## 2023-04-14 DIAGNOSIS — M81.0 OSTEOPOROSIS, POST-MENOPAUSAL: ICD-10-CM

## 2023-04-14 DIAGNOSIS — B99.9 DILATED CARDIOMYOPATHY SECONDARY TO INFECTION: Primary | ICD-10-CM

## 2023-04-14 DIAGNOSIS — E55.9 AVITAMINOSIS D: ICD-10-CM

## 2023-04-14 DIAGNOSIS — I43 DILATED CARDIOMYOPATHY SECONDARY TO INFECTION: ICD-10-CM

## 2023-04-14 DIAGNOSIS — K50.919 CROHN'S DISEASE WITH COMPLICATION, UNSPECIFIED GASTROINTESTINAL TRACT LOCATION: ICD-10-CM

## 2023-04-14 DIAGNOSIS — M25.50 PAIN IN JOINT, MULTIPLE SITES: Primary | ICD-10-CM

## 2023-04-14 DIAGNOSIS — I43 DILATED CARDIOMYOPATHY SECONDARY TO INFECTION: Primary | ICD-10-CM

## 2023-04-14 LAB
25(OH)D3 SERPL-MCNC: 61 NG/ML (ref 30–100)
ALBUMIN SERPL-MCNC: 4.5 G/DL (ref 3.5–5.2)
ALBUMIN/GLOB SERPL: 2 G/DL
ALP SERPL-CCNC: 94 U/L (ref 39–117)
ALT SERPL W P-5'-P-CCNC: 25 U/L (ref 1–33)
ANION GAP SERPL CALCULATED.3IONS-SCNC: 11.7 MMOL/L (ref 5–15)
AST SERPL-CCNC: 16 U/L (ref 1–32)
BASOPHILS # BLD AUTO: 0.03 10*3/MM3 (ref 0–0.2)
BASOPHILS NFR BLD AUTO: 0.4 % (ref 0–1.5)
BILIRUB SERPL-MCNC: 0.5 MG/DL (ref 0–1.2)
BUN SERPL-MCNC: 18 MG/DL (ref 8–23)
BUN/CREAT SERPL: 23.4 (ref 7–25)
CALCIUM SPEC-SCNC: 9.5 MG/DL (ref 8.6–10.5)
CHLORIDE SERPL-SCNC: 102 MMOL/L (ref 98–107)
CO2 SERPL-SCNC: 26.3 MMOL/L (ref 22–29)
CREAT SERPL-MCNC: 0.77 MG/DL (ref 0.57–1)
DEPRECATED RDW RBC AUTO: 46.9 FL (ref 37–54)
EGFRCR SERPLBLD CKD-EPI 2021: 88.4 ML/MIN/1.73
EOSINOPHIL # BLD AUTO: 0.17 10*3/MM3 (ref 0–0.4)
EOSINOPHIL NFR BLD AUTO: 2.3 % (ref 0.3–6.2)
ERYTHROCYTE [DISTWIDTH] IN BLOOD BY AUTOMATED COUNT: 14.7 % (ref 12.3–15.4)
GLOBULIN UR ELPH-MCNC: 2.2 GM/DL
GLUCOSE SERPL-MCNC: 94 MG/DL (ref 65–99)
HCT VFR BLD AUTO: 37.9 % (ref 34–46.6)
HGB BLD-MCNC: 12.2 G/DL (ref 12–15.9)
IMM GRANULOCYTES # BLD AUTO: 0.02 10*3/MM3 (ref 0–0.05)
IMM GRANULOCYTES NFR BLD AUTO: 0.3 % (ref 0–0.5)
LYMPHOCYTES # BLD AUTO: 2.69 10*3/MM3 (ref 0.7–3.1)
LYMPHOCYTES NFR BLD AUTO: 35.7 % (ref 19.6–45.3)
MCH RBC QN AUTO: 28 PG (ref 26.6–33)
MCHC RBC AUTO-ENTMCNC: 32.2 G/DL (ref 31.5–35.7)
MCV RBC AUTO: 86.9 FL (ref 79–97)
MONOCYTES # BLD AUTO: 0.85 10*3/MM3 (ref 0.1–0.9)
MONOCYTES NFR BLD AUTO: 11.3 % (ref 5–12)
NEUTROPHILS NFR BLD AUTO: 3.77 10*3/MM3 (ref 1.7–7)
NEUTROPHILS NFR BLD AUTO: 50 % (ref 42.7–76)
NRBC BLD AUTO-RTO: 0 /100 WBC (ref 0–0.2)
PLATELET # BLD AUTO: 410 10*3/MM3 (ref 140–450)
PMV BLD AUTO: 8.8 FL (ref 6–12)
POTASSIUM SERPL-SCNC: 4.3 MMOL/L (ref 3.5–5.2)
PROT SERPL-MCNC: 6.7 G/DL (ref 6–8.5)
RBC # BLD AUTO: 4.36 10*6/MM3 (ref 3.77–5.28)
SODIUM SERPL-SCNC: 140 MMOL/L (ref 136–145)
WBC NRBC COR # BLD: 7.53 10*3/MM3 (ref 3.4–10.8)

## 2023-04-14 PROCEDURE — 80053 COMPREHEN METABOLIC PANEL: CPT

## 2023-04-14 PROCEDURE — 85025 COMPLETE CBC W/AUTO DIFF WBC: CPT

## 2023-04-14 PROCEDURE — 73521 X-RAY EXAM HIPS BI 2 VIEWS: CPT

## 2023-04-14 PROCEDURE — 82306 VITAMIN D 25 HYDROXY: CPT

## 2023-04-14 PROCEDURE — 36415 COLL VENOUS BLD VENIPUNCTURE: CPT

## 2023-05-19 ENCOUNTER — OFFICE VISIT (OUTPATIENT)
Dept: INTERNAL MEDICINE | Facility: CLINIC | Age: 63
End: 2023-05-19
Payer: COMMERCIAL

## 2023-05-19 VITALS
OXYGEN SATURATION: 98 % | WEIGHT: 171 LBS | DIASTOLIC BLOOD PRESSURE: 78 MMHG | HEART RATE: 69 BPM | BODY MASS INDEX: 31.47 KG/M2 | HEIGHT: 62 IN | TEMPERATURE: 98 F | SYSTOLIC BLOOD PRESSURE: 120 MMHG

## 2023-05-19 DIAGNOSIS — I34.1 MITRAL VALVE PROLAPSE: ICD-10-CM

## 2023-05-19 DIAGNOSIS — Z91.09 ENVIRONMENTAL ALLERGIES: ICD-10-CM

## 2023-05-19 DIAGNOSIS — K21.9 CHRONIC GERD: ICD-10-CM

## 2023-05-19 DIAGNOSIS — M81.0 OSTEOPOROSIS, UNSPECIFIED OSTEOPOROSIS TYPE, UNSPECIFIED PATHOLOGICAL FRACTURE PRESENCE: ICD-10-CM

## 2023-05-19 DIAGNOSIS — Z87.81 HISTORY OF COMPRESSION FRACTURE OF SPINE: ICD-10-CM

## 2023-05-19 DIAGNOSIS — Z86.718 HISTORY OF BLOOD CLOTS: ICD-10-CM

## 2023-05-19 DIAGNOSIS — L30.1 DYSHIDROTIC ECZEMA: ICD-10-CM

## 2023-05-19 DIAGNOSIS — Z87.09 HISTORY OF ASTHMA: Primary | ICD-10-CM

## 2023-05-19 DIAGNOSIS — E78.2 MIXED HYPERLIPIDEMIA: ICD-10-CM

## 2023-05-19 DIAGNOSIS — Z90.81 ACQUIRED ASPLENIA: ICD-10-CM

## 2023-05-19 DIAGNOSIS — K50.10 CROHN'S DISEASE OF COLON WITHOUT COMPLICATION: ICD-10-CM

## 2023-05-19 DIAGNOSIS — K44.9 HIATAL HERNIA: ICD-10-CM

## 2023-05-19 DIAGNOSIS — R22.9 SUBCUTANEOUS MASS: ICD-10-CM

## 2023-05-19 PROBLEM — R73.03 PREDIABETES: Status: ACTIVE | Noted: 2022-10-03

## 2023-05-19 PROBLEM — M79.672 FOOT PAIN, BILATERAL: Status: RESOLVED | Noted: 2017-11-06 | Resolved: 2023-05-19

## 2023-05-19 PROBLEM — K22.70 BARRETT'S ESOPHAGUS: Status: ACTIVE | Noted: 2020-02-21

## 2023-05-19 PROBLEM — K57.92 DIVERTICULITIS: Status: RESOLVED | Noted: 2019-01-23 | Resolved: 2023-05-19

## 2023-05-19 PROBLEM — M79.671 FOOT PAIN, BILATERAL: Status: RESOLVED | Noted: 2017-11-06 | Resolved: 2023-05-19

## 2023-05-19 PROBLEM — K52.9 ENTERITIS: Status: RESOLVED | Noted: 2019-11-02 | Resolved: 2023-05-19

## 2023-05-19 RX ORDER — DIOSMIN COMPLEX NO.1 630 MG
TABLET ORAL
COMMUNITY

## 2023-05-19 RX ORDER — MESALAMINE 400 MG/1
1 CAPSULE, DELAYED RELEASE ORAL EVERY 8 HOURS
COMMUNITY

## 2023-05-19 NOTE — PROGRESS NOTES
Internal Medicine New Patient  Antonieta Howe is a 62 y.o. female who presents today to establish care and with concerns as outlined below.    Chief Complaint  Chief Complaint   Patient presents with   • Hyperlipidemia   • Heartburn   • Establish Care        HPI  Ms. Howe comes in today to establish care. Last a patient of Dr. Valderrama. She sees Dr. Guillen for chrohns disease on mesalamine and Dr. Alva who manages her osteoporosis on prolia. 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 She is on plavix. She has chronic respiratory issues that have been attributed to asthma in the past. She is currently seeing pulmonary. She has inhalers and nebulizers PRN. She has eczema and allergies. She is on PPI and pepcid BID for GERD. She has a hiatal hernia and has had barretts esophagus in the past however believes last EGD was normal. She has MVP and is asymptomatic on nebivolol. She has HLD on crestor. She has noted subcutaneous nodules on R shin since December and more recently on the L shin. The overlying skin is slightly hyperpigmented. She has had superficial venous thrombosis in the past but this is not painful like those were.       Review of Systems  Review of Systems   Constitutional: Negative.    Respiratory: Negative.    Cardiovascular: Negative.    Gastrointestinal: Negative.    Genitourinary: Negative.    Musculoskeletal: Positive for back pain.   Skin: Positive for color change, rash (eczema) and skin lesions.   Psychiatric/Behavioral: Negative.         Past Medical History  Past Medical History:   Diagnosis Date   • Achilles tendonitis    • Diverticulitis 2019   • Esophagitis    • Gastrointestinal disorder    • Hepatitis    • Hyperlipidemia    • Osteoarthritis    • Osteoporosis    • Plantar fasciitis    • Skin disorder    • Splenic infarct         Surgical History  Past Surgical History:   Procedure Laterality Date   •  SECTION     • CHOLECYSTECTOMY     •  HYSTERECTOMY     • LAPAROSCOPIC TUBAL LIGATION     • LAPAROSCOPY DIAGNOSTIC / BIOPSY / ASPIRATION / LYSIS     • SPLENECTOMY          Family History  Family History   Problem Relation Age of Onset   • Heart attack Mother    • Hypertension Mother    • Stroke Mother    • Stroke Father    • Heart attack Other    • Heart disease Other    • Stroke Other    • Depression Other    • Hypertension Other    • Breast cancer Neg Hx    • Ovarian cancer Neg Hx         Social History  Social History     Socioeconomic History   • Marital status:    Tobacco Use   • Smoking status: Former     Packs/day: 0.50     Years: 10.00     Pack years: 5.00     Types: Cigarettes     Quit date:      Years since quittin.3   • Smokeless tobacco: Never   Substance and Sexual Activity   • Alcohol use: No   • Drug use: No   • Sexual activity: Defer        Current Medications  Current Outpatient Medications on File Prior to Visit   Medication Sig Dispense Refill   • albuterol (PROVENTIL) (2.5 MG/3ML) 0.083% nebulizer solution Take 2.5 mg by nebulization 4 (Four) Times a Day As Needed. 90 mL 1   • albuterol sulfate  (90 Base) MCG/ACT inhaler Inhale 2 puffs Every 4 (Four) Hours As Needed. 18 g 5   • azelastine (ASTEPRO) 0.15 % solution nasal spray 1 spray into the nostril(s) as directed by provider 2 (Two) Times a Day. 30 mL 10   • betamethasone, augmented, (DIPROLENE) 0.05 % cream Apply topically to the appropriate area as directed 2 (Two) Times a Day for 14 days, then decrease to 2-3 times weekly 50 g 1   • Calcium Polycarbophil (FIBER-CAPS PO) Take  by mouth Daily.     • clopidogrel (PLAVIX) 75 MG tablet Take 1 tablet by mouth Daily. 90 tablet 3   • Coenzyme Q10 (COQ10) 200 MG capsule Take 1 capsule by mouth Take As Directed.     • denosumab (PROLIA) 60 MG/ML solution prefilled syringe syringe Inject  under the skin into the appropriate area as directed.     • Dietary Management Product (Vasculera) tablet Vasculera     •  esomeprazole (nexIUM) 40 MG capsule Take 1 capsule by mouth 2 (Two) Times a Day. 180 capsule 3   • famotidine (PEPCID) 40 MG tablet Take 1 tablet by mouth 2 (Two) Times a Day. 180 tablet 3   • hyoscyamine (ANASPAZ,LEVSIN) 0.125 MG tablet Take 1 tablet by mouth Every 8 (Eight) Hours As Needed for abdominal cramping 30 tablet 3   • ipratropium-albuterol (Combivent Respimat)  MCG/ACT inhaler Inhale 1 puff 4 (Four) Times a Day. 12 g 1   • ketotifen (Zaditor) 0.025 % ophthalmic solution Apply 1 drop to eye(s) as directed by provider 2 (Two) Times a Day. 5 mL 5   • mesalamine (DELZICOL) 400 MG capsule delayed-release delayed release capsule Take 1 capsule by mouth Every 8 (Eight) Hours.     • methocarbamol (ROBAXIN) 500 MG tablet Take 1 tablet by mouth Every 6-8  Hours As Needed. 30 tablet 1   • nebivolol (Bystolic) 5 MG tablet Take 1 tablet by mouth Daily. 90 tablet 3   • Probiotic Product (PROBIOTIC ADVANCED PO) Take  by mouth Daily.     • rosuvastatin (Crestor) 10 MG tablet Take 1 tablet by mouth Daily. 90 tablet 3   • vitamin D3 125 MCG (5000 UT) capsule capsule Take  by mouth.     • [DISCONTINUED] albuterol sulfate  (90 Base) MCG/ACT inhaler Inhale 2 puffs Every 4 (Four) Hours As Needed. 18 g 5   • [DISCONTINUED] azelastine (ASTELIN) 0.1 % nasal spray Instill 1-2 sprays into the nostril(s) as directed by provider 2 (Two) Times a Day. 120 mL 5   • [DISCONTINUED] cefuroxime (CEFTIN) 500 MG tablet Every 12 (Twelve) Hours.     • [DISCONTINUED] clopidogrel (PLAVIX) 75 MG tablet Take 1 tablet by mouth Daily. 90 tablet 1   • [DISCONTINUED] esomeprazole (nexIUM) 40 MG capsule Take 40 mg by mouth Take As Directed.     • [DISCONTINUED] famotidine (PEPCID) 40 MG tablet Take 1 tablet by mouth 2 (Two) Times a Day. 180 tablet 3   • [DISCONTINUED] mesalamine (DELZICOL) 400 MG capsule delayed-release delayed release capsule Take 1 capsule by mouth 3 (Three) Times a Day. 90 capsule 6   • [DISCONTINUED] mesalamine  "(DELZICOL) 400 MG capsule delayed-release delayed release capsule Take 1 capsule by mouth 3 (Three) Times a Day. 90 capsule 6   • [DISCONTINUED] mesalamine (DELZICOL) 400 MG capsule delayed-release delayed release capsule Take 2 capsules by mouth 2 (Two) Times a Day. 120 capsule 12   • [DISCONTINUED] mesalamine (Pentasa) 500 MG CR capsule Take 1 capsule by mouth 3 (Three) Times a Day. 270 capsule 4   • [DISCONTINUED] mesalamine (Pentasa) 500 MG CR capsule Take 1 capsule by mouth 3 (Three) Times a Day. 270 capsule 4   • [DISCONTINUED] methocarbamol (ROBAXIN) 500 MG tablet Take 1 tablet by mouth Daily As Needed for Muscle Spasms. 30 tablet 0   • [DISCONTINUED] methylPREDNISolone (MEDROL) 4 MG dose pack Take as directed on inside of package. 21 tablet 0   • [DISCONTINUED] montelukast (Singulair) 10 MG tablet Take 1 tablet by mouth Every Morning. (Patient taking differently: Take 10 mg by mouth Daily As Needed.) 30 tablet 1   • [DISCONTINUED] MULTIPLE VITAMIN PO Take 1 tablet by mouth Take As Directed.     • [DISCONTINUED] risedronate (ACTONEL) 150 MG tablet Take 1 tablet by mouth Every 30 (Thirty) Days. with water on empty stomach, nothing by mouth or lie down for next 30 minutes. 3 tablet 1   • [DISCONTINUED] VITAMIN D, CHOLECALCIFEROL, PO Take 5,000 Units by mouth Take As Directed.       No current facility-administered medications on file prior to visit.       Allergies  Allergies   Allergen Reactions   • Levaquin [Levofloxacin] Other (See Comments)     Patient states she gets really bad plantar fasciitis   • Bactrim [Sulfamethoxazole-Trimethoprim] Itching and Rash   • Beeswax Rash   • Other Itching and Rash     Kapidex   • Sulfa Antibiotics Rash   • Zosyn [Piperacillin Sod-Tazobactam So] Rash        Objective  Visit Vitals  /78   Pulse 69   Temp 98 °F (36.7 °C)   Ht 157.5 cm (62\")   Wt 77.6 kg (171 lb)   LMP  (LMP Unknown)   SpO2 98%   BMI 31.28 kg/m²        Physical Exam  Physical Exam  Vitals and nursing " note reviewed.   Constitutional:       General: She is not in acute distress.     Appearance: She is well-developed. She is not ill-appearing or toxic-appearing.   HENT:      Head: Normocephalic and atraumatic.   Eyes:      Conjunctiva/sclera: Conjunctivae normal.   Cardiovascular:      Rate and Rhythm: Normal rate and regular rhythm.      Heart sounds: Normal heart sounds.   Pulmonary:      Effort: Pulmonary effort is normal. No respiratory distress.      Breath sounds: Normal breath sounds.   Musculoskeletal:      Right lower leg: No edema.      Left lower leg: No edema.   Skin:     General: Skin is warm and dry.      Findings: Lesion (slight hyperpigmentation of skin in circular pattern on both shins. Underlying skin is more firm than surrounding skin.) present.   Neurological:      General: No focal deficit present.      Mental Status: She is alert and oriented to person, place, and time.      Gait: Gait normal.   Psychiatric:         Mood and Affect: Mood normal.         Behavior: Behavior normal.          Results  Results for orders placed or performed in visit on 04/14/23   Vitamin D 25 Hydroxy    Specimen: Blood   Result Value Ref Range    25 Hydroxy, Vitamin D 61.0 30.0 - 100.0 ng/ml   Comprehensive Metabolic Panel    Specimen: Blood   Result Value Ref Range    Glucose 94 65 - 99 mg/dL    BUN 18 8 - 23 mg/dL    Creatinine 0.77 0.57 - 1.00 mg/dL    Sodium 140 136 - 145 mmol/L    Potassium 4.3 3.5 - 5.2 mmol/L    Chloride 102 98 - 107 mmol/L    CO2 26.3 22.0 - 29.0 mmol/L    Calcium 9.5 8.6 - 10.5 mg/dL    Total Protein 6.7 6.0 - 8.5 g/dL    Albumin 4.5 3.5 - 5.2 g/dL    ALT (SGPT) 25 1 - 33 U/L    AST (SGOT) 16 1 - 32 U/L    Alkaline Phosphatase 94 39 - 117 U/L    Total Bilirubin 0.5 0.0 - 1.2 mg/dL    Globulin 2.2 gm/dL    A/G Ratio 2.0 g/dL    BUN/Creatinine Ratio 23.4 7.0 - 25.0    Anion Gap 11.7 5.0 - 15.0 mmol/L    eGFR 88.4 >60.0 mL/min/1.73   CBC Auto Differential    Specimen: Blood   Result Value Ref  Range    WBC 7.53 3.40 - 10.80 10*3/mm3    RBC 4.36 3.77 - 5.28 10*6/mm3    Hemoglobin 12.2 12.0 - 15.9 g/dL    Hematocrit 37.9 34.0 - 46.6 %    MCV 86.9 79.0 - 97.0 fL    MCH 28.0 26.6 - 33.0 pg    MCHC 32.2 31.5 - 35.7 g/dL    RDW 14.7 12.3 - 15.4 %    RDW-SD 46.9 37.0 - 54.0 fl    MPV 8.8 6.0 - 12.0 fL    Platelets 410 140 - 450 10*3/mm3    Neutrophil % 50.0 42.7 - 76.0 %    Lymphocyte % 35.7 19.6 - 45.3 %    Monocyte % 11.3 5.0 - 12.0 %    Eosinophil % 2.3 0.3 - 6.2 %    Basophil % 0.4 0.0 - 1.5 %    Immature Grans % 0.3 0.0 - 0.5 %    Neutrophils, Absolute 3.77 1.70 - 7.00 10*3/mm3    Lymphocytes, Absolute 2.69 0.70 - 3.10 10*3/mm3    Monocytes, Absolute 0.85 0.10 - 0.90 10*3/mm3    Eosinophils, Absolute 0.17 0.00 - 0.40 10*3/mm3    Basophils, Absolute 0.03 0.00 - 0.20 10*3/mm3    Immature Grans, Absolute 0.02 0.00 - 0.05 10*3/mm3    nRBC 0.0 0.0 - 0.2 /100 WBC        Assessment and Plan  Diagnoses and all orders for this visit:    History of asthma  - following with pulmonary  - has inhalers and nebs PRN  - notes frequent need for abx and steroids with respiratory infections.    Crohn's disease of colon without complication  - follows with Dr. 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 Alva, on prolia    Chronic GERD  Hiatal hernia  - hx of barretts as well, need results of last EGD  - on nexium 40mg BID and pepcid 40mg BID    Mixed hyperlipidemia  - on crestor 10mg daily    Mitral valve prolapse  - asymptomatic on nebivolol 5mg daily    Subcutaneous mass  - will obtain an ultrasound of both lower legs    Health Maintenance   Topic Date Due   • COLORECTAL  CANCER SCREENING  Never done   • HEPATITIS C SCREENING  Never done   • ANNUAL PHYSICAL  Never done   • PAP SMEAR  Never done   • Pneumococcal Vaccine 0-64 (3 - PCV) 02/21/2021   • COVID-19 Vaccine (4 - Booster for Pfizer series) 12/10/2021   • INFLUENZA VACCINE  08/01/2023   • LIPID PANEL  10/08/2023   • MAMMOGRAM  02/18/2024   • DXA SCAN  03/11/2024   • TDAP/TD VACCINES (4 - Td or Tdap) 02/21/2030   • ZOSTER VACCINE  Completed     Health Maintenance  - Pap smear: s/p hysterectomy  - Mammogram: discuss next visit  - Colonoscopy: will get records from Dr. Guillen  - HCV: with next labs  - Immunizations: will get records  - Depression screening: negative 5/2023    Return for physical within the next 3 months (any open 30 minutes).

## 2023-05-31 ENCOUNTER — TRANSCRIBE ORDERS (OUTPATIENT)
Dept: MAMMOGRAPHY | Facility: HOSPITAL | Age: 63
End: 2023-05-31

## 2023-05-31 DIAGNOSIS — Z12.31 VISIT FOR SCREENING MAMMOGRAM: Primary | ICD-10-CM

## 2023-06-06 RX ORDER — MESALAMINE 400 MG/1
800 CAPSULE, DELAYED RELEASE ORAL 2 TIMES DAILY
Qty: 120 CAPSULE | Refills: 12 | OUTPATIENT
Start: 2023-06-06

## 2023-06-09 ENCOUNTER — OFFICE VISIT (OUTPATIENT)
Dept: PULMONOLOGY | Facility: CLINIC | Age: 63
End: 2023-06-09
Payer: COMMERCIAL

## 2023-06-09 VITALS
WEIGHT: 170.3 LBS | DIASTOLIC BLOOD PRESSURE: 78 MMHG | TEMPERATURE: 96.4 F | HEIGHT: 62 IN | OXYGEN SATURATION: 99 % | BODY MASS INDEX: 31.34 KG/M2 | HEART RATE: 70 BPM | SYSTOLIC BLOOD PRESSURE: 120 MMHG

## 2023-06-09 DIAGNOSIS — Z87.09 HISTORY OF ASTHMA: Primary | ICD-10-CM

## 2023-06-09 DIAGNOSIS — K44.9 HIATAL HERNIA: ICD-10-CM

## 2023-06-09 DIAGNOSIS — R05.3 CHRONIC COUGH: ICD-10-CM

## 2023-06-09 DIAGNOSIS — Z87.891 FORMER SMOKER: ICD-10-CM

## 2023-06-09 DIAGNOSIS — K21.9 CHRONIC GERD: ICD-10-CM

## 2023-06-09 NOTE — PROGRESS NOTES
PULMONARY  NOTE    Chief Complaint     Episodic coughing, GERD, hiatal hernia, Crohn's, dyspnea on exertion, recurrent bronchitis    History of Present Illness     62-year-old female returns today for follow-up  Last saw her about a year ago    She has a minimal smoking history  She has a history of asthma has been on a variety of inhalers in the past    She typically had had frequent episodes of bronchitis resulting in 1-2 courses of antibiotics or steroids a year  However since I last saw her she has had no acute respiratory tract infections  No bronchitis or pneumonia    She does have a history of Crohn's disease but no recent flare  Also has a history of reflux and hiatal hernia    She has noted some dyspnea on exertion periodically but not on a regular basis    Patient Active Problem List   Diagnosis    Venous stasis    Infarction of spleen    Osteoporosis    Mixed hyperlipidemia    Mitral valve prolapse    Acquired asplenia    Chronic GERD    Paroxysmal cough    Frequent episodes of bronchitis    Remote, minimal smoker    History of asthma    Crohn's disease of colon without complication    Environmental allergies    Dyshidrotic eczema    History of blood clots - splenic artery    Hiatal hernia    History of compression fracture of spine    Perez's esophagus    Prediabetes     Allergies   Allergen Reactions    Levaquin [Levofloxacin] Other (See Comments)     Patient states she gets really bad plantar fasciitis    Vancomycin Other (See Comments)     Red man syndrome    Bactrim [Sulfamethoxazole-Trimethoprim] Itching and Rash    Beeswax Rash    Other Itching and Rash     Kapidex    Zosyn [Piperacillin Sod-Tazobactam So] Rash       Current Outpatient Medications:     albuterol (PROVENTIL) (2.5 MG/3ML) 0.083% nebulizer solution, Take 2.5 mg by nebulization 4 (Four) Times a Day As Needed., Disp: 90 mL, Rfl: 1    albuterol sulfate  (90 Base) MCG/ACT inhaler, Inhale 2 puffs Every 4 (Four) Hours As Needed.,  Disp: 18 g, Rfl: 5    azelastine (ASTEPRO) 0.15 % solution nasal spray, 1 spray into the nostril(s) as directed by provider 2 (Two) Times a Day., Disp: 30 mL, Rfl: 10    betamethasone, augmented, (DIPROLENE) 0.05 % cream, Apply topically to the appropriate area as directed 2 (Two) Times a Day for 14 days, then decrease to 2-3 times weekly, Disp: 50 g, Rfl: 1    Calcium Polycarbophil (FIBER-CAPS PO), Take  by mouth Daily., Disp: , Rfl:     clopidogrel (PLAVIX) 75 MG tablet, Take 1 tablet by mouth Daily., Disp: 90 tablet, Rfl: 3    Coenzyme Q10 (COQ10) 200 MG capsule, Take 1 capsule by mouth Take As Directed., Disp: , Rfl:     denosumab (PROLIA) 60 MG/ML solution prefilled syringe syringe, Inject  under the skin into the appropriate area as directed., Disp: , Rfl:     denosumab (Prolia) 60 MG/ML solution prefilled syringe syringe, Inject 1 mL under the skin into the appropriate area (upper are, upper thigh, or abdomen) as directed every 6 months, Disp: 1 mL, Rfl: 1    Dietary Management Product (Vasculera) tablet, Vasculera, Disp: , Rfl:     esomeprazole (nexIUM) 40 MG capsule, Take 1 capsule by mouth 2 (Two) Times a Day., Disp: 180 capsule, Rfl: 3    famotidine (PEPCID) 40 MG tablet, Take 1 tablet by mouth 2 (Two) Times a Day., Disp: 180 tablet, Rfl: 3    hyoscyamine (ANASPAZ,LEVSIN) 0.125 MG tablet, Take 1 tablet by mouth Every 8 (Eight) Hours As Needed for abdominal cramping, Disp: 30 tablet, Rfl: 3    ipratropium-albuterol (Combivent Respimat)  MCG/ACT inhaler, Inhale 1 puff 4 (Four) Times a Day., Disp: 12 g, Rfl: 1    ketotifen (Zaditor) 0.025 % ophthalmic solution, Apply 1 drop to eye(s) as directed by provider 2 (Two) Times a Day., Disp: 5 mL, Rfl: 5    mesalamine (DELZICOL) 400 MG capsule delayed-release delayed release capsule, Take 1 capsule by mouth Every 8 (Eight) Hours., Disp: , Rfl:     mesalamine (DELZICOL) 400 MG capsule delayed-release delayed release capsule, Take 2 capsules by mouth 2 (Two)  "Times a Day., Disp: 360 capsule, Rfl: 0    methocarbamol (ROBAXIN) 500 MG tablet, Take 1 tablet by mouth Every 6-8  Hours As Needed., Disp: 30 tablet, Rfl: 1    nebivolol (Bystolic) 5 MG tablet, Take 1 tablet by mouth Daily., Disp: 90 tablet, Rfl: 3    NIACIN PO, Take  by mouth., Disp: , Rfl:     Probiotic Product (PROBIOTIC ADVANCED PO), Take  by mouth Daily., Disp: , Rfl:     rosuvastatin (Crestor) 10 MG tablet, Take 1 tablet by mouth Daily., Disp: 90 tablet, Rfl: 3    vitamin D3 125 MCG (5000 UT) capsule capsule, Take  by mouth., Disp: , Rfl:   MEDICATION LIST AND ALLERGIES REVIEWED.    Family History   Problem Relation Age of Onset    Heart attack Mother     Hypertension Mother     Stroke Mother     Hyperlipidemia Mother     Stroke Father     Hypertension Brother     Stroke Brother     Arthritis Maternal Grandmother     Heart disease Maternal Grandmother     Heart failure Maternal Grandmother     Dementia Maternal Uncle     Macular degeneration Maternal Uncle     Breast cancer Neg Hx     Ovarian cancer Neg Hx      Social History     Tobacco Use    Smoking status: Former     Packs/day: 0.50     Years: 10.00     Pack years: 5.00     Types: Cigarettes     Quit date:      Years since quittin.4    Smokeless tobacco: Never   Vaping Use    Vaping Use: Never used   Substance Use Topics    Alcohol use: No     Comment: never a heavy drinker    Drug use: No     Social History     Social History Narrative    , works as a nurse at  [employer]    Has about a 10-pack-year smoking history, none since     Does not drink alcohol    No vaping     FAMILY AND SOCIAL HISTORY REVIEWED.    Review of Systems  IF PRESENT REFER TO SCANNED ROS SHEET FROM SAME DATE  OTHERWISE ROS OBTAINED AND NON-CONTRIBUTORY OVER HPI.    /78   Pulse 70   Temp 96.4 °F (35.8 °C) (Temporal)   Ht 157.5 cm (62.01\")   Wt 77.2 kg (170 lb 4.8 oz)   LMP  (LMP Unknown)   SpO2 99% Comment: Resting at room air  BMI 31.14 kg/m²   Physical " Exam  Vitals and nursing note reviewed.   Constitutional:       General: She is not in acute distress.     Appearance: She is well-developed. She is not diaphoretic.   HENT:      Head: Normocephalic and atraumatic.   Neck:      Thyroid: No thyromegaly.   Cardiovascular:      Rate and Rhythm: Normal rate and regular rhythm.      Heart sounds: Murmur heard.   Pulmonary:      Effort: Pulmonary effort is normal.      Breath sounds: Normal breath sounds. No stridor.   Lymphadenopathy:      Cervical: No cervical adenopathy.      Upper Body:      Right upper body: No supraclavicular or epitrochlear adenopathy.      Left upper body: No supraclavicular or epitrochlear adenopathy.   Skin:     General: Skin is warm and dry.   Neurological:      Mental Status: She is alert and oriented to person, place, and time.   Psychiatric:         Behavior: Behavior normal.       Results     Echocardiogram from 6/24/2022 reviewed  Preserved left ventricular systolic function  Borderline concentric LVH  RVSP less than 30    PFTs today reveal no airway obstruction, no restriction, and a normal diffusion capacity    Immunization History   Administered Date(s) Administered    COVID-19 (PFIZER) Purple Cap Monovalent 01/05/2021, 01/26/2021, 10/15/2021    Fluzone Quad >6mos (Multi-dose) 01/20/2012, 09/20/2013, 10/01/2015, 10/01/2017, 10/01/2019, 10/27/2021    Hepatitis A 02/21/2020, 05/14/2021    INFLUENZA SPLIT TRI 10/14/2021    Influenza, Unspecified 10/01/2022    Pneumococcal Polysaccharide (PPSV23) 01/22/2010, 01/22/2010, 02/21/2020    Shingrix 05/14/2021, 11/19/2021    Tdap 01/25/2010, 01/25/2010, 02/21/2020     Problem List       ICD-10-CM ICD-9-CM   1. History of asthma  Z87.09 V12.69   2. Chronic cough  R05.3 786.2   3. Remote, minimal smoker  Z87.891 V15.82   4. Hiatal hernia  K44.9 553.3   5. Chronic GERD  K21.9 530.81       Discussion     We reviewed her test results  We reviewed the echocardiogram from last year and the PFTs from this  year    She does have some dyspnea periodically  She does have a history of asthma and has been on inhalers in the past  Spirometry today is normal which of course does not exclude asthma    Did not have evidence of pulmonary hypertension on an echocardiogram last year  Does not have evidence of lower extremity edema, etc. on exam today    We talked about further work-up such as a asthma stress test or cardiopulmonary exercise test    At this point she just wants to see how she does and follow-up    At the very least I will see her back in a year or earlier if her symptoms seem to progress  We might consider getting a repeat transthoracic echocardiogram next year in addition to PFTs    Moderate level of Medical Decision Making complexity based on 1 undiagnosed new problem or 2 stable chronic conditions, independent interpretation of tests, and/or prescription drug management     Umang Rainey MD  Note electronically signed    CC: Jennifer Maurer MD

## 2023-07-27 ENCOUNTER — PATIENT MESSAGE (OUTPATIENT)
Dept: INTERNAL MEDICINE | Facility: CLINIC | Age: 63
End: 2023-07-27
Payer: COMMERCIAL

## 2023-07-27 RX ORDER — CLOPIDOGREL BISULFATE 75 MG/1
75 TABLET ORAL DAILY
Qty: 90 TABLET | Refills: 1 | Status: SHIPPED | OUTPATIENT
Start: 2023-07-27

## 2023-07-27 RX ORDER — ROSUVASTATIN CALCIUM 10 MG/1
10 TABLET, COATED ORAL DAILY
Qty: 90 TABLET | Refills: 1 | Status: SHIPPED | OUTPATIENT
Start: 2023-07-27

## 2023-07-27 RX ORDER — NEBIVOLOL 5 MG/1
5 TABLET ORAL DAILY
Qty: 90 TABLET | Refills: 1 | Status: SHIPPED | OUTPATIENT
Start: 2023-07-27

## 2023-07-27 RX ORDER — ESOMEPRAZOLE MAGNESIUM 40 MG/1
40 CAPSULE, DELAYED RELEASE ORAL 2 TIMES DAILY
Qty: 180 CAPSULE | Refills: 1 | Status: SHIPPED | OUTPATIENT
Start: 2023-07-27

## 2023-07-27 RX ORDER — FAMOTIDINE 40 MG/1
40 TABLET, FILM COATED ORAL 2 TIMES DAILY
Qty: 180 TABLET | Refills: 1 | Status: SHIPPED | OUTPATIENT
Start: 2023-07-27

## 2023-07-27 NOTE — TELEPHONE ENCOUNTER
From: Antonieta Howe  To: Jennifer Maurer  Sent: 7/27/2023 7:19 AM EDT  Subject: Medication refills     My medications were prescribed by my previous doctor and I am out of refills. My next appointment with you is not until 8/18/23. I need refills of Plavix, Crestor, Bystolic, Nexium, and Pepcid. I would appreciate it if you could send those to the pharmacy at [facility]. If you could do a 90 day supply that would be great. Thank you so much!

## 2023-08-18 ENCOUNTER — OFFICE VISIT (OUTPATIENT)
Dept: INTERNAL MEDICINE | Facility: CLINIC | Age: 63
End: 2023-08-18
Payer: COMMERCIAL

## 2023-08-18 ENCOUNTER — LAB (OUTPATIENT)
Dept: LAB | Facility: HOSPITAL | Age: 63
End: 2023-08-18
Payer: COMMERCIAL

## 2023-08-18 VITALS
HEIGHT: 62 IN | WEIGHT: 171 LBS | SYSTOLIC BLOOD PRESSURE: 114 MMHG | TEMPERATURE: 97.7 F | DIASTOLIC BLOOD PRESSURE: 72 MMHG | OXYGEN SATURATION: 96 % | HEART RATE: 58 BPM | BODY MASS INDEX: 31.47 KG/M2

## 2023-08-18 DIAGNOSIS — E78.2 MIXED HYPERLIPIDEMIA: ICD-10-CM

## 2023-08-18 DIAGNOSIS — K21.9 CHRONIC GERD: ICD-10-CM

## 2023-08-18 DIAGNOSIS — Z86.718 HISTORY OF BLOOD CLOTS: ICD-10-CM

## 2023-08-18 DIAGNOSIS — Z00.00 ANNUAL PHYSICAL EXAM: Primary | ICD-10-CM

## 2023-08-18 DIAGNOSIS — R73.03 PREDIABETES: ICD-10-CM

## 2023-08-18 DIAGNOSIS — I34.1 MITRAL VALVE PROLAPSE: ICD-10-CM

## 2023-08-18 DIAGNOSIS — Z13.29 SCREENING FOR THYROID DISORDER: ICD-10-CM

## 2023-08-18 DIAGNOSIS — L30.1 DYSHIDROTIC ECZEMA: ICD-10-CM

## 2023-08-18 DIAGNOSIS — J45.20 MILD INTERMITTENT ASTHMA WITHOUT COMPLICATION: ICD-10-CM

## 2023-08-18 DIAGNOSIS — M81.0 OSTEOPOROSIS, UNSPECIFIED OSTEOPOROSIS TYPE, UNSPECIFIED PATHOLOGICAL FRACTURE PRESENCE: ICD-10-CM

## 2023-08-18 DIAGNOSIS — Z23 NEED FOR VACCINATION: ICD-10-CM

## 2023-08-18 DIAGNOSIS — K50.10 CROHN'S DISEASE OF COLON WITHOUT COMPLICATION: ICD-10-CM

## 2023-08-18 DIAGNOSIS — Z87.81 HISTORY OF COMPRESSION FRACTURE OF SPINE: ICD-10-CM

## 2023-08-18 DIAGNOSIS — Z11.59 ENCOUNTER FOR HEPATITIS C SCREENING TEST FOR LOW RISK PATIENT: ICD-10-CM

## 2023-08-18 DIAGNOSIS — K44.9 HIATAL HERNIA: ICD-10-CM

## 2023-08-18 DIAGNOSIS — Z91.09 ENVIRONMENTAL ALLERGIES: ICD-10-CM

## 2023-08-18 DIAGNOSIS — Z90.81 ACQUIRED ASPLENIA: ICD-10-CM

## 2023-08-18 DIAGNOSIS — Z00.00 ANNUAL PHYSICAL EXAM: ICD-10-CM

## 2023-08-18 LAB
ALBUMIN SERPL-MCNC: 4.6 G/DL (ref 3.5–5.2)
ALBUMIN/GLOB SERPL: 2 G/DL
ALP SERPL-CCNC: 95 U/L (ref 39–117)
ALT SERPL W P-5'-P-CCNC: 21 U/L (ref 1–33)
ANION GAP SERPL CALCULATED.3IONS-SCNC: 11.5 MMOL/L (ref 5–15)
AST SERPL-CCNC: 24 U/L (ref 1–32)
BILIRUB SERPL-MCNC: 0.6 MG/DL (ref 0–1.2)
BUN SERPL-MCNC: 15 MG/DL (ref 8–23)
BUN/CREAT SERPL: 20.8 (ref 7–25)
CALCIUM SPEC-SCNC: 9.6 MG/DL (ref 8.6–10.5)
CHLORIDE SERPL-SCNC: 102 MMOL/L (ref 98–107)
CHOLEST SERPL-MCNC: 197 MG/DL (ref 0–200)
CO2 SERPL-SCNC: 27.5 MMOL/L (ref 22–29)
CREAT SERPL-MCNC: 0.72 MG/DL (ref 0.57–1)
DEPRECATED RDW RBC AUTO: 44.2 FL (ref 37–54)
EGFRCR SERPLBLD CKD-EPI 2021: 95.9 ML/MIN/1.73
ERYTHROCYTE [DISTWIDTH] IN BLOOD BY AUTOMATED COUNT: 14.4 % (ref 12.3–15.4)
GLOBULIN UR ELPH-MCNC: 2.3 GM/DL
GLUCOSE SERPL-MCNC: 92 MG/DL (ref 65–99)
HBA1C MFR BLD: 6.4 % (ref 4.8–5.6)
HCT VFR BLD AUTO: 37.5 % (ref 34–46.6)
HDLC SERPL-MCNC: 69 MG/DL (ref 40–60)
HGB BLD-MCNC: 12.1 G/DL (ref 12–15.9)
LDLC SERPL CALC-MCNC: 105 MG/DL (ref 0–100)
LDLC/HDLC SERPL: 1.48 {RATIO}
MAGNESIUM SERPL-MCNC: 2.2 MG/DL (ref 1.6–2.4)
MCH RBC QN AUTO: 27.4 PG (ref 26.6–33)
MCHC RBC AUTO-ENTMCNC: 32.3 G/DL (ref 31.5–35.7)
MCV RBC AUTO: 84.8 FL (ref 79–97)
PHOSPHATE SERPL-MCNC: 3.8 MG/DL (ref 2.5–4.5)
PLATELET # BLD AUTO: 407 10*3/MM3 (ref 140–450)
PMV BLD AUTO: 8.6 FL (ref 6–12)
POTASSIUM SERPL-SCNC: 3.7 MMOL/L (ref 3.5–5.2)
PROT SERPL-MCNC: 6.9 G/DL (ref 6–8.5)
RBC # BLD AUTO: 4.42 10*6/MM3 (ref 3.77–5.28)
SODIUM SERPL-SCNC: 141 MMOL/L (ref 136–145)
TRIGL SERPL-MCNC: 131 MG/DL (ref 0–150)
TSH SERPL DL<=0.05 MIU/L-ACNC: 1.8 UIU/ML (ref 0.27–4.2)
VLDLC SERPL-MCNC: 23 MG/DL (ref 5–40)
WBC NRBC COR # BLD: 7.44 10*3/MM3 (ref 3.4–10.8)

## 2023-08-18 PROCEDURE — 82306 VITAMIN D 25 HYDROXY: CPT

## 2023-08-18 PROCEDURE — 86803 HEPATITIS C AB TEST: CPT

## 2023-08-18 PROCEDURE — 80061 LIPID PANEL: CPT

## 2023-08-18 PROCEDURE — 80050 GENERAL HEALTH PANEL: CPT

## 2023-08-18 PROCEDURE — 84100 ASSAY OF PHOSPHORUS: CPT

## 2023-08-18 PROCEDURE — 83735 ASSAY OF MAGNESIUM: CPT

## 2023-08-18 PROCEDURE — 83036 HEMOGLOBIN GLYCOSYLATED A1C: CPT

## 2023-08-18 NOTE — PROGRESS NOTES
Internal Medicine Annual Exam  Antonieta Howe is a 62 y.o. female who presents today for an annual exam and with concerns as outlined below.    Chief Complaint  Chief Complaint   Patient presents with    Annual Exam     Ongoing back pain        HPI  Ms. Howe comes in today for her physical. She is doing well overall. Notes chronic midline back pain just beneath bra strap. She notes pain is intermittent and can occur with overuse of her arms or with direct pressure. She has a hx of T7 compression fx in 2015. Cannot use NSAIDs due to crohns. She does use robaxin PRN but limited by drowsiness. She otherwise denies any changes to her medications. She has been doing well. She is up to date with her colonoscopy, mammogram, GYN exam. She does plan to get her flu shot and we discussed COVID booster and RSV vaccines. Denies use of tobacco, ecigarettes, illicit drugs, and alcohol.         Review of Systems  Review of Systems   Constitutional: Negative.    Respiratory: Negative.     Cardiovascular:  Positive for leg swelling (intermittently). Negative for chest pain and palpitations.   Gastrointestinal:  Positive for blood in stool (occasional).   Genitourinary: Negative.    Musculoskeletal:  Positive for back pain.   Skin: Negative.    Neurological: Negative.    Psychiatric/Behavioral: Negative.        Past Medical History  Past Medical History:   Diagnosis Date    Achilles tendonitis     Diverticulitis 2019    Esophagitis     Gastrointestinal disorder     Hepatitis     Hyperlipidemia     Osteoarthritis     Osteoporosis     Plantar fasciitis     Skin disorder     Splenic infarct         Surgical History  Past Surgical History:   Procedure Laterality Date     SECTION      CHOLECYSTECTOMY  2004    HYSTERECTOMY      LAPAROSCOPIC TUBAL LIGATION      LAPAROSCOPY DIAGNOSTIC / BIOPSY / ASPIRATION / LYSIS      diagnostic laparoscopy for recurrent pregnancy loss    OOPHORECTOMY      SPLENECTOMY      splenic  infarct        Family History  Family History   Problem Relation Age of Onset    Heart attack Mother     Hypertension Mother     Stroke Mother     Hyperlipidemia Mother     Stroke Father     Hypertension Brother     Stroke Brother     Arthritis Maternal Grandmother     Heart disease Maternal Grandmother     Heart failure Maternal Grandmother     Dementia Maternal Uncle     Macular degeneration Maternal Uncle     Breast cancer Neg Hx     Ovarian cancer Neg Hx         Social History  Social History     Socioeconomic History    Marital status:    Tobacco Use    Smoking status: Former     Packs/day: 0.50     Years: 10.00     Pack years: 5.00     Types: Cigarettes     Quit date:      Years since quittin.6    Smokeless tobacco: Never   Vaping Use    Vaping Use: Never used   Substance and Sexual Activity    Alcohol use: No     Comment: never a heavy drinker    Drug use: No    Sexual activity: Defer        Current Medications  Current Outpatient Medications on File Prior to Visit   Medication Sig Dispense Refill    albuterol (PROVENTIL) (2.5 MG/3ML) 0.083% nebulizer solution Take 2.5 mg by nebulization 4 (Four) Times a Day As Needed. 90 mL 1    albuterol sulfate  (90 Base) MCG/ACT inhaler Inhale 2 puffs Every 4 (Four) Hours As Needed. 18 g 5    azelastine (ASTEPRO) 0.15 % solution nasal spray 1 spray into the nostril(s) as directed by provider 2 (Two) Times a Day. 30 mL 10    betamethasone, augmented, (DIPROLENE) 0.05 % cream Apply topically to the appropriate area as directed 2 (Two) Times a Day for 14 days, then decrease to 2-3 times weekly 50 g 1    Calcium Polycarbophil (FIBER-CAPS PO) Take  by mouth Daily.      clopidogrel (PLAVIX) 75 MG tablet Take 1 tablet by mouth Daily. 90 tablet 1    Coenzyme Q10 (COQ10) 200 MG capsule Take 1 capsule by mouth Take As Directed.      denosumab (PROLIA) 60 MG/ML solution prefilled syringe syringe Inject  under the skin into the appropriate area as directed.       denosumab (Prolia) 60 MG/ML solution prefilled syringe syringe Inject 1 mL under the skin into the appropriate area (upper arm, upper thigh, or abdomen) as directed every 6 months 1 mL 1    Dietary Management Product (Vasculera) tablet Vasculera      esomeprazole (nexIUM) 40 MG capsule Take 1 capsule by mouth 2 (Two) Times a Day. 180 capsule 1    famotidine (PEPCID) 40 MG tablet Take 1 tablet by mouth 2 (Two) Times a Day. 180 tablet 1    hyoscyamine (ANASPAZ,LEVSIN) 0.125 MG tablet Take 1 tablet by mouth Every 8 (Eight) Hours As Needed for abdominal cramping 30 tablet 3    ipratropium-albuterol (Combivent Respimat)  MCG/ACT inhaler Inhale 1 puff 4 (Four) Times a Day. 12 g 1    ketotifen (Zaditor) 0.025 % ophthalmic solution Apply 1 drop to eye(s) as directed by provider 2 (Two) Times a Day. 5 mL 5    mesalamine (DELZICOL) 400 MG capsule delayed-release delayed release capsule Take 1 capsule by mouth Every 8 (Eight) Hours.      mesalamine (DELZICOL) 400 MG capsule delayed-release delayed release capsule Take 2 capsules by mouth 2 (Two) Times a Day. 360 capsule 0    methocarbamol (ROBAXIN) 500 MG tablet Take 1 tablet by mouth Every 6-8  Hours As Needed. 30 tablet 1    nebivolol (Bystolic) 5 MG tablet Take 1 tablet by mouth Daily. 90 tablet 1    NIACIN PO Take  by mouth.      Probiotic Product (PROBIOTIC ADVANCED PO) Take  by mouth Daily.      rosuvastatin (Crestor) 10 MG tablet Take 1 tablet by mouth Daily. 90 tablet 1    vitamin D3 125 MCG (5000 UT) capsule capsule Take  by mouth.      [DISCONTINUED] montelukast (Singulair) 10 MG tablet Take 1 tablet by mouth Every Morning. (Patient taking differently: Take 10 mg by mouth Daily As Needed.) 30 tablet 1     No current facility-administered medications on file prior to visit.       Allergies  Allergies   Allergen Reactions    Levaquin [Levofloxacin] Other (See Comments)     Patient states she gets really bad plantar fasciitis    Vancomycin Other (See Comments)  "    Red man syndrome    Bactrim [Sulfamethoxazole-Trimethoprim] Itching and Rash    Beeswax Rash    Other Itching and Rash     Kapidex    Zosyn [Piperacillin Sod-Tazobactam So] Rash        Objective  Visit Vitals  /72   Pulse 58   Temp 97.7 øF (36.5 øC)   Ht 157.5 cm (62\")   Wt 77.6 kg (171 lb)   LMP  (LMP Unknown)   SpO2 96%   BMI 31.28 kg/mý        Physical Exam  Physical Exam  Vitals and nursing note reviewed.   Constitutional:       General: She is not in acute distress.     Appearance: She is well-developed. She is not ill-appearing, toxic-appearing or diaphoretic.   HENT:      Head: Normocephalic and atraumatic.      Right Ear: Tympanic membrane, ear canal and external ear normal.      Left Ear: Tympanic membrane, ear canal and external ear normal.      Nose: Nose normal.   Eyes:      General: No scleral icterus.     Conjunctiva/sclera: Conjunctivae normal.   Cardiovascular:      Rate and Rhythm: Normal rate and regular rhythm.      Heart sounds: Normal heart sounds. No murmur heard.  Pulmonary:      Effort: Pulmonary effort is normal. No respiratory distress.      Breath sounds: Normal breath sounds.   Abdominal:      General: There is no distension.      Palpations: Abdomen is soft. There is no mass.      Tenderness: There is no abdominal tenderness.   Musculoskeletal:         General: Tenderness (mid back over spine) present. No deformity.      Cervical back: Neck supple.      Right lower leg: No edema.      Left lower leg: No edema.   Lymphadenopathy:      Cervical: No cervical adenopathy.   Skin:     General: Skin is warm and dry.      Findings: No rash.   Neurological:      Mental Status: She is alert and oriented to person, place, and time. Mental status is at baseline.      Gait: Gait normal.   Psychiatric:         Mood and Affect: Mood normal.         Behavior: Behavior normal.         Thought Content: Thought content normal.         Judgment: Judgment normal.        Results  Results for orders " placed or performed in visit on 04/14/23   Vitamin D 25 Hydroxy    Specimen: Blood   Result Value Ref Range    25 Hydroxy, Vitamin D 61.0 30.0 - 100.0 ng/ml   Comprehensive Metabolic Panel    Specimen: Blood   Result Value Ref Range    Glucose 94 65 - 99 mg/dL    BUN 18 8 - 23 mg/dL    Creatinine 0.77 0.57 - 1.00 mg/dL    Sodium 140 136 - 145 mmol/L    Potassium 4.3 3.5 - 5.2 mmol/L    Chloride 102 98 - 107 mmol/L    CO2 26.3 22.0 - 29.0 mmol/L    Calcium 9.5 8.6 - 10.5 mg/dL    Total Protein 6.7 6.0 - 8.5 g/dL    Albumin 4.5 3.5 - 5.2 g/dL    ALT (SGPT) 25 1 - 33 U/L    AST (SGOT) 16 1 - 32 U/L    Alkaline Phosphatase 94 39 - 117 U/L    Total Bilirubin 0.5 0.0 - 1.2 mg/dL    Globulin 2.2 gm/dL    A/G Ratio 2.0 g/dL    BUN/Creatinine Ratio 23.4 7.0 - 25.0    Anion Gap 11.7 5.0 - 15.0 mmol/L    eGFR 88.4 >60.0 mL/min/1.73   CBC Auto Differential    Specimen: Blood   Result Value Ref Range    WBC 7.53 3.40 - 10.80 10*3/mm3    RBC 4.36 3.77 - 5.28 10*6/mm3    Hemoglobin 12.2 12.0 - 15.9 g/dL    Hematocrit 37.9 34.0 - 46.6 %    MCV 86.9 79.0 - 97.0 fL    MCH 28.0 26.6 - 33.0 pg    MCHC 32.2 31.5 - 35.7 g/dL    RDW 14.7 12.3 - 15.4 %    RDW-SD 46.9 37.0 - 54.0 fl    MPV 8.8 6.0 - 12.0 fL    Platelets 410 140 - 450 10*3/mm3    Neutrophil % 50.0 42.7 - 76.0 %    Lymphocyte % 35.7 19.6 - 45.3 %    Monocyte % 11.3 5.0 - 12.0 %    Eosinophil % 2.3 0.3 - 6.2 %    Basophil % 0.4 0.0 - 1.5 %    Immature Grans % 0.3 0.0 - 0.5 %    Neutrophils, Absolute 3.77 1.70 - 7.00 10*3/mm3    Lymphocytes, Absolute 2.69 0.70 - 3.10 10*3/mm3    Monocytes, Absolute 0.85 0.10 - 0.90 10*3/mm3    Eosinophils, Absolute 0.17 0.00 - 0.40 10*3/mm3    Basophils, Absolute 0.03 0.00 - 0.20 10*3/mm3    Immature Grans, Absolute 0.02 0.00 - 0.05 10*3/mm3    nRBC 0.0 0.0 - 0.2 /100 WBC        Assessment and Plan  Diagnoses and all orders for this visit:    Annual physical exam  - Counseling was given to patient for the following topics:  importance of self  breast exam and breast health, importance of immunizations, including risks and benefits, and bone health. Also discussed the importance of regular dental and vision care.  -     CBC (No Diff); Future  -     Comprehensive Metabolic Panel; Future    Mild intermittent asthma without complication  - following with pulmonary  - has inhalers and nebs PRN  - notes frequent need for abx and steroids with respiratory infections.     Crohn's disease of colon without complication  - follows with Dr. 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 Alva, improving on prolia     Chronic GERD  Hiatal hernia  - hx of barretts as well  - on nexium 40mg BID and pepcid 40mg BID     Mixed hyperlipidemia  - on crestor 10mg daily  - lipid panel ordered    Mitral valve prolapse  - asymptomatic on nebivolol 5mg daily     Prediabetes  - hx noted but no recent A1c to confirm. Will obtain A1c.    History of compression fracture of spine  - Hx of T7 compression fx in 2017 related to osteoporosis  - has intermittent pain in thoracic spine and treats with robaxin when able however it makes her drowsy. Avoids NSAIDs due to crohns. Recommend trial of lidocaine patches PRN.    Screening for thyroid disorder  -     TSH Rfx On Abnormal To Free T4; Future    Encounter for hepatitis C screening test for low risk patient  -     Hepatitis C Antibody; Future    Need for vaccination  -     Pneumococcal Conjugate Vaccine 20-Valent (PCV20)       Health Maintenance   Topic Date Due    COLORECTAL CANCER SCREENING  Never done    HEPATITIS C SCREENING  Never done    ANNUAL PHYSICAL  Never done    PAP SMEAR   Never done    Pneumococcal Vaccine 0-64 (3 - PCV) 02/21/2021    COVID-19 Vaccine (4 - Pfizer series) 12/10/2021    INFLUENZA VACCINE  10/01/2023    LIPID PANEL  10/08/2023    DXA SCAN  03/11/2024    MAMMOGRAM  06/23/2024    TDAP/TD VACCINES (4 - Td or Tdap) 02/21/2030    ZOSTER VACCINE  Completed     Health Maintenance  - Pap smear: s/p hysterectomy  - Mammogram: 6/2023 BIRADS 1  - Colonoscopy: will get records from Dr. Guillen  - HCV: ordered  - Immunizations: PCV20 today. COVID, flu, RSV discussed. Shingrix complete. Tdap 2/2020.  - Depression screening: negative 5/2023    Return in about 6 months (around 2/18/2024) for Follow up, 1 year for annual, Labs today.

## 2023-08-19 LAB
25(OH)D3 SERPL-MCNC: 57.3 NG/ML (ref 30–100)
HCV AB SER DONR QL: NORMAL

## 2023-11-01 DIAGNOSIS — J06.9 VIRAL UPPER RESPIRATORY TRACT INFECTION: Primary | ICD-10-CM

## 2023-11-01 RX ORDER — METHYLPREDNISOLONE 4 MG/1
TABLET ORAL
Qty: 21 TABLET | Refills: 0 | Status: SHIPPED | OUTPATIENT
Start: 2023-11-01

## 2023-11-01 RX ORDER — AMOXICILLIN AND CLAVULANATE POTASSIUM 875; 125 MG/1; MG/1
1 TABLET, FILM COATED ORAL 2 TIMES DAILY
Qty: 14 TABLET | Refills: 0 | Status: SHIPPED | OUTPATIENT
Start: 2023-11-01

## 2024-02-06 DIAGNOSIS — Z86.718 HISTORY OF BLOOD CLOTS: Primary | ICD-10-CM

## 2024-02-06 RX ORDER — ROSUVASTATIN CALCIUM 10 MG/1
10 TABLET, COATED ORAL DAILY
Qty: 90 TABLET | Refills: 1 | Status: SHIPPED | OUTPATIENT
Start: 2024-02-06

## 2024-02-06 RX ORDER — NEBIVOLOL 5 MG/1
5 TABLET ORAL DAILY
Qty: 90 TABLET | Refills: 1 | Status: SHIPPED | OUTPATIENT
Start: 2024-02-06

## 2024-02-06 RX ORDER — CLOPIDOGREL BISULFATE 75 MG/1
75 TABLET ORAL DAILY
Qty: 90 TABLET | Refills: 1 | Status: SHIPPED | OUTPATIENT
Start: 2024-02-06

## 2024-02-23 ENCOUNTER — LAB (OUTPATIENT)
Dept: LAB | Facility: HOSPITAL | Age: 64
End: 2024-02-23
Payer: COMMERCIAL

## 2024-02-23 ENCOUNTER — OFFICE VISIT (OUTPATIENT)
Dept: INTERNAL MEDICINE | Facility: CLINIC | Age: 64
End: 2024-02-23
Payer: COMMERCIAL

## 2024-02-23 VITALS
WEIGHT: 171.4 LBS | TEMPERATURE: 98 F | DIASTOLIC BLOOD PRESSURE: 80 MMHG | HEART RATE: 79 BPM | SYSTOLIC BLOOD PRESSURE: 126 MMHG | HEIGHT: 62 IN | BODY MASS INDEX: 31.54 KG/M2 | OXYGEN SATURATION: 98 % | RESPIRATION RATE: 16 BRPM

## 2024-02-23 DIAGNOSIS — L30.9 ECZEMA OF SCALP: ICD-10-CM

## 2024-02-23 DIAGNOSIS — I34.1 MITRAL VALVE PROLAPSE: ICD-10-CM

## 2024-02-23 DIAGNOSIS — E78.2 MIXED HYPERLIPIDEMIA: ICD-10-CM

## 2024-02-23 DIAGNOSIS — R73.03 PREDIABETES: Primary | ICD-10-CM

## 2024-02-23 DIAGNOSIS — R73.03 PREDIABETES: ICD-10-CM

## 2024-02-23 DIAGNOSIS — R07.89 ATYPICAL CHEST PAIN: ICD-10-CM

## 2024-02-23 PROCEDURE — 99214 OFFICE O/P EST MOD 30 MIN: CPT | Performed by: INTERNAL MEDICINE

## 2024-02-23 PROCEDURE — 83036 HEMOGLOBIN GLYCOSYLATED A1C: CPT

## 2024-02-23 PROCEDURE — 80061 LIPID PANEL: CPT

## 2024-02-23 PROCEDURE — 93000 ELECTROCARDIOGRAM COMPLETE: CPT | Performed by: INTERNAL MEDICINE

## 2024-02-23 RX ORDER — FLUOCINOLONE ACETONIDE 0.01 %
1 KIT TOPICAL DAILY PRN
Qty: 120 ML | Refills: 0 | Status: SHIPPED | OUTPATIENT
Start: 2024-02-23

## 2024-02-23 NOTE — PROGRESS NOTES
Internal Medicine Follow Up    Chief Complaint  Antonieta Howe is a 63 y.o. female who presents today for follow up of chronic medical conditions outlined below.    Chief Complaint   Patient presents with    Follow-up     6 mo f/u of chronic medical conditions     Prediabetes    Hyperlipidemia        HPI  Ms. Howe comes in today for follow up. Has been trying to improve diet due to HLD and prediabetes. Notes vacation a couple weeks ago where diet was not controlled. She does admit that she could do better. She notes a couple acute concerns. She has noted eczema in her scalp. She has it both at the base of her scalp as well as within her hair. She also notes two episodes of chest pain. The first was in January and the second on Valentines day. She reports that the pain was located just to left of sternum and lasted 20-30 minutes. She had been sitting during the first episode and eating during the second. The pain resolved spontaneously. She was a little nauseous but denies syncope, dizziness, SOA, palpitations. She reports this pain was similar to the pain she experienced when she had splenic infarct. At that time her spleen was found to be adherent to her diaphragm and had to be surgically removed. This was attributed to estrogen use. She has had off and on pain since then that she felt was due to scar tissue however the two recent episodes were worse. She has hx of MVP with last echo in 2022 showing normal appearing MV with trace to mild MR. She reports past stress test over 10y ago.         Review of Systems  Review of Systems   Constitutional: Negative.  Negative for diaphoresis.   Respiratory: Negative.     Cardiovascular:  Positive for chest pain and leg swelling (off and on dependent upon salt intake). Negative for palpitations.   Gastrointestinal:  Positive for nausea. Negative for vomiting.   Skin:  Positive for rash.   Neurological:  Negative for dizziness and syncope.        Current Medications  Current  Outpatient Medications on File Prior to Visit   Medication Sig Dispense Refill    albuterol (PROVENTIL) (2.5 MG/3ML) 0.083% nebulizer solution Take 2.5 mg by nebulization 4 (Four) Times a Day As Needed. 90 mL 1    albuterol (PROVENTIL) 2.5 MG/0.5ML nebulizer solution Take 2.5 mg by nebulization Every 4 (Four) Hours As Needed for Wheezing. 20 mL 0    albuterol sulfate  (90 Base) MCG/ACT inhaler Inhale 2 puffs Every 4 (Four) Hours As Needed for Wheezing. 18 g 0    azelastine (ASTEPRO) 0.15 % solution nasal spray 1 spray into the nostril(s) as directed by provider 2 (Two) Times a Day. 30 mL 10    benzonatate (TESSALON) 100 MG capsule Take 1 capsule by mouth 3 (Three) Times a Day As Needed for Cough. 21 capsule 0    betamethasone, augmented, (DIPROLENE) 0.05 % cream Apply topically to the appropriate area as directed 2 (Two) Times a Day for 14 days, then decrease to 2-3 times weekly 50 g 1    Calcium Polycarbophil (FIBER-CAPS PO) Take  by mouth Daily.      clopidogrel (PLAVIX) 75 MG tablet Take 1 tablet by mouth Daily. 90 tablet 1    Coenzyme Q10 (COQ10) 200 MG capsule Take 1 capsule by mouth Take As Directed.      denosumab (Prolia) 60 MG/ML solution prefilled syringe syringe Inject 1 mL under the skin into the appropriate area as directed Every 6 (Six) Months. 1 mL 1    Dietary Management Product (Vasculera) tablet Vasculera      doxycycline (MONODOX) 100 MG capsule Take 1 capsule by mouth 2 (Two) Times a Day. 14 capsule 0    esomeprazole (nexIUM) 40 MG capsule Take 1 capsule by mouth 2 (Two) Times a Day. 180 capsule 1    famotidine (PEPCID) 40 MG tablet Take 1 tablet by mouth 2 (Two) Times a Day. 180 tablet 1    hyoscyamine (ANASPAZ,LEVSIN) 0.125 MG tablet Take 1 tablet by mouth Every 8 (Eight) Hours As Needed for abdominal cramping 30 tablet 3    ipratropium-albuterol (Combivent Respimat)  MCG/ACT inhaler Inhale 1 puff 4 (Four) Times a Day. 12 g 1    ketotifen (Zaditor) 0.025 % ophthalmic solution Apply  "1 drop to eye(s) as directed by provider 2 (Two) Times a Day. 5 mL 5    mesalamine (DELZICOL) 400 MG capsule delayed-release delayed release capsule Take 2 capsules by mouth 2 Times a Day. 360 capsule 5    methocarbamol (ROBAXIN) 500 MG tablet Take 1 tablet by mouth Every 6-8  Hours As Needed. 30 tablet 1    methylPREDNISolone (MEDROL) 4 MG dose pack Take as directed on package instructions. 1 each 0    nebivolol (Bystolic) 5 MG tablet Take 1 tablet by mouth Daily. 90 tablet 1    NIACIN PO Take  by mouth.      Probiotic Product (PROBIOTIC ADVANCED PO) Take  by mouth Daily.      rosuvastatin (Crestor) 10 MG tablet Take 1 tablet by mouth Daily. 90 tablet 1    vitamin D3 125 MCG (5000 UT) capsule capsule Take  by mouth.      [DISCONTINUED] montelukast (Singulair) 10 MG tablet Take 1 tablet by mouth Every Morning. (Patient taking differently: Take 10 mg by mouth Daily As Needed.) 30 tablet 1     No current facility-administered medications on file prior to visit.       Allergies  Allergies   Allergen Reactions    Erythromycin GI Intolerance and GI Bleeding    Levaquin [Levofloxacin] Other (See Comments)     Patient states she gets really bad plantar fasciitis    Vancomycin Other (See Comments)     Red man syndrome    Bactrim [Sulfamethoxazole-Trimethoprim] Itching and Rash    Beeswax Rash    Other Itching and Rash     Kapidex    Zosyn [Piperacillin Sod-Tazobactam So] Rash       Objective  Visit Vitals  /80   Pulse 79   Temp 98 °F (36.7 °C) (Temporal)   Resp 16   Ht 157.5 cm (62\")   Wt 77.7 kg (171 lb 6.4 oz)   LMP  (LMP Unknown)   SpO2 98%   BMI 31.35 kg/m²        Physical Exam  Physical Exam  Vitals and nursing note reviewed.   Constitutional:       General: She is not in acute distress.     Appearance: She is well-developed. She is obese. She is not ill-appearing or toxic-appearing.   HENT:      Head: Normocephalic and atraumatic.   Eyes:      Conjunctiva/sclera: Conjunctivae normal.   Cardiovascular:      Rate " and Rhythm: Normal rate and regular rhythm.      Heart sounds: Normal heart sounds. No murmur heard.  Pulmonary:      Effort: Pulmonary effort is normal. No respiratory distress.      Breath sounds: Normal breath sounds.   Musculoskeletal:      Right lower leg: No edema.      Left lower leg: No edema.   Skin:     General: Skin is warm and dry.   Neurological:      Mental Status: She is alert and oriented to person, place, and time. Mental status is at baseline.      Gait: Gait normal.   Psychiatric:         Mood and Affect: Mood normal.         Behavior: Behavior normal.         Thought Content: Thought content normal.         Judgment: Judgment normal.         Results  Results for orders placed or performed in visit on 08/18/23   Lipid Panel    Specimen: Blood   Result Value Ref Range    Total Cholesterol 197 0 - 200 mg/dL    Triglycerides 131 0 - 150 mg/dL    HDL Cholesterol 69 (H) 40 - 60 mg/dL    LDL Cholesterol  105 (H) 0 - 100 mg/dL    VLDL Cholesterol 23 5 - 40 mg/dL    LDL/HDL Ratio 1.48    Hemoglobin A1c    Specimen: Blood   Result Value Ref Range    Hemoglobin A1C 6.40 (H) 4.80 - 5.60 %   CBC (No Diff)    Specimen: Blood   Result Value Ref Range    WBC 7.44 3.40 - 10.80 10*3/mm3    RBC 4.42 3.77 - 5.28 10*6/mm3    Hemoglobin 12.1 12.0 - 15.9 g/dL    Hematocrit 37.5 34.0 - 46.6 %    MCV 84.8 79.0 - 97.0 fL    MCH 27.4 26.6 - 33.0 pg    MCHC 32.3 31.5 - 35.7 g/dL    RDW 14.4 12.3 - 15.4 %    RDW-SD 44.2 37.0 - 54.0 fl    MPV 8.6 6.0 - 12.0 fL    Platelets 407 140 - 450 10*3/mm3   Comprehensive Metabolic Panel    Specimen: Blood   Result Value Ref Range    Glucose 92 65 - 99 mg/dL    BUN 15 8 - 23 mg/dL    Creatinine 0.72 0.57 - 1.00 mg/dL    Sodium 141 136 - 145 mmol/L    Potassium 3.7 3.5 - 5.2 mmol/L    Chloride 102 98 - 107 mmol/L    CO2 27.5 22.0 - 29.0 mmol/L    Calcium 9.6 8.6 - 10.5 mg/dL    Total Protein 6.9 6.0 - 8.5 g/dL    Albumin 4.6 3.5 - 5.2 g/dL    ALT (SGPT) 21 1 - 33 U/L    AST (SGOT) 24 1 -  32 U/L    Alkaline Phosphatase 95 39 - 117 U/L    Total Bilirubin 0.6 0.0 - 1.2 mg/dL    Globulin 2.3 gm/dL    A/G Ratio 2.0 g/dL    BUN/Creatinine Ratio 20.8 7.0 - 25.0    Anion Gap 11.5 5.0 - 15.0 mmol/L    eGFR 95.9 >60.0 mL/min/1.73   TSH Rfx On Abnormal To Free T4    Specimen: Blood   Result Value Ref Range    TSH 1.800 0.270 - 4.200 uIU/mL   Vitamin D,25-Hydroxy    Specimen: Blood   Result Value Ref Range    25 Hydroxy, Vitamin D 57.3 30.0 - 100.0 ng/ml   Magnesium    Specimen: Blood   Result Value Ref Range    Magnesium 2.2 1.6 - 2.4 mg/dL   Phosphorus    Specimen: Blood   Result Value Ref Range    Phosphorus 3.8 2.5 - 4.5 mg/dL   Hepatitis C Antibody    Specimen: Blood   Result Value Ref Range    Hepatitis C Ab Non-Reactive Non-Reactive        ECG 12 Lead    Date/Time: 2/23/2024 10:42 AM  Performed by: Jennifer Maurer MD    Authorized by: Jennifer Maurer MD  Comparison: not compared with previous ECG   Previous ECG: no previous ECG available  Rhythm: sinus rhythm  Rate: bradycardic  BPM: 58  Conduction: conduction normal  ST Segments: ST segments normal  T Waves: T waves normal  QRS axis: normal  Other: no other findings    Clinical impression: normal ECG          Assessment and Plan  Diagnoses and all orders for this visit:    Prediabetes  - A1c up to 6.4  - working on diet and has deferred metformin  - A1c today    Mixed hyperlipidemia  -  on last labs  - on crestor 10mg daily  - recheck lipids and if LDL still >100 would increase crestor to 20mg daily    Eczema of scalp  - will send in fluocinolone shampoo to be used PRN flare    Atypical chest pain  - ECG sinus bradycardia  - several risk factors including family hx so will order treadmill stress test    Mitral valve prolapse  - started on nebivolol 5mg daily by prior PCP  - Echo 10/2022 with trace to mild MR, normal appearing mitral valve     Health Maintenance  - Pap smear: s/p hysterectomy  - Mammogram: 6/2023 BIRADS 1  - Colonoscopy:  will get records from Dr. Guillen  - HCV: negative  - Immunizations: PCV20 8/2023. Flu UTD. COVID, RSV discussed. Shingrix complete. Tdap 2/2020.   - Depression screening: negative 2/2024    Return in about 3 months (around 5/23/2024) for Follow up chest pain, Labs today.

## 2024-02-24 LAB
CHOLEST SERPL-MCNC: 184 MG/DL (ref 0–200)
HBA1C MFR BLD: 5.9 % (ref 4.8–5.6)
HDLC SERPL-MCNC: 68 MG/DL (ref 40–60)
LDLC SERPL CALC-MCNC: 97 MG/DL (ref 0–100)
LDLC/HDLC SERPL: 1.39 {RATIO}
TRIGL SERPL-MCNC: 107 MG/DL (ref 0–150)
VLDLC SERPL-MCNC: 19 MG/DL (ref 5–40)

## 2024-03-22 ENCOUNTER — HOSPITAL ENCOUNTER (OUTPATIENT)
Dept: CARDIOLOGY | Facility: HOSPITAL | Age: 64
Discharge: HOME | End: 2024-03-22
Payer: COMMERCIAL

## 2024-03-22 VITALS
DIASTOLIC BLOOD PRESSURE: 80 MMHG | WEIGHT: 171.3 LBS | BODY MASS INDEX: 31.52 KG/M2 | OXYGEN SATURATION: 97 % | HEART RATE: 71 BPM | SYSTOLIC BLOOD PRESSURE: 140 MMHG | HEIGHT: 62 IN

## 2024-03-22 DIAGNOSIS — R07.89 ATYPICAL CHEST PAIN: ICD-10-CM

## 2024-03-22 LAB
BH CV STRESS BP STAGE 1: NORMAL
BH CV STRESS BP STAGE 2: NORMAL
BH CV STRESS BP STAGE 3: NORMAL
BH CV STRESS DURATION MIN STAGE 1: 3
BH CV STRESS DURATION MIN STAGE 2: 3
BH CV STRESS DURATION MIN STAGE 3: 0
BH CV STRESS DURATION SEC STAGE 1: 0
BH CV STRESS DURATION SEC STAGE 2: 0
BH CV STRESS DURATION SEC STAGE 3: 45
BH CV STRESS GRADE STAGE 1: 10
BH CV STRESS GRADE STAGE 2: 12
BH CV STRESS GRADE STAGE 3: 14
BH CV STRESS HR STAGE 1: 122
BH CV STRESS HR STAGE 2: 141
BH CV STRESS HR STAGE 3: 146
BH CV STRESS METS STAGE 1: 5
BH CV STRESS METS STAGE 2: 7.5
BH CV STRESS METS STAGE 3: 10
BH CV STRESS O2 STAGE 1: 98
BH CV STRESS O2 STAGE 2: 98
BH CV STRESS O2 STAGE 3: 96
BH CV STRESS PROTOCOL 1: NORMAL
BH CV STRESS RECOVERY BP: NORMAL MMHG
BH CV STRESS RECOVERY HR: 86 BPM
BH CV STRESS RECOVERY O2: 98 %
BH CV STRESS SPEED STAGE 1: 1.7
BH CV STRESS SPEED STAGE 2: 2.5
BH CV STRESS SPEED STAGE 3: 3.4
BH CV STRESS STAGE 1: 1
BH CV STRESS STAGE 2: 2
BH CV STRESS STAGE 3: 3
MAXIMAL PREDICTED HEART RATE: 159 BPM
PERCENT MAX PREDICTED HR: 93.08 %
STRESS BASELINE BP: NORMAL MMHG
STRESS BASELINE HR: 71 BPM
STRESS O2 SAT REST: 97 %
STRESS PERCENT HR: 110 %
STRESS POST ESTIMATED WORKLOAD: 8.1 METS
STRESS POST EXERCISE DUR MIN: 6 MIN
STRESS POST EXERCISE DUR SEC: 45 SEC
STRESS POST O2 SAT PEAK: 96 %
STRESS POST PEAK BP: NORMAL MMHG
STRESS POST PEAK HR: 148 BPM
STRESS TARGET HR: 135 BPM

## 2024-03-22 PROCEDURE — 93017 CV STRESS TEST TRACING ONLY: CPT

## 2024-05-31 ENCOUNTER — OFFICE VISIT (OUTPATIENT)
Dept: INTERNAL MEDICINE | Facility: CLINIC | Age: 64
End: 2024-05-31
Payer: COMMERCIAL

## 2024-05-31 VITALS
HEIGHT: 62 IN | BODY MASS INDEX: 31.28 KG/M2 | SYSTOLIC BLOOD PRESSURE: 112 MMHG | WEIGHT: 170 LBS | RESPIRATION RATE: 18 BRPM | HEART RATE: 70 BPM | OXYGEN SATURATION: 99 % | DIASTOLIC BLOOD PRESSURE: 78 MMHG

## 2024-05-31 DIAGNOSIS — Z12.31 ENCOUNTER FOR SCREENING MAMMOGRAM FOR MALIGNANT NEOPLASM OF BREAST: ICD-10-CM

## 2024-05-31 DIAGNOSIS — R07.89 ATYPICAL CHEST PAIN: Primary | ICD-10-CM

## 2024-05-31 DIAGNOSIS — R22.2 PALPABLE MASS OF LOWER BACK: ICD-10-CM

## 2024-05-31 DIAGNOSIS — M81.0 OSTEOPOROSIS, UNSPECIFIED OSTEOPOROSIS TYPE, UNSPECIFIED PATHOLOGICAL FRACTURE PRESENCE: ICD-10-CM

## 2024-05-31 DIAGNOSIS — R73.03 PREDIABETES: ICD-10-CM

## 2024-05-31 DIAGNOSIS — R94.39 ABNORMAL STRESS TEST: ICD-10-CM

## 2024-05-31 PROCEDURE — 99214 OFFICE O/P EST MOD 30 MIN: CPT | Performed by: INTERNAL MEDICINE

## 2024-05-31 RX ORDER — METOPROLOL TARTRATE 1 MG/ML
5 INJECTION, SOLUTION INTRAVENOUS
OUTPATIENT
Start: 2024-05-31

## 2024-05-31 RX ORDER — SODIUM CHLORIDE 9 MG/ML
40 INJECTION, SOLUTION INTRAVENOUS AS NEEDED
OUTPATIENT
Start: 2024-05-31

## 2024-05-31 RX ORDER — LIDOCAINE HYDROCHLORIDE 10 MG/ML
0.5 INJECTION, SOLUTION EPIDURAL; INFILTRATION; INTRACAUDAL; PERINEURAL ONCE AS NEEDED
OUTPATIENT
Start: 2024-05-31

## 2024-05-31 RX ORDER — NITROGLYCERIN 0.4 MG/1
0.4 TABLET SUBLINGUAL
OUTPATIENT
Start: 2024-05-31 | End: 2024-05-31

## 2024-05-31 RX ORDER — SODIUM CHLORIDE 0.9 % (FLUSH) 0.9 %
10 SYRINGE (ML) INJECTION AS NEEDED
OUTPATIENT
Start: 2024-05-31

## 2024-05-31 RX ORDER — METOPROLOL TARTRATE 50 MG/1
TABLET, FILM COATED ORAL
Qty: 4 TABLET | Refills: 0 | Status: SHIPPED | OUTPATIENT
Start: 2024-05-31

## 2024-05-31 RX ORDER — METOPROLOL TARTRATE 50 MG/1
50 TABLET, FILM COATED ORAL
OUTPATIENT
Start: 2024-05-31

## 2024-05-31 RX ORDER — NITROGLYCERIN 0.4 MG/1
0.8 TABLET SUBLINGUAL
OUTPATIENT
Start: 2024-05-31

## 2024-05-31 RX ORDER — SODIUM CHLORIDE 0.9 % (FLUSH) 0.9 %
10 SYRINGE (ML) INJECTION EVERY 12 HOURS SCHEDULED
OUTPATIENT
Start: 2024-05-31

## 2024-05-31 NOTE — PROGRESS NOTES
Internal Medicine Follow Up    Chief Complaint  Antonieta Howe is a 63 y.o. female who presents today for follow up of chronic medical conditions outlined below.    Chief Complaint   Patient presents with    Chest Pain     Follow up and labs        HPI  Ms. Howe comes in today for follow up. Continues to have intermittent mild chest pain. Okay with proceeding with coronary CTA. She notes a palpable lesion in upper low back that has been present for years. Tender to palpation. She wonders what it may be. Previously told it was possibly a lipoma. No imaging has been done. Due for mammogram and DEXA. Dr. Alva continues her prolia.    Chest Pain          Review of Systems  Review of Systems   Constitutional: Negative.    Respiratory: Negative.     Cardiovascular:  Positive for chest pain.   Musculoskeletal:  Positive for arthralgias.   Skin:  Positive for skin lesions (low back).        Current Medications  Current Outpatient Medications on File Prior to Visit   Medication Sig Dispense Refill    albuterol (PROVENTIL) 2.5 MG/0.5ML nebulizer solution Take 2.5 mg by nebulization Every 4 (Four) Hours As Needed for Wheezing. 20 mL 0    albuterol sulfate  (90 Base) MCG/ACT inhaler Inhale 2 puffs Every 4 (Four) Hours As Needed for Wheezing. 18 g 0    azelastine (ASTEPRO) 0.15 % solution nasal spray 1 spray into the nostril(s) as directed by provider 2 (Two) Times a Day. 30 mL 10    betamethasone, augmented, (DIPROLENE) 0.05 % cream Apply topically to the appropriate area as directed 2 (Two) Times a Day for 14 days, then decrease to 2-3 times weekly 50 g 1    Calcium Polycarbophil (FIBER-CAPS PO) Take  by mouth Daily.      clopidogrel (PLAVIX) 75 MG tablet Take 1 tablet by mouth Daily. 90 tablet 1    Coenzyme Q10 (COQ10) 200 MG capsule Take 1 capsule by mouth Take As Directed.      denosumab (Prolia) 60 MG/ML solution prefilled syringe syringe Inject 1 mL under the skin into the appropriate area as directed Every 6  (Six) Months. 1 mL 1    Dietary Management Product (Vasculera) tablet Vasculera      esomeprazole (nexIUM) 40 MG capsule Take 1 capsule by mouth 2 (Two) Times a Day. 180 capsule 1    famotidine (PEPCID) 40 MG tablet Take 1 tablet by mouth 2 (Two) Times a Day. 180 tablet 1    ipratropium-albuterol (Combivent Respimat)  MCG/ACT inhaler Inhale 1 puff 4 (Four) Times a Day. 12 g 1    ketotifen (Zaditor) 0.025 % ophthalmic solution Apply 1 drop to eye(s) as directed by provider 2 (Two) Times a Day. 5 mL 5    mesalamine (DELZICOL) 400 MG capsule delayed-release delayed release capsule Take 2 capsules by mouth 2 Times a Day. 360 capsule 5    methocarbamol (ROBAXIN) 500 MG tablet Take 1 tablet by mouth Every 6-8  Hours As Needed. 30 tablet 1    nebivolol (Bystolic) 5 MG tablet Take 1 tablet by mouth Daily. 90 tablet 1    NIACIN PO Take  by mouth.      Probiotic Product (PROBIOTIC ADVANCED PO) Take  by mouth Daily.      rosuvastatin (Crestor) 10 MG tablet Take 1 tablet by mouth Daily. 90 tablet 1    tiZANidine (ZANAFLEX) 2 MG tablet take 0.5-1 tablet in the am and afternoon and 1-2 at bedtime 90 tablet 3    vitamin D3 125 MCG (5000 UT) capsule capsule Take  by mouth.      [DISCONTINUED] albuterol (PROVENTIL) (2.5 MG/3ML) 0.083% nebulizer solution Take 2.5 mg by nebulization 4 (Four) Times a Day As Needed. 90 mL 1    [DISCONTINUED] benzonatate (TESSALON) 100 MG capsule Take 1 capsule by mouth 3 (Three) Times a Day As Needed for Cough. 21 capsule 0    [DISCONTINUED] doxycycline (MONODOX) 100 MG capsule Take 1 capsule by mouth 2 (Two) Times a Day. 14 capsule 0    [DISCONTINUED] Fluocinolone Acetonide (Capex) 0.01 % shampoo Apply 1 fluid ounce topically Daily As Needed (scalp irritations, rash). (Patient not taking: Reported on 5/31/2024) 120 mL 0    [DISCONTINUED] hyoscyamine (ANASPAZ,LEVSIN) 0.125 MG tablet Take 1 tablet by mouth Every 8 (Eight) Hours As Needed for abdominal cramping (Patient not taking: Reported on  "5/31/2024) 30 tablet 3    [DISCONTINUED] methylPREDNISolone (MEDROL) 4 MG dose pack Take as directed on package instructions. 1 each 0    [DISCONTINUED] montelukast (Singulair) 10 MG tablet Take 1 tablet by mouth Every Morning. (Patient taking differently: Take 10 mg by mouth Daily As Needed.) 30 tablet 1     No current facility-administered medications on file prior to visit.       Allergies  Allergies   Allergen Reactions    Erythromycin GI Bleeding and GI Intolerance    Levaquin [Levofloxacin] Other (See Comments)     Patient states she gets really bad plantar fasciitis    Vancomycin Other (See Comments)     Red man syndrome    Bactrim [Sulfamethoxazole-Trimethoprim] Itching and Rash    Beeswax Rash    Other Itching and Rash     Kapidex    Zosyn [Piperacillin Sod-Tazobactam So] Rash       Objective  Visit Vitals  /78   Pulse 70   Resp 18   Ht 157.5 cm (62.01\")   Wt 77.1 kg (170 lb)   LMP  (LMP Unknown)   SpO2 99%   BMI 31.08 kg/m²        Physical Exam  Physical Exam  Vitals and nursing note reviewed.   Constitutional:       General: She is not in acute distress.     Appearance: She is well-developed.   HENT:      Head: Normocephalic and atraumatic.   Eyes:      Conjunctiva/sclera: Conjunctivae normal.   Cardiovascular:      Rate and Rhythm: Normal rate and regular rhythm.      Heart sounds: Normal heart sounds. No murmur heard.  Pulmonary:      Effort: Pulmonary effort is normal. No respiratory distress.      Breath sounds: Normal breath sounds.   Skin:     General: Skin is warm and dry.      Comments: Upper low back with palpable subcutaneous mass which has indiscrete borders and is difficult overall to appreciate. Does not seem mobile. Located to R of spine.   Neurological:      Mental Status: She is alert and oriented to person, place, and time. Mental status is at baseline.      Gait: Gait normal.         Results  Results for orders placed or performed during the hospital encounter of 03/22/24 "   Treadmill Stress Test   Result Value Ref Range    BH CV STRESS PROTOCOL 1 Juwan     Stage 1 1.0     Duration Min Stage 1 3     Duration Sec Stage 1 0     Grade Stage 1 10     Speed Stage 1 1.7     BH CV STRESS METS STAGE 1 5.0     Baseline HR 71 bpm    Baseline /80 mmHg    Target HR (85%) 135 bpm    Max. Pred. HR (100%) 159 bpm    HR Stage 1 122     BP Stage 1 144/92     O2 Stage 1 98     Stage 2 2.0     HR Stage 2 141     BP Stage 2 150/98     O2 Stage 2 98     Duration Min Stage 2 3     Duration Sec Stage 2 0     Grade Stage 2 12     Speed Stage 2 2.5     BH CV STRESS METS STAGE 2 7.5     Stage 3 3.0     HR Stage 3 146     BP Stage 3 160/100     O2 Stage 3 96     Duration Min Stage 3 0     Duration Sec Stage 3 45     Grade Stage 3 14     Speed Stage 3 3.4     BH CV STRESS METS STAGE 3 10.0     O2 sat rest 97 %    Peak  bpm    Peak /100 mmHg    O2 sat peak 96 %    Recovery HR 86 bpm    Recovery /84 mmHg    Recovery O2 98 %    Percent Max Pred HR 93.08 %    Exercise duration (min) 6 min    Exercise duration (sec) 45 sec    Estimated workload 8.1 METS    Percent Target  %        Assessment and Plan  Diagnoses and all orders for this visit:    Atypical chest pain  Abnormal stress test  - treadmill stress test moderate risk with nonspecific changes noted. Cardiology recommended coronary CTA which was discussed and ordered today.    Osteoporosis, unspecified osteoporosis type, unspecified pathological fracture presence  - follows with Dr. Alva, improving on prolia   - DEXA ordered    Prediabetes  - A1c improved to 5.9    Palpable mass of lower back  - difficult to appreciate, does not seem consistent with lipoma. Longstanding and likely benign process. Defers imaging.    Encounter for screening mammogram for malignant neoplasm of breast  -     Mammo Screening Digital Tomosynthesis Bilateral With CAD; Future     Health Maintenance  - Pap smear: s/p hysterectomy  - Mammogram: 6/2023 BIRADS  1, ordered  - Colonoscopy: next 12/2024  - HCV: negative  - Immunizations: PCV20 8/2023. COVID, RSV discussed. Shingrix complete. Tdap 2/2020.   - Depression screening: negative 2/2024    Return for Next scheduled follow up.

## 2024-06-24 DIAGNOSIS — K57.92 DIVERTICULITIS: Primary | ICD-10-CM

## 2024-06-24 RX ORDER — AMOXICILLIN AND CLAVULANATE POTASSIUM 875; 125 MG/1; MG/1
1 TABLET, FILM COATED ORAL 2 TIMES DAILY
Qty: 20 TABLET | Refills: 0 | Status: SHIPPED | OUTPATIENT
Start: 2024-06-24 | End: 2024-07-04

## 2024-06-24 NOTE — PROGRESS NOTES
Patient with an acute episode of diverticulitis and unable to get in to see her PCP for antibiotics. She had some home antibiotics but not a full course (Xifaxin), reports these helped. Pt states she is not allergic to penicillin. She has a listed allergy to Zosyn, but per pt she had this directly after having vancomycin, and it was actually just red man's from that. She has taken other penicillins with no issues. Will send Augmentin to her pharmacy.

## 2024-07-11 LAB
NCCN CRITERIA FLAG: NORMAL
TYRER CUZICK SCORE: 4.5

## 2024-07-12 ENCOUNTER — HOSPITAL ENCOUNTER (OUTPATIENT)
Dept: MAMMOGRAPHY | Facility: HOSPITAL | Age: 64
Discharge: HOME | End: 2024-07-12
Payer: COMMERCIAL

## 2024-07-12 ENCOUNTER — APPOINTMENT (OUTPATIENT)
Dept: BONE DENSITY | Facility: HOSPITAL | Age: 64
End: 2024-07-12
Payer: COMMERCIAL

## 2024-07-12 ENCOUNTER — HOSPITAL ENCOUNTER (OUTPATIENT)
Dept: BONE DENSITY | Facility: HOSPITAL | Age: 64
Discharge: HOME | End: 2024-07-12
Payer: COMMERCIAL

## 2024-07-12 DIAGNOSIS — M81.0 OSTEOPOROSIS, UNSPECIFIED OSTEOPOROSIS TYPE, UNSPECIFIED PATHOLOGICAL FRACTURE PRESENCE: ICD-10-CM

## 2024-07-12 DIAGNOSIS — Z12.31 ENCOUNTER FOR SCREENING MAMMOGRAM FOR MALIGNANT NEOPLASM OF BREAST: ICD-10-CM

## 2024-07-12 PROCEDURE — 77080 DXA BONE DENSITY AXIAL: CPT

## 2024-07-12 PROCEDURE — 77063 BREAST TOMOSYNTHESIS BI: CPT

## 2024-07-12 PROCEDURE — 77067 SCR MAMMO BI INCL CAD: CPT

## 2024-07-15 ENCOUNTER — TELEPHONE (OUTPATIENT)
Dept: INTERNAL MEDICINE | Facility: CLINIC | Age: 64
End: 2024-07-15
Payer: COMMERCIAL

## 2024-07-15 NOTE — TELEPHONE ENCOUNTER
Called and spoke to pt. Gave message from provider. Pt voiced understanding and appreciation.       ----- Message from Dory Victoria sent at 7/15/2024 10:44 AM EDT -----  Patient know Dr. Maurer is currently out of the office  DEXA scan resulted showing osteoporosis.  I did see in your last note that you are following with Dr. Alva and using Prolia.  Continue to follow with eye specialist.

## 2024-08-12 DIAGNOSIS — Z86.718 HISTORY OF BLOOD CLOTS: ICD-10-CM

## 2024-08-12 RX ORDER — ROSUVASTATIN CALCIUM 10 MG/1
10 TABLET, COATED ORAL DAILY
Qty: 90 TABLET | Refills: 0 | Status: SHIPPED | OUTPATIENT
Start: 2024-08-12

## 2024-08-12 RX ORDER — NEBIVOLOL 5 MG/1
5 TABLET ORAL DAILY
Qty: 90 TABLET | Refills: 0 | Status: SHIPPED | OUTPATIENT
Start: 2024-08-12

## 2024-08-12 RX ORDER — FAMOTIDINE 40 MG/1
40 TABLET, FILM COATED ORAL 2 TIMES DAILY
Qty: 90 TABLET | Refills: 0 | Status: SHIPPED | OUTPATIENT
Start: 2024-08-12

## 2024-08-12 RX ORDER — ESOMEPRAZOLE MAGNESIUM 40 MG/1
40 CAPSULE, DELAYED RELEASE ORAL 2 TIMES DAILY
Qty: 90 CAPSULE | Refills: 0 | Status: SHIPPED | OUTPATIENT
Start: 2024-08-12

## 2024-08-12 RX ORDER — CLOPIDOGREL BISULFATE 75 MG/1
75 TABLET ORAL DAILY
Qty: 90 TABLET | Refills: 0 | Status: SHIPPED | OUTPATIENT
Start: 2024-08-12

## 2024-08-12 NOTE — TELEPHONE ENCOUNTER
Last filled 02/06/24  clopidogrel (PLAVIX) 75 MG    90 w/ 1 refills   rosuvastatin (Crestor) 10 MG   90 w/ 1 refills   nebivolol (Bystolic) 5 MG    90 w/ 1 refills     Last filled 07/27/23  esomeprazole (nexIUM) 40 MG     180 w/ 1 refills  famotidine (PEPCID) 40 MG  180 w/ 1 refills    LOV:  05/31/24  NOV: 11/15/24    Sent in   clopidogrel (PLAVIX) 75 MG    90 w/ 0 refills   rosuvastatin (Crestor) 10 MG   90 w/ 0 refills   nebivolol (Bystolic) 5 MG    90 w/ 0 refills   esomeprazole (nexIUM) 40 MG     90 w/ 0 refills   famotidine (PEPCID) 40 MG  90 w/ 0 refills

## 2024-09-09 ENCOUNTER — SPECIALTY PHARMACY (OUTPATIENT)
Age: 64
End: 2024-09-09
Payer: COMMERCIAL

## 2024-09-09 ENCOUNTER — TELEPHONE (OUTPATIENT)
Age: 64
End: 2024-09-09
Payer: COMMERCIAL

## 2024-09-09 DIAGNOSIS — M81.0 OSTEOPOROSIS, UNSPECIFIED OSTEOPOROSIS TYPE, UNSPECIFIED PATHOLOGICAL FRACTURE PRESENCE: Primary | ICD-10-CM

## 2024-09-09 DIAGNOSIS — Z79.899 HIGH RISK MEDICATION USE: ICD-10-CM

## 2024-09-09 NOTE — TELEPHONE ENCOUNTER
----- Message from Milena GOLDSTEIN sent at 9/9/2024 10:31 AM EDT -----  PT will have her Prolia Assessment on 09/10/2024. PT needs labs and to be scheduled for Prolia. Pt prefers Friday appointments Please try and schedule for  9/27. Pt is out of town next Friday 9/20  ----- Message -----  From: Milena Collier, Pharmacy Technician  Sent: 9/9/2024   8:07 AM EDT  To:  [facility]    [facility]rol[facility] PA [facility] Key: [id number]  ----- Message -----  From: Albina Leon MA  Sent: 9/6/2024  11:49 AM EDT  To: Milena Collier Pharmacy Technician      ----- Message -----  From: Kacey Peña RegSched Rep  Sent: 8/12/2024  10:58 AM EDT  To: [facility] [place]

## 2024-09-09 NOTE — TELEPHONE ENCOUNTER
Pt is scheduled for 9/27 at 10am and is aware she must get labs prior. Last prolia 2/2024 per pt. Thank you all!

## 2024-09-10 ENCOUNTER — SPECIALTY PHARMACY (OUTPATIENT)
Dept: GENERAL RADIOLOGY | Facility: HOSPITAL | Age: 64
End: 2024-09-10
Payer: COMMERCIAL

## 2024-09-10 RX ORDER — DENOSUMAB 60 MG/ML
60 INJECTION SUBCUTANEOUS
Qty: 1 ML | Refills: 0 | Status: SHIPPED | OUTPATIENT
Start: 2024-09-10

## 2024-09-10 NOTE — PROGRESS NOTES
Specialty Pharmacy Patient Management Program  Per Protocol Prescription Order/Refill     Patient currently fills medications at [facility] and is enrolled in an Rheumatology Patient Management Program.     Requested Prescriptions     Signed Prescriptions Disp Refills    denosumab (Prolia) 60 MG/ML solution prefilled syringe syringe 1 mL 0     Sig: Inject 1 mL under the skin into the appropriate area as directed Every 6 (Six) Months.     Prescription orders above were sent to the pharmacy per Collaborative Care Agreement Protocol.

## 2024-09-10 NOTE — PROGRESS NOTES
Specialty Pharmacy Patient Management Program  Rheumatology Initial Assessment     Antonieta Howe was referred by an Rheumatology provider to the Rheumatology Patient Management program offered by [facility] for Osteoporosis on 09/10/24.  An initial outreach was conducted, including assessment of therapy appropriateness and specialty medication education for Prolia. The patient was introduced to services offered by [facility], including: regular assessments, refill coordination, curbside pick-up or mail order delivery options, prior authorization maintenance, and financial assistance programs as applicable. The patient was also provided with contact information for the pharmacy team.     Insurance Coverage & Financial Support  [employer]     Relevant Past Medical History and Comorbidities  Relevant medical history and concomitant health conditions were discussed with the patient. The patient's chart has been reviewed for relevant past medical history and comorbid conditions and updated as necessary.  Past Medical History:   Diagnosis Date    Achilles tendonitis     Diverticulitis 2019    Esophagitis     Gastrointestinal disorder     Hepatitis     Hyperlipidemia     Osteoarthritis     Osteoporosis     Plantar fasciitis     Skin disorder     Splenic infarct      Social History     Socioeconomic History    Marital status:    Tobacco Use    Smoking status: Former     Current packs/day: 0.00     Average packs/day: 0.5 packs/day for 10.0 years (5.0 ttl pk-yrs)     Types: Cigarettes     Start date:      Quit date:      Years since quittin.7    Smokeless tobacco: Never   Vaping Use    Vaping status: Never Used   Substance and Sexual Activity    Alcohol use: No     Comment: never a heavy drinker    Drug use: No    Sexual activity: Defer       Problem list reviewed by Oleg Rosales, PharmD on 9/10/2024 at 11:19 AM    Allergies  Known allergies  and reactions were discussed with the patient. The patient's chart has been reviewed for  allergy information and updated as necessary.   Allergies   Allergen Reactions    Erythromycin GI Bleeding and GI Intolerance    Levaquin [Levofloxacin] Other (See Comments)     Patient states she gets really bad plantar fasciitis    Vancomycin Other (See Comments)     Red man syndrome    Bactrim [Sulfamethoxazole-Trimethoprim] Itching and Rash    Beeswax Rash    Other Itching and Rash     Kapidex    Zosyn [Piperacillin Sod-Tazobactam So] Rash       Allergies reviewed by Oleg Rosales PharmD on 9/10/2024 at 11:18 AM    Relevant Laboratory Values  Relevant laboratory values were discussed with the patient. The following specialty medication dose adjustment(s) are recommended: n/a  A1C Last 3 Results          2/23/2024    10:55   HGBA1C Last 3 Results   Hemoglobin A1C 5.90      Lab Results   Component Value Date    HGBA1C 5.90 (H) 02/23/2024     Lab Results   Component Value Date    GLUCOSE 92 08/18/2023    CALCIUM 9.6 08/18/2023     08/18/2023    K 3.7 08/18/2023    CO2 27.5 08/18/2023     08/18/2023    BUN 15 08/18/2023    CREATININE 0.72 08/18/2023    EGFRIFNONA 95 11/22/2021    BCR 20.8 08/18/2023    ANIONGAP 11.5 08/18/2023     Lab Results   Component Value Date    CHOL 184 02/23/2024    CHLPL 174 10/08/2022    TRIG 107 02/23/2024    HDL 68 (H) 02/23/2024    LDL 97 02/23/2024         Current Medication List  This medication list has been reviewed with the patient and evaluated for any interactions or necessary modifications/recommendations, and updated to include all prescription medications, OTC medications, and supplements the patient is currently taking.  This list reflects what is contained in the patient's profile, which has also been marked as reviewed to communicate to other providers it is the most up to date version of the patient's current medication therapy.     Current Outpatient  Medications:     albuterol (PROVENTIL) 2.5 MG/0.5ML nebulizer solution, Take 2.5 mg by nebulization Every 4 (Four) Hours As Needed for Wheezing., Disp: 20 mL, Rfl: 0    albuterol sulfate  (90 Base) MCG/ACT inhaler, Inhale 2 puffs Every 4 (Four) Hours As Needed for Wheezing., Disp: 18 g, Rfl: 0    azelastine (ASTEPRO) 0.15 % solution nasal spray, 1 spray into the nostril(s) as directed by provider 2 (Two) Times a Day., Disp: 30 mL, Rfl: 10    betamethasone, augmented, (DIPROLENE) 0.05 % cream, Apply topically to the appropriate area as directed 2 (Two) Times a Day for 14 days, then decrease to 2-3 times weekly, Disp: 50 g, Rfl: 1    Calcium Polycarbophil (FIBER-CAPS PO), Take  by mouth Daily., Disp: , Rfl:     clopidogrel (PLAVIX) 75 MG tablet, Take 1 tablet by mouth Daily., Disp: 90 tablet, Rfl: 0    Coenzyme Q10 (COQ10) 200 MG capsule, Take 1 capsule by mouth Take As Directed., Disp: , Rfl:     denosumab (Prolia) 60 MG/ML solution prefilled syringe syringe, Inject 1 mL under the skin into the appropriate area as directed Every 6 (Six) Months., Disp: 1 mL, Rfl: 1    Dietary Management Product (Vasculera) tablet, Vasculera, Disp: , Rfl:     esomeprazole (nexIUM) 40 MG capsule, Take 1 capsule by mouth 2 (Two) Times a Day., Disp: 90 capsule, Rfl: 0    famotidine (PEPCID) 40 MG tablet, Take 1 tablet by mouth 2 (Two) Times a Day., Disp: 90 tablet, Rfl: 0    ipratropium-albuterol (Combivent Respimat)  MCG/ACT inhaler, Inhale 1 puff 4 (Four) Times a Day., Disp: 12 g, Rfl: 1    ketotifen (Zaditor) 0.025 % ophthalmic solution, Apply 1 drop to eye(s) as directed by provider 2 (Two) Times a Day., Disp: 5 mL, Rfl: 5    mesalamine (DELZICOL) 400 MG capsule delayed-release delayed release capsule, Take 2 capsules by mouth 2 Times a Day., Disp: 360 capsule, Rfl: 5    methocarbamol (ROBAXIN) 500 MG tablet, Take 1 tablet by mouth Every 6-8  Hours As Needed., Disp: 30 tablet, Rfl: 1    metoprolol tartrate (LOPRESSOR) 50  MG tablet, Measure heart rate and take 1 tablet if heart rate lower than 70, take 2 tablets if heart rate 70 or higher. Do this at bedtime the night before the CT Scan and 1 hour prior to CT Scan appointment, Disp: 4 tablet, Rfl: 0    nebivolol (Bystolic) 5 MG tablet, Take 1 tablet by mouth Daily., Disp: 90 tablet, Rfl: 0    NIACIN PO, Take  by mouth., Disp: , Rfl:     Probiotic Product (PROBIOTIC ADVANCED PO), Take  by mouth Daily., Disp: , Rfl:     rosuvastatin (Crestor) 10 MG tablet, Take 1 tablet by mouth Daily., Disp: 90 tablet, Rfl: 0    tiZANidine (ZANAFLEX) 2 MG tablet, take 0.5-1 tablet in the am and afternoon and 1-2 at bedtime, Disp: 90 tablet, Rfl: 3    vitamin D3 125 MCG (5000 UT) capsule capsule, Take  by mouth., Disp: , Rfl:     Medicines reviewed by Oleg Rosales, PharmD on 9/10/2024 at 11:19 AM    Drug Interactions  N/a    Initial Education Provided for Specialty Medication  The patient has been provided with the following education and any applicable administration techniques (i.e. self-injection) have been demonstrated for the therapies indicated. All questions and concerns have been addressed prior to the patient receiving the medication, and the patient has verbalized comprehension of the education and any materials provided. Additional patient education shall be provided and documented upon request by the patient, provider, or payer.    PROLIA® (denosumab)  Medication Expectations   Why am I taking this medication? You are taking this medication to help prevent bone fractures because you have osteoporosis.    What should I expect while on this medication? You should expect to see your bone mineral density increase over a period of 18 to 24 months. During this time, you should expect to get regular lab work as directed by your doctor to monitor your progress.    How does the medication work? Osteoporosis can reduce your bone density, increasing your risk for fractures. Prolia is a  monoclonal antibody with affinity for nuclear factor-kappa ligand (RANKL).  Osteoblasts secrete RANKL and RANKL activates osteoclast precursors and subsequent osteolysis which promotes release of bone-derived growth factors and increase calcium levels.  Prolia bind to RANKL and blocks interaction of RANKL and RANK receptors located on osteoclasts that lead to osteoclast formation.     How long will I be on this medication for? The amount of time you will be on this medication will be determined by your doctor based on your lab results.     How do I take this medication? Take as directed on your prescription label. Prolia is generally administered by a health care professional.    What are some possible side effects? The most common side effects include increased calcium in the body (hypercalcemia).  You may also experience back, muscle, or joint pain, headache, signs of a common cold, or pain in arms or legs.  Your doctor will monitor your calcium levels through regular lab work. Talk with your doctor if you notice these or other side effects that do not go away or get better with time.      What happens if I miss a dose? Will be administered by a health care professional.      Medication Safety   What are things I should warn my doctor immediately about? Tell your doctor if you experience confusion, mouth sores, swelling in the arms or legs, feeling very tried or weak, any new strange groin, hip, or thigh pain, very bad bone, joint, or muscle pain, shortness of breath,  jaw swelling or pain, infection like very bad sore throat, pancreatitis, skin infection, high or low blood pressure, chest pain, abnormal heartbeat, oren bumps or patches on skin, bladder pain, or passing more urine than usual. Talk to your doctor if you are pregnant, planning to become pregnant, or breastfeeding. Also tell your doctor if you notice any signs/symptoms of an allergic reaction (rash, hives, difficulty breathing, etc.) or blisters on  your skin.     What are things that I should be aware of? Be cautious of any side effects from this medication. Talk to your doctor if any new ones develop or aren't getting better.     What are some medications that can interact with this one? There are no currently known medication interactions that would be serious.  However, this does not mean that medication interactions cannot happen.  Always tell your doctor or pharmacist immediately if you start taking any new medications, including over-the-counter medications, vitamins, and herbal supplements.       Medication Storage/Handling   How should I handle this medication? Keep this medication out of reach of pets/children in the original container. Do not remove medication from the injector pen.  Use caution when administering medication as needles are required with use.     How does this medication need to be stored? Store in the refrigerator. It is important to avoid freezing the medication.  Remove from the refrigerator only when you are ready to inject your dose.    How should I dispose of this medication? Discard syringe into sharps container once used.       Resources/Support   How can I remind myself to take this medication? Since this medication must be administered in a healthcare setting, refills will be prompted by provider and clinic.    Is financial support available?  [employer] can provide co-pay cards if you have commercial insurance or patient assistance if you have Medicare or no insurance.    Which vaccines are recommended for me? Talk to your doctor about these vaccines: Flu, Coronavirus (COVID-19), Pneumococcal (pneumonia), Tdap, Hepatitis B, Zoster (shingles)        Adherence and Self-Administration  Adherence related to the patient's specialty therapy was discussed with the patient. The Adherence segment of this outreach has been reviewed and updated.     Is there a concern with patient's ability to self administer the medication correctly and  without issue?: No  Were any potential barriers to adherence identified during the initial assessment or patient education?: No  Are there any concerns regarding the patient's understanding of the importance of medication adherence?: No  Methods for Supporting Patient Adherence and/or Self-Administration: Clinic administered.     Open Medication Therapy Problems  No medication therapy recommendations to display    Goals of Therapy  Goals related to the patient's specialty therapy were discussed with the patient. The Patient Goals segment of this outreach has been reviewed and updated.   Goals Addressed Today    None       Reassessment Plan & Follow-Up  1. Medication Therapy Changes: Prolia 60mg subq once every 6 months  2. Related Plans, Therapy Recommendations, or Therapy Problems to Be Addressed: Patient currently on therapy and does not have any questions about medication or administration.  Patient scheduled for injection on 9.27.  Advised patient to call direct line with any further questions.  3. Pharmacist to perform regular assessments no more than (6) months from the previous assessment.  4. Care Coordinator to set up future refill outreaches, coordinate prescription delivery, and escalate clinical questions to pharmacist.  5. Welcome information and patient satisfaction survey to be sent by specialty pharmacy team with patient's initial fill.    Attestation  Therapeutic appropriateness: Appropriate   I attest the patient was actively involved in and has agreed to the above plan of care. If the prescribed therapy is at any point deemed not appropriate based on the current or future assessments, a consultation will be initiated with the patient's specialty care provider to determine the best course of action. The revised plan of therapy will be documented along with any required assessments and/or additional patient education provided.     Oleg Rosales, PharmD  Clinical Specialty Pharmacist,  Rheumatology  9/10/2024  11:22 EDT

## 2024-09-11 ENCOUNTER — TELEPHONE (OUTPATIENT)
Dept: INFUSION THERAPY | Facility: HOSPITAL | Age: 64
End: 2024-09-11
Payer: COMMERCIAL

## 2024-09-13 ENCOUNTER — HOSPITAL ENCOUNTER (OUTPATIENT)
Dept: CT IMAGING | Facility: HOSPITAL | Age: 64
Discharge: HOME | End: 2024-09-13
Payer: COMMERCIAL

## 2024-09-13 VITALS
WEIGHT: 173.4 LBS | HEIGHT: 62 IN | HEART RATE: 58 BPM | OXYGEN SATURATION: 96 % | SYSTOLIC BLOOD PRESSURE: 137 MMHG | RESPIRATION RATE: 16 BRPM | DIASTOLIC BLOOD PRESSURE: 84 MMHG | BODY MASS INDEX: 31.91 KG/M2 | TEMPERATURE: 96.8 F

## 2024-09-13 DIAGNOSIS — R07.89 ATYPICAL CHEST PAIN: ICD-10-CM

## 2024-09-13 DIAGNOSIS — R94.39 ABNORMAL STRESS TEST: ICD-10-CM

## 2024-09-13 PROCEDURE — 25510000001 IOPAMIDOL PER 1 ML: Performed by: INTERNAL MEDICINE

## 2024-09-13 PROCEDURE — 75574 CT ANGIO HRT W/3D IMAGE: CPT

## 2024-09-13 RX ORDER — SODIUM CHLORIDE 0.9 % (FLUSH) 0.9 %
10 SYRINGE (ML) INJECTION EVERY 12 HOURS SCHEDULED
Status: DISCONTINUED | OUTPATIENT
Start: 2024-09-13 | End: 2024-09-14 | Stop reason: HOSPADM

## 2024-09-13 RX ORDER — METOPROLOL TARTRATE 1 MG/ML
5 INJECTION, SOLUTION INTRAVENOUS
Status: DISCONTINUED | OUTPATIENT
Start: 2024-09-13 | End: 2024-09-14 | Stop reason: HOSPADM

## 2024-09-13 RX ORDER — METOPROLOL TARTRATE 50 MG
50 TABLET ORAL
Status: DISCONTINUED | OUTPATIENT
Start: 2024-09-13 | End: 2024-09-14 | Stop reason: HOSPADM

## 2024-09-13 RX ORDER — NITROGLYCERIN 0.4 MG/1
0.8 TABLET SUBLINGUAL
Status: COMPLETED | OUTPATIENT
Start: 2024-09-13 | End: 2024-09-13

## 2024-09-13 RX ORDER — SODIUM CHLORIDE 9 MG/ML
40 INJECTION, SOLUTION INTRAVENOUS AS NEEDED
Status: DISCONTINUED | OUTPATIENT
Start: 2024-09-13 | End: 2024-09-14 | Stop reason: HOSPADM

## 2024-09-13 RX ORDER — METOPROLOL TARTRATE 50 MG
50 TABLET ORAL ONCE
Status: DISCONTINUED | OUTPATIENT
Start: 2024-09-13 | End: 2024-09-14 | Stop reason: HOSPADM

## 2024-09-13 RX ORDER — METOPROLOL TARTRATE 100 MG
200 TABLET ORAL ONCE
Status: DISCONTINUED | OUTPATIENT
Start: 2024-09-13 | End: 2024-09-14 | Stop reason: HOSPADM

## 2024-09-13 RX ORDER — IOPAMIDOL 755 MG/ML
100 INJECTION, SOLUTION INTRAVASCULAR
Status: COMPLETED | OUTPATIENT
Start: 2024-09-13 | End: 2024-09-13

## 2024-09-13 RX ORDER — SODIUM CHLORIDE 0.9 % (FLUSH) 0.9 %
10 SYRINGE (ML) INJECTION AS NEEDED
Status: DISCONTINUED | OUTPATIENT
Start: 2024-09-13 | End: 2024-09-14 | Stop reason: HOSPADM

## 2024-09-13 RX ORDER — NITROGLYCERIN 0.4 MG/1
0.4 TABLET SUBLINGUAL
Status: COMPLETED | OUTPATIENT
Start: 2024-09-13 | End: 2024-09-13

## 2024-09-13 RX ORDER — METOPROLOL TARTRATE 25 MG/1
25 TABLET, FILM COATED ORAL ONCE
Status: DISCONTINUED | OUTPATIENT
Start: 2024-09-13 | End: 2024-09-14 | Stop reason: HOSPADM

## 2024-09-13 RX ORDER — METOPROLOL TARTRATE 100 MG
100 TABLET ORAL ONCE
Status: DISCONTINUED | OUTPATIENT
Start: 2024-09-13 | End: 2024-09-14 | Stop reason: HOSPADM

## 2024-09-13 RX ORDER — LIDOCAINE HYDROCHLORIDE 10 MG/ML
0.5 INJECTION, SOLUTION EPIDURAL; INFILTRATION; INTRACAUDAL; PERINEURAL ONCE AS NEEDED
Status: DISCONTINUED | OUTPATIENT
Start: 2024-09-13 | End: 2024-09-14 | Stop reason: HOSPADM

## 2024-09-13 RX ADMIN — NITROGLYCERIN 0.8 MG: 0.4 TABLET SUBLINGUAL at 08:44

## 2024-09-13 RX ADMIN — IOPAMIDOL 70 ML: 755 INJECTION, SOLUTION INTRAVENOUS at 09:10

## 2024-09-23 ENCOUNTER — SPECIALTY PHARMACY (OUTPATIENT)
Age: 64
End: 2024-09-23
Payer: COMMERCIAL

## 2024-09-25 ENCOUNTER — LAB (OUTPATIENT)
Dept: LAB | Facility: HOSPITAL | Age: 64
End: 2024-09-25
Payer: COMMERCIAL

## 2024-09-25 DIAGNOSIS — M81.0 OSTEOPOROSIS, UNSPECIFIED OSTEOPOROSIS TYPE, UNSPECIFIED PATHOLOGICAL FRACTURE PRESENCE: ICD-10-CM

## 2024-09-25 DIAGNOSIS — Z79.899 HIGH RISK MEDICATION USE: ICD-10-CM

## 2024-09-25 PROCEDURE — 36415 COLL VENOUS BLD VENIPUNCTURE: CPT

## 2024-09-25 PROCEDURE — 82306 VITAMIN D 25 HYDROXY: CPT

## 2024-09-25 PROCEDURE — 80053 COMPREHEN METABOLIC PANEL: CPT

## 2024-09-26 LAB
25(OH)D3 SERPL-MCNC: 39.7 NG/ML (ref 30–100)
ALBUMIN SERPL-MCNC: 4.5 G/DL (ref 3.5–5.2)
ALBUMIN/GLOB SERPL: 2 G/DL
ALP SERPL-CCNC: 117 U/L (ref 39–117)
ALT SERPL W P-5'-P-CCNC: 44 U/L (ref 1–33)
ANION GAP SERPL CALCULATED.3IONS-SCNC: 11 MMOL/L (ref 5–15)
AST SERPL-CCNC: 29 U/L (ref 1–32)
BILIRUB SERPL-MCNC: 0.4 MG/DL (ref 0–1.2)
BUN SERPL-MCNC: 15 MG/DL (ref 8–23)
BUN/CREAT SERPL: 23.8 (ref 7–25)
CALCIUM SPEC-SCNC: 9.6 MG/DL (ref 8.6–10.5)
CHLORIDE SERPL-SCNC: 103 MMOL/L (ref 98–107)
CO2 SERPL-SCNC: 27 MMOL/L (ref 22–29)
CREAT SERPL-MCNC: 0.63 MG/DL (ref 0.57–1)
EGFRCR SERPLBLD CKD-EPI 2021: 100.4 ML/MIN/1.73
GLOBULIN UR ELPH-MCNC: 2.2 GM/DL
GLUCOSE SERPL-MCNC: 84 MG/DL (ref 65–99)
POTASSIUM SERPL-SCNC: 4 MMOL/L (ref 3.5–5.2)
PROT SERPL-MCNC: 6.7 G/DL (ref 6–8.5)
SODIUM SERPL-SCNC: 141 MMOL/L (ref 136–145)

## 2024-09-27 ENCOUNTER — CLINICAL SUPPORT (OUTPATIENT)
Age: 64
End: 2024-09-27
Payer: COMMERCIAL

## 2024-09-27 VITALS
DIASTOLIC BLOOD PRESSURE: 91 MMHG | BODY MASS INDEX: 32.3 KG/M2 | HEART RATE: 88 BPM | HEIGHT: 62 IN | WEIGHT: 175.5 LBS | SYSTOLIC BLOOD PRESSURE: 140 MMHG | TEMPERATURE: 98.2 F

## 2024-09-27 DIAGNOSIS — M81.0 OSTEOPOROSIS, UNSPECIFIED OSTEOPOROSIS TYPE, UNSPECIFIED PATHOLOGICAL FRACTURE PRESENCE: Primary | ICD-10-CM

## 2024-10-01 PROBLEM — M25.50 JOINT PAIN: Status: ACTIVE | Noted: 2024-10-01

## 2024-10-17 PROBLEM — E55.9 VITAMIN D DEFICIENCY: Status: ACTIVE | Noted: 2024-10-17

## 2024-10-17 PROBLEM — Z79.899 HIGH RISK MEDICATION USE: Status: ACTIVE | Noted: 2024-10-17

## 2024-10-17 PROBLEM — R53.83 OTHER FATIGUE: Status: ACTIVE | Noted: 2024-10-17

## 2024-10-17 PROBLEM — K50.919 CROHN'S DISEASE WITH COMPLICATION: Status: ACTIVE | Noted: 2024-10-17

## 2024-10-17 PROBLEM — M07.60 ENTEROPATHIC ARTHRITIS: Status: ACTIVE | Noted: 2024-10-17

## 2024-10-17 PROBLEM — K11.7 XEROSTOMIA: Status: ACTIVE | Noted: 2024-10-17

## 2024-10-17 NOTE — ASSESSMENT & PLAN NOTE
We do not have her current dexa but patient had T score of -4.1 in her spine.  She has suffered two compression fractures since 2015 . One at T7.  She took fosamax x several years and recently treated with Actonel for 7 years until 10/19.   Vit D level 54   Intact PTH, Osteocalcin, N-telopeptide normal. SPEP negative 2/2020  Agree with Prolia in view of compression fractures, T-scores and length of time on Bisphosphonates. After 3-5 years Bisphosphonates may become ineffective and could decrease Density so drug holidays are recommended.  Started Prolia 7/24/2020- last 10/13/21  Dr. Valderrama told her not to take Calcium  Dexa scan 3/22 PCP- Lumbar spine -2.7, Total L hip -0.2, L femoral neck -1.6, Total R hip 0, R femoral neck -1.1.        Plan:  Current calcium guidelines - 800-1000mg per day- higher doses can be linked to cardiac issues.   Continue Current Vitamin D dose.   Weight bearing exercising.  Dexa due March 2024- [facility].   CMP two weeks after Prolia.  Last Prolia 2/24

## 2024-10-17 NOTE — ASSESSMENT & PLAN NOTE
Sleep poorly .  Cannot stay asleep.  H/o D deficiency.  Flexeril - Hangover.  TSH Normal   Had Echo in June 2022 - EF 66-70%, LV borderline concentric hypertrophy with normal diastolic function. Negative Pulmonary HTN.  She is sleeping better        Plan:  Melatonin helps PRN.  I rec Tizanidine 2mg 1/2-1 in am and afternoon, 1-2 at HS.

## 2024-10-17 NOTE — ASSESSMENT & PLAN NOTE
Submandibular swelling on L and dry mouth.  Ssa/ssb negative in 5/22  The patient has had issues with nodular area under her submandibular.  It has thought to be salivary gland however she only has unilateral enlargement.  S/p ENT barbi BRAMBILA      Plan:  Biotene or ACT toothpaste.  Biotene or ACT mouth rinse.  Xylimelts - [employer], [facility] bedtime use.  Langeloth spray from amazon for mouth dryness (amazon)   Plenty of fluids.  If no Asthma or COPD there are two prescriptions available.  Minor salivary gland biopsy of the lip is gold standard for diagnosis.

## 2024-10-17 NOTE — ASSESSMENT & PLAN NOTE
She has had problems with abdominal pain and diarrhea since Age 22.  Episodes of ileitis.  Negative IBD panels.  Biopsy was consistent with Crohn's.  See's Dr. Dayan Guillen who put her on Pentasa and she thinks she has had some improvement.  Celiac Panel Negative.    No nsaids.  Mild sx intermittently      Plan:  As per Dr. Guillen.  She has a colonoscopy pending 12/24..

## 2024-10-17 NOTE — ASSESSMENT & PLAN NOTE
She has had joint pain since age 16.  Severe pain and swelling in the knees.  This has been intermittent.  Other joints that have bothered her include hips, R shoulder, ankles elbows, wrist, and pip joints.  Has had recurrent episodes of Plantar fasciitis that she relates to Levaquin use.  AM stiffness is only 15 minutes.  Positive thoracic pain in the area of her fracture. Negative lumbar pain.  Denies psoriasis but has had significant eczema.  Nsaids have caused significant colon inflammation.  SSA SSB Negative, CCP Negative,   HLAB27 Negative.   RF 3 IGG IGA and IGM all negative.   Violette by IFA negative 2/2020  Eval negative for RA. Pain may very well be related to her Inflammatory bowel disease.  Negative Psoriasis per biopsy.  Intermittent flaring associated with fatigue. Flares when bowel does intermittently- (? enteropathic)   XR hips consistent with very mild arthritis/very mild bilateral joint space narrowing.   Mild intermittent joint pain. Denies swelling.      Plan:  Tylenol Arthritis - ( Tylenol Brand or [employer] Generic )  OTC Turmeric  Compression gloves. Cbd, Nsaid creams, Biofreeze.  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 Flares when bowel does. Aggressive bowel treatment would likely improve arthritic sx.  No nsaids due to GI disease  Cymbalta, gabapentin or Lyrica would be an option for pain.   She will call as needed for knee injections

## 2024-10-17 NOTE — ASSESSMENT & PLAN NOTE
42.1 in 3/21  45.4 IN 10/21  61 in 4/23  57.3 in 8/23        Plan:  Continue current Vitamin D dose- 5,000 once a day.

## 2024-10-18 ENCOUNTER — OFFICE VISIT (OUTPATIENT)
Age: 64
End: 2024-10-18
Payer: COMMERCIAL

## 2024-10-18 VITALS
HEIGHT: 62 IN | BODY MASS INDEX: 31.96 KG/M2 | DIASTOLIC BLOOD PRESSURE: 90 MMHG | HEART RATE: 65 BPM | TEMPERATURE: 98 F | WEIGHT: 173.7 LBS | SYSTOLIC BLOOD PRESSURE: 140 MMHG

## 2024-10-18 DIAGNOSIS — M81.0 OSTEOPOROSIS, UNSPECIFIED OSTEOPOROSIS TYPE, UNSPECIFIED PATHOLOGICAL FRACTURE PRESENCE: ICD-10-CM

## 2024-10-18 DIAGNOSIS — K50.919 CROHN'S DISEASE WITH COMPLICATION, UNSPECIFIED GASTROINTESTINAL TRACT LOCATION: ICD-10-CM

## 2024-10-18 DIAGNOSIS — Z79.899 HIGH RISK MEDICATION USE: ICD-10-CM

## 2024-10-18 DIAGNOSIS — M07.60 ENTEROPATHIC ARTHRITIS: Primary | ICD-10-CM

## 2024-10-18 DIAGNOSIS — K11.7 XEROSTOMIA: ICD-10-CM

## 2024-10-18 DIAGNOSIS — E55.9 VITAMIN D DEFICIENCY: ICD-10-CM

## 2024-10-18 DIAGNOSIS — K50.10 CROHN'S DISEASE OF COLON WITHOUT COMPLICATION: ICD-10-CM

## 2024-10-18 DIAGNOSIS — R53.83 OTHER FATIGUE: ICD-10-CM

## 2024-10-18 NOTE — PROGRESS NOTES
[facility] [place] [facility] Office Follow Up Visit     Office Follow Up      Date: 10/18/2024   Patient Name: Antonieta Howe  MRN: [id number]  YOB: 1960    Referring Physician: Jennifer Maurer MD     Chief Complaint   Patient presents with    Follow-up       History of Present Illness: Antonieta Howe is a 64 y.o. female who is here today for follow up on   History of Present Illness  The patient presents for evaluation of multiple medical concerns.    She has been experiencing symptoms of diverticulitis, a condition for which she has been treated twice before by Dr. Valderrama. She was unable to see her primary care physician due to scheduling conflicts.    She reports morning stiffness lasting about 30 minutes and rates her pain as a 3 out of 10 at its worst. The pain is tolerable and is sometimes associated with gastrointestinal symptoms. Weather changes, particularly cold and rain, exacerbate her joint pain. She takes Tylenol Arthritis for her joint pain.    She is currently taking mesalamine for her Crohn's disease, which is stable. She is mindful of her diet and is scheduled for a colonoscopy in 12/2024.    Her last Prolia injection was administered on 09/27/2024. She reports increased hip pain compared to the past, which she attributes to weight gain. Her job requires her to sit all day, leaving her feeling drained by the end of the day.    She has been experiencing dry mouth despite drinking plenty of water and has seen an ENT specialist for this issue. She has been prescribed tizanidine but tries to avoid taking it frequently. She does not require any medication refills at this time. She is currently taking vitamin D 5000 IU daily.       Result Review :        Results  Laboratory Studies  Vitamin D level is 39.7. ALT level is 44.    Imaging  DEXA scan: T score of the lumbar spine was -3.1, the left total hip -0.6, the left femoral neck -1.7, the right total hip  -0.2 and the right femoral neck -1.1.          Subjective     Allergies   Allergen Reactions    Erythromycin GI Bleeding and GI Intolerance    Levaquin [Levofloxacin] Other (See Comments)     Patient states she gets really bad plantar fasciitis    Vancomycin Other (See Comments)     Red man syndrome    Bactrim [Sulfamethoxazole-Trimethoprim] Itching and Rash    Beeswax Rash    Other Itching and Rash     Kapidex    Zosyn [Piperacillin Sod-Tazobactam So] Rash         Current Outpatient Medications:     albuterol (PROVENTIL) 2.5 MG/0.5ML nebulizer solution, Take 2.5 mg by nebulization Every 4 (Four) Hours As Needed for Wheezing., Disp: 20 mL, Rfl: 0    albuterol sulfate  (90 Base) MCG/ACT inhaler, Inhale 2 puffs Every 4 (Four) Hours As Needed for Wheezing., Disp: 18 g, Rfl: 0    azelastine (ASTEPRO) 0.15 % solution nasal spray, 1 spray into the nostril(s) as directed by provider 2 (Two) Times a Day., Disp: 30 mL, Rfl: 10    betamethasone, augmented, (DIPROLENE) 0.05 % cream, Apply topically to the appropriate area as directed 2 (Two) Times a Day for 14 days, then decrease to 2-3 times weekly, Disp: 50 g, Rfl: 1    clopidogrel (PLAVIX) 75 MG tablet, Take 1 tablet by mouth Daily., Disp: 90 tablet, Rfl: 0    Coenzyme Q10 (COQ10) 200 MG capsule, Take 1 capsule by mouth Take As Directed., Disp: , Rfl:     denosumab (Prolia) 60 MG/ML solution prefilled syringe syringe, Inject 1 mL under the skin into the appropriate area as directed Every 6 (Six) Months., Disp: 1 mL, Rfl: 0    Dietary Management Product (Vasculera) tablet, Vasculera, Disp: , Rfl:     esomeprazole (nexIUM) 40 MG capsule, Take 1 capsule by mouth 2 (Two) Times a Day., Disp: 90 capsule, Rfl: 0    famotidine (PEPCID) 40 MG tablet, Take 1 tablet by mouth 2 (Two) Times a Day., Disp: 90 tablet, Rfl: 0    ipratropium-albuterol (Combivent Respimat)  MCG/ACT inhaler, Inhale 1 puff 4 (Four) Times a Day., Disp: 12 g, Rfl: 1    ketotifen (Zaditor) 0.025 %  ophthalmic solution, Apply 1 drop to eye(s) as directed by provider 2 (Two) Times a Day., Disp: 5 mL, Rfl: 5    mesalamine (DELZICOL) 400 MG capsule delayed-release delayed release capsule, Take 2 capsules by mouth 2 Times a Day., Disp: 360 capsule, Rfl: 5    methocarbamol (ROBAXIN) 500 MG tablet, Take 1 tablet by mouth Every 6-8  Hours As Needed., Disp: 30 tablet, Rfl: 1    nebivolol (Bystolic) 5 MG tablet, Take 1 tablet by mouth Daily., Disp: 90 tablet, Rfl: 0    NIACIN PO, Take  by mouth., Disp: , Rfl:     Probiotic Product (PROBIOTIC ADVANCED PO), Take  by mouth Daily., Disp: , Rfl:     rosuvastatin (Crestor) 10 MG tablet, Take 1 tablet by mouth Daily., Disp: 90 tablet, Rfl: 0    tiZANidine (ZANAFLEX) 2 MG tablet, take 0.5-1 tablet in the am and afternoon and 1-2 at bedtime, Disp: 90 tablet, Rfl: 3    vitamin D3 125 MCG (5000 UT) capsule capsule, Take  by mouth., Disp: , Rfl:     calcium carbonate (TUMS) 500 MG chewable tablet, Chew 1 tablet Daily. (Patient not taking: Reported on 10/18/2024), Disp: , Rfl:     Calcium Polycarbophil (FIBER-CAPS PO), Take  by mouth Daily. (Patient not taking: Reported on 10/18/2024), Disp: , Rfl:     metoprolol tartrate (LOPRESSOR) 50 MG tablet, Measure heart rate and take 1 tablet if heart rate lower than 70, take 2 tablets if heart rate 70 or higher. Do this at bedtime the night before the CT Scan and 1 hour prior to CT Scan appointment (Patient not taking: Reported on 10/18/2024), Disp: 4 tablet, Rfl: 0    Past Medical History:   Diagnosis Date    Achilles tendonitis     Arthritis     Asthma     Crohn disease     Diverticulitis 2019    Esophagitis     Gastrointestinal disorder     GERD (gastroesophageal reflux disease)     Headache     Hepatitis     Hepatitis A     High cholesterol     Hyperlipidemia     Osteoarthritis     Osteoporosis     Plantar fasciitis     Skin disorder     Splenic infarct         Past Surgical History:   Procedure Laterality Date     SECTION       CHOLECYSTECTOMY  2004    ESSURE TUBAL LIGATION      HYSTERECTOMY      LAPAROSCOPIC TUBAL LIGATION      LAPAROSCOPY DIAGNOSTIC / BIOPSY / ASPIRATION / LYSIS      diagnostic laparoscopy for recurrent pregnancy loss    OOPHORECTOMY      SPLENECTOMY  2004    splenic infarct    SPLENECTOMY         Family History   Problem Relation Age of Onset    Heart attack Mother     Hypertension Mother     Stroke Mother     Hyperlipidemia Mother     Cancer Mother     Stroke Father     Hypertension Brother     Stroke Brother     Crohn's disease Brother     Arthritis Maternal Grandmother     Heart disease Maternal Grandmother     Heart failure Maternal Grandmother     Dementia Maternal Uncle     Macular degeneration Maternal Uncle     Breast cancer Neg Hx     Ovarian cancer Neg Hx         Social History     Socioeconomic History    Marital status:    Tobacco Use    Smoking status: Former     Current packs/day: 0.00     Average packs/day: 0.5 packs/day for 10.0 years (5.0 ttl pk-yrs)     Types: Cigarettes     Start date:      Quit date:      Years since quittin.8    Smokeless tobacco: Never   Vaping Use    Vaping status: Never Used   Substance and Sexual Activity    Alcohol use: No     Comment: never a heavy drinker    Drug use: No    Sexual activity: Defer       Review of Systems   Constitutional:  Negative for fatigue and fever.   HENT:  Negative for mouth sores, nosebleeds, swollen glands and trouble swallowing.         Dry mouth  Nasal drainage     Eyes:  Negative for blurred vision, double vision, photophobia, pain and visual disturbance.   Respiratory:  Negative for apnea, cough, choking and shortness of breath.    Cardiovascular:  Negative for chest pain, palpitations and leg swelling.        Edema  Varicose veins     Gastrointestinal:  Positive for abdominal pain. Negative for blood in stool, constipation, diarrhea, nausea, vomiting and GERD.        Abdominal cramping  hemorrhoids     Endocrine:  "Negative for cold intolerance, heat intolerance, polydipsia, polyphagia and polyuria.   Genitourinary:  Positive for urinary incontinence. Negative for difficulty urinating, dysuria, genital sores and hematuria.   Musculoskeletal:  Positive for arthralgias and back pain. Negative for gait problem, joint swelling, myalgias, neck pain, neck stiffness and bursitis.        Morning stiffness     Skin:  Negative for rash.   Allergic/Immunologic: Negative for environmental allergies and food allergies.   Neurological:  Negative for dizziness, tremors, seizures, syncope, weakness, numbness, headache, memory problem and confusion.   Hematological:  Negative for adenopathy. Does not bruise/bleed easily.   Psychiatric/Behavioral:  Positive for sleep disturbance. Negative for suicidal ideas, depressed mood and stress. The patient is not nervous/anxious.       I have reviewed and updated the patient's chief complaint, history of present illness, review of systems, past medical history, surgical history, family history, social history, medications and allergy list as appropriate.     Objective    Vital Signs:   Vitals:    10/18/24 0808   Weight: 78.8 kg (173 lb 11.2 oz)   Height: 157.5 cm (62\")     Antonieta Howe reports a pain score of .  Given her pain assessment as noted, treatment options were discussed and the following options were decided upon as a follow-up plan to address the patient's pain: .      Body mass index is 31.77 kg/m².         Physical Exam   Physical Exam  Patient is an alert female, no acute distress.  Head is atraumatic, normocephalic. Pupils are equal and round. Extraocular muscles intact. Oropharynx negative.  Lungs are clear.  Heart is regular without rubs, gallops, or murmurs.  Thoracic and lumbar spine are nontender. Trapezius muscles are mildly tender. Shoulders are tender. No synovitis in the hand. Slight crepitus of the left knee as well as the right knee. No synovitis of the lower extremity " joints.  Skin is negative for any acute rashes.    Physical Exam  There is currently no information documented on the [facility]. Go to the Rheumatology activity and complete the [facility] joint exam.     Results Review:   Imaging Results (Last 24 Hours)       ** No results found for the last 24 hours. **            Procedures    Assessment / Plan    Assessment/Plan:   Diagnoses and all orders for this visit:    1. Enteropathic arthritis (Primary)  Assessment & Plan:  She has had joint pain since age 16.  Severe pain and swelling in the knees.  This has been intermittent.  Other joints that have bothered her include hips, R shoulder, ankles elbows, wrist, and pip joints.  Has had recurrent episodes of Plantar fasciitis that she relates to Levaquin use.  AM stiffness is only 15 minutes.  Positive thoracic pain in the area of her fracture. Negative lumbar pain.  Denies psoriasis but has had significant eczema.  Nsaids have caused significant colon inflammation.  SSA SSB Negative, CCP Negative,   HLAB27 Negative.   RF 3 IGG IGA and IGM all negative.   Violette by IFA negative 2/2020  Eval negative for RA. Pain may very well be related to her Inflammatory bowel disease.  Negative Psoriasis per biopsy.  Intermittent flaring associated with fatigue. Flares when bowel does intermittently- (? enteropathic)   XR hips consistent with very mild arthritis/very mild bilateral joint space narrowing.   Mild intermittent joint pain. Denies swelling.      Plan:  Tylenol Arthritis - ( Tylenol Brand or [employer] Generic )  OTC Turmeric  Compression gloves. Cbd, Nsaid creams, Biofreeze.  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 Flares when bowel does. Aggressive bowel treatment would likely improve arthritic sx.  No nsaids due to GI disease  Cymbalta, gabapentin or Lyrica would be an option for pain.   She will call as needed for knee injections      2. Crohn's disease with complication, unspecified gastrointestinal tract location    3. Osteoporosis,  unspecified osteoporosis type, unspecified pathological fracture presence  Assessment & Plan:  We do not have her current dexa but patient had T score of -4.1 in her spine.  She has suffered two compression fractures since 2015 . One at T7.  She took fosamax x several years and recently treated with Actonel for 7 years until 10/19.   Vit D level 54   Intact PTH, Osteocalcin, N-telopeptide normal. SPEP negative 2/2020  Agree with Prolia in view of compression fractures, T-scores and length of time on Bisphosphonates. After 3-5 years Bisphosphonates may become ineffective and could decrease Density so drug holidays are recommended.  Started Prolia 7/24/2020- last 10/13/21  Dr. Valderrama told her not to take Calcium  Dexa scan 3/22 PCP- Lumbar spine -2.7, Total L hip -0.2, L femoral neck -1.6, Total R hip 0, R femoral neck -1.1.        Plan:  Current calcium guidelines - 800-1000mg per day- higher doses can be linked to cardiac issues.   Continue Current Vitamin D dose.   Weight bearing exercising.  Dexa due March 2024- [facility].   CMP two weeks after Prolia.  Last Prolia 2/24      4. High risk medication use    5. Other fatigue  Assessment & Plan:  Sleep poorly .  Cannot stay asleep.  H/o D deficiency.  Flexeril - Hangover.  TSH Normal   Had Echo in June 2022 - EF 66-70%, LV borderline concentric hypertrophy with normal diastolic function. Negative Pulmonary HTN.  She is sleeping better        Plan:  Melatonin helps PRN.  I rec Tizanidine 2mg 1/2-1 in am and afternoon, 1-2 at HS.      6. Vitamin D deficiency  Assessment & Plan:  42.1 in 3/21  45.4 IN 10/21  61 in 4/23  57.3 in 8/23        Plan:  Continue current Vitamin D dose- 5,000 once a day.      7. Xerostomia  Assessment & Plan:  Submandibular swelling on L and dry mouth.  Ssa/ssb negative in 5/22  The patient has had issues with nodular area under her submandibular.  It has thought to be salivary gland however she only has unilateral enlargement.  S/p ENT eval  Dr. BRAMBILA      Plan:  Biotene or ACT toothpaste.  Biotene or ACT mouth rinse.  Xylimelts - [employer], [employer] bedtime use.  [place] spray from [employer] for mouth dryness (amazon)   Plenty of fluids.  If no Asthma or COPD there are two prescriptions available.  Minor salivary gland biopsy of the lip is gold standard for diagnosis.      8. Crohn's disease of colon without complication  Assessment & Plan:  She has had problems with abdominal pain and diarrhea since Age 22.  Episodes of ileitis.  Negative IBD panels.  Biopsy was consistent with Crohn's.  See's Dr. Dayan Guillen who put her on Pentasa and she thinks she has had some improvement.  Celiac Panel Negative.    No nsaids.  Mild sx intermittently      Plan:  As per Dr. Guillen.  She has a colonoscopy pending 12/24..            Assessment & Plan  1. Osteoporosis.  Her overall health appears stable. The hip pain she experiences could be attributed to degenerative changes or neuropathic arthritis, as osteoporosis and osteopenia are not typically associated with pain. She is advised to ensure adequate intake of calcium with vitamin D and to continue her Prolia treatment. Weightbearing exercises are recommended if feasible. A repeat DEXA scan is suggested in 2 years using the same machine for consistency. Regular hydration is also encouraged.    2. Crohn's Disease.  Her Crohn's disease is currently stable. She is advised to continue taking mesalamine and to monitor her diet. She is scheduled for her next colonoscopy in December with Dr. Garcia.    3. Joint Pain.  She reports joint pain that is sometimes associated with gastrointestinal symptoms and is affected by weather changes. Morning stiffness lasts about 30 minutes, and her pain level is 3 out of 10 at its worst. She uses Tylenol Arthritis for pain management, which appears to be effective. Shoulders are tender, and there is slight crepitus in both knees. No synovitis is observed in the hand or lower extremity joints.    4. Dry  Mouth.  She experiences dry mouth and manages it by drinking a lot of water. She has seen an ENT for her salivary glands. No new medications are needed at this time.    5. Elevated ALT.  Her ALT level was elevated at 44 during her September 2024 blood work. Kidney function is good, and this elevation may be a fluke. This will be monitored, and she will discuss it with her primary care physician in a month.    6. Health Maintenance.  Her last Prolia shot was on September 27, 2024. She is scheduled for her next shot at the end of March 2025. Her vitamin D level is normal at 39.7, and she continues to take 5000 units daily.     Follow-up  Return in 6 months for follow up.        Follow Up:   No follow-ups on file.       Brigitte Alva MD  [facility]     Encounter Administratively Closed by Health Information Management

## 2024-11-15 ENCOUNTER — OFFICE VISIT (OUTPATIENT)
Dept: INTERNAL MEDICINE | Facility: CLINIC | Age: 64
End: 2024-11-15
Payer: COMMERCIAL

## 2024-11-15 ENCOUNTER — LAB (OUTPATIENT)
Dept: LAB | Facility: HOSPITAL | Age: 64
End: 2024-11-15
Payer: COMMERCIAL

## 2024-11-15 VITALS
OXYGEN SATURATION: 97 % | WEIGHT: 174.3 LBS | BODY MASS INDEX: 32.07 KG/M2 | TEMPERATURE: 98.6 F | SYSTOLIC BLOOD PRESSURE: 124 MMHG | DIASTOLIC BLOOD PRESSURE: 78 MMHG | HEART RATE: 90 BPM | RESPIRATION RATE: 18 BRPM | HEIGHT: 62 IN

## 2024-11-15 DIAGNOSIS — R13.19 ESOPHAGEAL DYSPHAGIA: ICD-10-CM

## 2024-11-15 DIAGNOSIS — E78.2 MIXED HYPERLIPIDEMIA: ICD-10-CM

## 2024-11-15 DIAGNOSIS — Z87.19 HISTORY OF BARRETT'S ESOPHAGUS: ICD-10-CM

## 2024-11-15 DIAGNOSIS — Z86.718 HISTORY OF BLOOD CLOTS: ICD-10-CM

## 2024-11-15 DIAGNOSIS — R74.01 ELEVATED ALT MEASUREMENT: ICD-10-CM

## 2024-11-15 DIAGNOSIS — K44.9 HIATAL HERNIA: ICD-10-CM

## 2024-11-15 DIAGNOSIS — K50.919 CROHN'S DISEASE WITH COMPLICATION, UNSPECIFIED GASTROINTESTINAL TRACT LOCATION: ICD-10-CM

## 2024-11-15 DIAGNOSIS — I34.1 MITRAL VALVE PROLAPSE: ICD-10-CM

## 2024-11-15 DIAGNOSIS — M07.60 ENTEROPATHIC ARTHRITIS: ICD-10-CM

## 2024-11-15 DIAGNOSIS — K50.10 CROHN'S DISEASE OF COLON WITHOUT COMPLICATION: ICD-10-CM

## 2024-11-15 DIAGNOSIS — Z90.81 ACQUIRED ASPLENIA: ICD-10-CM

## 2024-11-15 DIAGNOSIS — E55.9 VITAMIN D DEFICIENCY: ICD-10-CM

## 2024-11-15 DIAGNOSIS — Z91.09 ENVIRONMENTAL ALLERGIES: ICD-10-CM

## 2024-11-15 DIAGNOSIS — Z87.81 HISTORY OF COMPRESSION FRACTURE OF SPINE: ICD-10-CM

## 2024-11-15 DIAGNOSIS — J45.20 MILD INTERMITTENT ASTHMA WITHOUT COMPLICATION: ICD-10-CM

## 2024-11-15 DIAGNOSIS — L30.9 ECZEMA OF SCALP: ICD-10-CM

## 2024-11-15 DIAGNOSIS — Z00.00 ANNUAL PHYSICAL EXAM: ICD-10-CM

## 2024-11-15 DIAGNOSIS — L30.1 DYSHIDROTIC ECZEMA: ICD-10-CM

## 2024-11-15 DIAGNOSIS — R73.03 PREDIABETES: ICD-10-CM

## 2024-11-15 DIAGNOSIS — M81.0 OSTEOPOROSIS, UNSPECIFIED OSTEOPOROSIS TYPE, UNSPECIFIED PATHOLOGICAL FRACTURE PRESENCE: ICD-10-CM

## 2024-11-15 DIAGNOSIS — Z00.00 ANNUAL PHYSICAL EXAM: Primary | ICD-10-CM

## 2024-11-15 DIAGNOSIS — K21.9 CHRONIC GERD: ICD-10-CM

## 2024-11-15 PROBLEM — Z87.09 HISTORY OF ASTHMA: Status: RESOLVED | Noted: 2022-06-10 | Resolved: 2024-11-15

## 2024-11-15 LAB
25(OH)D3 SERPL-MCNC: 35.4 NG/ML (ref 30–100)
ALBUMIN SERPL-MCNC: 4.3 G/DL (ref 3.5–5.2)
ALBUMIN/GLOB SERPL: 2 G/DL
ALP SERPL-CCNC: 105 U/L (ref 39–117)
ALT SERPL W P-5'-P-CCNC: 22 U/L (ref 1–33)
ANION GAP SERPL CALCULATED.3IONS-SCNC: 10 MMOL/L (ref 5–15)
AST SERPL-CCNC: 20 U/L (ref 1–32)
BILIRUB CONJ SERPL-MCNC: <0.2 MG/DL (ref 0–0.3)
BILIRUB SERPL-MCNC: 0.5 MG/DL (ref 0–1.2)
BUN SERPL-MCNC: 12 MG/DL (ref 8–23)
BUN/CREAT SERPL: 18.5 (ref 7–25)
CALCIUM SPEC-SCNC: 9.4 MG/DL (ref 8.6–10.5)
CHLORIDE SERPL-SCNC: 104 MMOL/L (ref 98–107)
CHOLEST SERPL-MCNC: 168 MG/DL (ref 0–200)
CO2 SERPL-SCNC: 26 MMOL/L (ref 22–29)
CREAT SERPL-MCNC: 0.65 MG/DL (ref 0.57–1)
DEPRECATED RDW RBC AUTO: 46.8 FL (ref 37–54)
EGFRCR SERPLBLD CKD-EPI 2021: 99.7 ML/MIN/1.73
ERYTHROCYTE [DISTWIDTH] IN BLOOD BY AUTOMATED COUNT: 15.1 % (ref 12.3–15.4)
GLOBULIN UR ELPH-MCNC: 2.2 GM/DL
GLUCOSE SERPL-MCNC: 83 MG/DL (ref 65–99)
HBA1C MFR BLD: 5.9 % (ref 4.8–5.6)
HCT VFR BLD AUTO: 38.6 % (ref 34–46.6)
HDLC SERPL-MCNC: 62 MG/DL (ref 40–60)
HGB BLD-MCNC: 11.8 G/DL (ref 12–15.9)
LDLC SERPL CALC-MCNC: 86 MG/DL (ref 0–100)
LDLC/HDLC SERPL: 1.35 {RATIO}
MCH RBC QN AUTO: 26.2 PG (ref 26.6–33)
MCHC RBC AUTO-ENTMCNC: 30.6 G/DL (ref 31.5–35.7)
MCV RBC AUTO: 85.8 FL (ref 79–97)
PLATELET # BLD AUTO: 422 10*3/MM3 (ref 140–450)
PMV BLD AUTO: 8.8 FL (ref 6–12)
POTASSIUM SERPL-SCNC: 4 MMOL/L (ref 3.5–5.2)
PROT SERPL-MCNC: 6.5 G/DL (ref 6–8.5)
RBC # BLD AUTO: 4.5 10*6/MM3 (ref 3.77–5.28)
SODIUM SERPL-SCNC: 140 MMOL/L (ref 136–145)
TRIGL SERPL-MCNC: 110 MG/DL (ref 0–150)
TSH SERPL DL<=0.05 MIU/L-ACNC: 2.22 UIU/ML (ref 0.27–4.2)
VLDLC SERPL-MCNC: 20 MG/DL (ref 5–40)
WBC NRBC COR # BLD AUTO: 6.84 10*3/MM3 (ref 3.4–10.8)

## 2024-11-15 PROCEDURE — 82248 BILIRUBIN DIRECT: CPT

## 2024-11-15 PROCEDURE — 36415 COLL VENOUS BLD VENIPUNCTURE: CPT

## 2024-11-15 PROCEDURE — 80061 LIPID PANEL: CPT

## 2024-11-15 PROCEDURE — 80050 GENERAL HEALTH PANEL: CPT

## 2024-11-15 PROCEDURE — 82306 VITAMIN D 25 HYDROXY: CPT

## 2024-11-15 PROCEDURE — 83036 HEMOGLOBIN GLYCOSYLATED A1C: CPT

## 2024-11-15 RX ORDER — NEBIVOLOL 5 MG/1
5 TABLET ORAL DAILY
Qty: 90 TABLET | Refills: 3 | Status: SHIPPED | OUTPATIENT
Start: 2024-11-15

## 2024-11-15 RX ORDER — ROSUVASTATIN CALCIUM 10 MG/1
10 TABLET, COATED ORAL DAILY
Qty: 90 TABLET | Refills: 3 | Status: SHIPPED | OUTPATIENT
Start: 2024-11-15

## 2024-11-15 RX ORDER — FAMOTIDINE 40 MG/1
40 TABLET, FILM COATED ORAL 2 TIMES DAILY
Qty: 180 TABLET | Refills: 3 | Status: SHIPPED | OUTPATIENT
Start: 2024-11-15

## 2024-11-15 RX ORDER — ESOMEPRAZOLE MAGNESIUM 40 MG/1
40 CAPSULE, DELAYED RELEASE ORAL 2 TIMES DAILY
Qty: 180 CAPSULE | Refills: 3 | Status: SHIPPED | OUTPATIENT
Start: 2024-11-15

## 2024-11-15 RX ORDER — CLOPIDOGREL BISULFATE 75 MG/1
75 TABLET ORAL DAILY
Qty: 90 TABLET | Refills: 3 | Status: SHIPPED | OUTPATIENT
Start: 2024-11-15

## 2024-11-15 NOTE — PROGRESS NOTES
Internal Medicine Annual Exam  Antonieta Howe is a 64 y.o. female who presents today for an annual exam and with concerns as outlined below.    Chief Complaint  Chief Complaint   Patient presents with   • Annual Exam   • Difficulty Swallowing        HPI  Ms. Howe comes in today for her physical. She is doing well overall. Notes issues with dysphagia. Past swallow study without major abnormality. Feels something is stuck in her throat at times. Can aspirate on saliva. She has not had an EGD in 5+ years. Does have a history of barretts esophagus that had resolved on last EGD. She does have colonoscopy scheduled 12/13 with Dr. Guillen. She notes labs in September with rheumatology. ALT elevated. Fatty liver noted on cardiac CTA. She is working on diet and being more active. Has sedentary job. She otherwise denies any changes to her medications. She is up to date with mammogram, GYN exam. Has had a flu shot. Other vaccines declined today. Denies use of tobacco, ecigarettes, illicit drugs, and alcohol.         Review of Systems  Review of Systems   HENT:  Positive for trouble swallowing.    Respiratory: Negative.     Cardiovascular: Negative.    Gastrointestinal:  Positive for blood in stool (occasional).   Genitourinary: Negative.    Musculoskeletal:  Positive for arthralgias and back pain.   Skin: Negative.    Neurological: Negative.    Psychiatric/Behavioral: Negative.          Past Medical History  Past Medical History:   Diagnosis Date   • Achilles tendonitis    • Arthritis    • Asthma    • Crohn disease    • Diverticulitis 01/23/2019   • Diverticulitis    • Esophagitis    • Gastrointestinal disorder    • GERD (gastroesophageal reflux disease)    • Headache    • Hepatitis    • Hepatitis A    • High cholesterol    • Hyperlipidemia    • Osteoarthritis    • Osteoporosis    • Plantar fasciitis    • Skin disorder    • Splenic infarct         Surgical History  Past Surgical History:   Procedure Laterality Date   •   SECTION     • CHOLECYSTECTOMY     • ESSURE TUBAL LIGATION     • HYSTERECTOMY     • LAPAROSCOPIC TUBAL LIGATION     • LAPAROSCOPY DIAGNOSTIC / BIOPSY / ASPIRATION / LYSIS      diagnostic laparoscopy for recurrent pregnancy loss   • OOPHORECTOMY     • SPLENECTOMY      splenic infarct   • SPLENECTOMY          Family History  Family History   Problem Relation Age of Onset   • Heart attack Mother    • Hypertension Mother    • Stroke Mother    • Hyperlipidemia Mother    • Stroke Father    • Hypertension Brother    • Peripheral vascular disease Brother         arterial occlusion   • Basal cell carcinoma Brother    • Squamous cell carcinoma Brother    • Stroke Brother    • Crohn's disease Brother    • Arthritis Maternal Grandmother    • Heart disease Maternal Grandmother    • Heart failure Maternal Grandmother    • Dementia Maternal Uncle    • Macular degeneration Maternal Uncle    • Breast cancer Neg Hx    • Ovarian cancer Neg Hx         Social History  Social History     Socioeconomic History   • Marital status:    Tobacco Use   • Smoking status: Former     Current packs/day: 0.00     Average packs/day: 0.5 packs/day for 10.0 years (5.0 ttl pk-yrs)     Types: Cigarettes     Start date:      Quit date:      Years since quittin.8     Passive exposure: Past   • Smokeless tobacco: Never   Vaping Use   • Vaping status: Never Used   Substance and Sexual Activity   • Alcohol use: No     Comment: never a heavy drinker   • Drug use: No   • Sexual activity: Defer        Current Medications  Current Outpatient Medications on File Prior to Visit   Medication Sig Dispense Refill   • albuterol (PROVENTIL) 2.5 MG/0.5ML nebulizer solution Take 2.5 mg by nebulization Every 4 (Four) Hours As Needed for Wheezing. 20 mL 0   • albuterol sulfate  (90 Base) MCG/ACT inhaler Inhale 2 puffs Every 4 (Four) Hours As Needed for Wheezing. 18 g 0   • azelastine (ASTEPRO) 0.15 % solution nasal spray 1  spray into the nostril(s) as directed by provider 2 (Two) Times a Day. 30 mL 10   • betamethasone, augmented, (DIPROLENE) 0.05 % cream Apply topically to the appropriate area as directed 2 (Two) Times a Day for 14 days, then decrease to 2-3 times weekly 50 g 1   • Calcium Polycarbophil (FIBER-CAPS PO) Take  by mouth Daily.     • Coenzyme Q10 (COQ10) 200 MG capsule Take 1 capsule by mouth Take As Directed.     • denosumab (Prolia) 60 MG/ML solution prefilled syringe syringe Inject 1 mL under the skin into the appropriate area as directed Every 6 (Six) Months. 1 mL 0   • Dietary Management Product (Vasculera) tablet Vasculera     • ipratropium-albuterol (Combivent Respimat)  MCG/ACT inhaler Inhale 1 puff 4 (Four) Times a Day. 12 g 1   • ketotifen (Zaditor) 0.025 % ophthalmic solution Apply 1 drop to eye(s) as directed by provider 2 (Two) Times a Day. 5 mL 5   • mesalamine (DELZICOL) 400 MG capsule delayed-release delayed release capsule Take 2 capsules by mouth 2 (Two) Times a Day. 360 capsule 5   • methocarbamol (ROBAXIN) 500 MG tablet Take 1 tablet by mouth Every 6-8  Hours As Needed. 30 tablet 1   • NIACIN PO Take  by mouth.     • Probiotic Product (PROBIOTIC ADVANCED PO) Take  by mouth Daily.     • tiZANidine (ZANAFLEX) 2 MG tablet take 0.5-1 tablet in the am and afternoon and 1-2 at bedtime 90 tablet 3   • vitamin D3 125 MCG (5000 UT) capsule capsule Take  by mouth.     • [DISCONTINUED] clopidogrel (PLAVIX) 75 MG tablet Take 1 tablet by mouth Daily. 90 tablet 0   • [DISCONTINUED] esomeprazole (nexIUM) 40 MG capsule Take 1 capsule by mouth 2 (Two) Times a Day. 90 capsule 0   • [DISCONTINUED] famotidine (PEPCID) 40 MG tablet Take 1 tablet by mouth 2 (Two) Times a Day. 90 tablet 0   • [DISCONTINUED] nebivolol (Bystolic) 5 MG tablet Take 1 tablet by mouth Daily. 90 tablet 0   • [DISCONTINUED] rosuvastatin (Crestor) 10 MG tablet Take 1 tablet by mouth Daily. 90 tablet 0   • [DISCONTINUED] calcium carbonate  "(TUMS) 500 MG chewable tablet Chew 1 tablet Daily. (Patient not taking: Reported on 11/15/2024)     • [DISCONTINUED] metoprolol tartrate (LOPRESSOR) 50 MG tablet Measure heart rate and take 1 tablet if heart rate lower than 70, take 2 tablets if heart rate 70 or higher. Do this at bedtime the night before the CT Scan and 1 hour prior to CT Scan appointment (Patient not taking: Reported on 11/15/2024) 4 tablet 0   • [DISCONTINUED] montelukast (Singulair) 10 MG tablet Take 1 tablet by mouth Every Morning. (Patient taking differently: Take 10 mg by mouth Daily As Needed.) 30 tablet 1     No current facility-administered medications on file prior to visit.       Allergies  Allergies   Allergen Reactions   • Erythromycin GI Intolerance   • Levaquin [Levofloxacin] Other (See Comments)     Patient states she gets really bad plantar fasciitis   • Vancomycin Other (See Comments)     Red man syndrome   • Bactrim [Sulfamethoxazole-Trimethoprim] Itching and Rash   • Beeswax Rash   • Other Itching and Rash     Kapidex   • Zosyn [Piperacillin Sod-Tazobactam So] Rash     Possibly due to vancomycin and not zosyn, administered concurrently.        Objective  Visit Vitals  /78 (BP Location: Left arm, Patient Position: Sitting, Cuff Size: Adult)   Pulse 90   Temp 98.6 °F (37 °C) (Oral)   Resp 18   Ht 157.5 cm (62.01\")   Wt 79.1 kg (174 lb 4.8 oz)   LMP  (LMP Unknown)   SpO2 97%   BMI 31.87 kg/m²        Physical Exam  Physical Exam  Vitals and nursing note reviewed.   Constitutional:       General: She is not in acute distress.     Appearance: She is well-developed. She is obese. She is not ill-appearing, toxic-appearing or diaphoretic.   HENT:      Head: Normocephalic and atraumatic.      Right Ear: Tympanic membrane, ear canal and external ear normal.      Left Ear: Tympanic membrane, ear canal and external ear normal.      Nose: Nose normal.   Eyes:      General: No scleral icterus.     Conjunctiva/sclera: Conjunctivae normal. "   Neck:      Thyroid: No thyromegaly.   Cardiovascular:      Rate and Rhythm: Normal rate and regular rhythm.      Heart sounds: Normal heart sounds. No murmur heard.  Pulmonary:      Effort: Pulmonary effort is normal. No respiratory distress.      Breath sounds: Normal breath sounds.   Abdominal:      General: There is no distension.      Palpations: Abdomen is soft. There is no mass.      Tenderness: There is no abdominal tenderness. There is no right CVA tenderness or left CVA tenderness.   Musculoskeletal:         General: No deformity.      Cervical back: Neck supple.      Right lower leg: No edema.      Left lower leg: No edema.   Lymphadenopathy:      Cervical: No cervical adenopathy.   Skin:     General: Skin is warm and dry.      Findings: No rash.   Neurological:      Mental Status: She is alert and oriented to person, place, and time. Mental status is at baseline.      Gait: Gait normal.   Psychiatric:         Mood and Affect: Mood normal.         Behavior: Behavior normal.         Thought Content: Thought content normal.         Judgment: Judgment normal.        Results  Results for orders placed or performed in visit on 09/25/24   Comprehensive Metabolic Panel    Collection Time: 09/25/24  6:06 PM    Specimen: Blood   Result Value Ref Range    Glucose 84 65 - 99 mg/dL    BUN 15 8 - 23 mg/dL    Creatinine 0.63 0.57 - 1.00 mg/dL    Sodium 141 136 - 145 mmol/L    Potassium 4.0 3.5 - 5.2 mmol/L    Chloride 103 98 - 107 mmol/L    CO2 27.0 22.0 - 29.0 mmol/L    Calcium 9.6 8.6 - 10.5 mg/dL    Total Protein 6.7 6.0 - 8.5 g/dL    Albumin 4.5 3.5 - 5.2 g/dL    ALT (SGPT) 44 (H) 1 - 33 U/L    AST (SGOT) 29 1 - 32 U/L    Alkaline Phosphatase 117 39 - 117 U/L    Total Bilirubin 0.4 0.0 - 1.2 mg/dL    Globulin 2.2 gm/dL    A/G Ratio 2.0 g/dL    BUN/Creatinine Ratio 23.8 7.0 - 25.0    Anion Gap 11.0 5.0 - 15.0 mmol/L    eGFR 100.4 >60.0 mL/min/1.73   Vitamin D 25 Hydroxy    Collection Time: 09/25/24  6:06 PM     Specimen: Blood   Result Value Ref Range    25 Hydroxy, Vitamin D 39.7 30.0 - 100.0 ng/ml        Assessment and Plan  Diagnoses and all orders for this visit:    Annual physical exam  - Counseling was given to patient for the following topics:  appropriate exercise, healthy eating habits, importance of immunizations, including risks and benefits, and bone health. Also discussed the importance of regular dental and vision care.  -     CBC (No Diff); Future  -     TSH Rfx On Abnormal To Free T4; Future    Mild intermittent asthma without complication  - following with pulmonary  - has inhalers and nebs PRN    Crohn's disease of colon without complication  - follows with Dr. 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  - on plavix    Osteoporosis, unspecified osteoporosis type, unspecified pathological fracture presence  - follows with Dr. Alva, on prolia and working to increase weight bearing exercise    Chronic GERD  Hiatal hernia  History of Perez's esophagus  Esophageal dysphagia  - experiencing dysphagia  - has not had EGD in 5+ years but reports last showed resolution of barretts esophagus  - she continues nexium 40mg BID and pepcid 40mg BID  - referral for EGD    Mixed hyperlipidemia  - has been controlled on crestor 10mg daily  - lipid panel ordered    Mitral valve prolapse  - asymptomatic on nebivolol 5mg daily     Prediabetes  - update A1c    Enteropathic arthritis  - following with Dr. Alva    History of compression fracture of spine  - Hx of T7 compression fx in 2017 related to osteoporosis  - has intermittent pain in thoracic spine and treats with robaxin or tizandine PRN on rare occasion. Avoids  NSAIDs due to crohns.    Elevated ALT measurement  - mild with no other signs of liver dysfunction  - CBC today. She defers GREY fibrosure.  - likely fatty liver and she will work on weight loss       Health Maintenance   Topic Date Due   • COLORECTAL CANCER SCREENING  Never done   • ANNUAL PHYSICAL  08/18/2024   • COVID-19 Vaccine (4 - 2024-25 season) 11/17/2024 (Originally 9/1/2024)   • LIPID PANEL  02/23/2025   • BMI FOLLOWUP  05/31/2025   • MAMMOGRAM  07/12/2025   • TDAP/TD VACCINES (4 - Td or Tdap) 02/21/2030   • HEPATITIS C SCREENING  Completed   • Pneumococcal Vaccine 0-64  Completed   • INFLUENZA VACCINE  Completed   • ZOSTER VACCINE  Completed   • PAP SMEAR  Discontinued     Health Maintenance  - Pap smear: s/p hysterectomy  - Mammogram: 7/2024 BIRADS 1  - Colonoscopy: scheduled 12/2024  - HCV: negative  - Immunizations: flu UTD. PCV20 8/2023. COVID declined. Shingrix complete. Tdap 2/2020.   - Depression screening: negative 2/2024    Return in about 6 months (around 5/15/2025) for Annual, Labs today.

## 2024-11-27 ENCOUNTER — OFFICE VISIT (OUTPATIENT)
Dept: INTERNAL MEDICINE | Facility: CLINIC | Age: 64
End: 2024-11-27
Payer: COMMERCIAL

## 2024-11-27 VITALS
HEIGHT: 62 IN | SYSTOLIC BLOOD PRESSURE: 140 MMHG | RESPIRATION RATE: 16 BRPM | TEMPERATURE: 97.4 F | BODY MASS INDEX: 32.42 KG/M2 | OXYGEN SATURATION: 98 % | WEIGHT: 176.2 LBS | DIASTOLIC BLOOD PRESSURE: 88 MMHG | HEART RATE: 80 BPM

## 2024-11-27 DIAGNOSIS — R03.0 ELEVATED BLOOD PRESSURE READING: ICD-10-CM

## 2024-11-27 DIAGNOSIS — M54.6 ACUTE BILATERAL THORACIC BACK PAIN: Primary | ICD-10-CM

## 2024-11-27 DIAGNOSIS — M51.44 SCHMORL'S NODES, THORACIC REGION: ICD-10-CM

## 2024-11-27 RX ORDER — PREDNISONE 10 MG/1
TABLET ORAL
Qty: 30 TABLET | Refills: 0 | Status: SHIPPED | OUTPATIENT
Start: 2024-11-27 | End: 2024-11-27

## 2024-11-27 RX ORDER — PREDNISONE 10 MG/1
TABLET ORAL
Qty: 30 TABLET | Refills: 0 | Status: SHIPPED | OUTPATIENT
Start: 2024-11-27 | End: 2024-12-09

## 2024-11-27 NOTE — PROGRESS NOTES
I have reviewed the notes, assessments, and/or procedures performed by Ashley Hodges PA-C, I concur with her/his documentation of Antonieta Howe.

## 2024-11-27 NOTE — PROGRESS NOTES
Follow Up Office Visit      Date: 2024  Patient Name: Antonieta Howe  : 1960   MRN: [id number]     Chief Complaint:    Chief Complaint   Patient presents with    Back Pain     Upright is fine -not as much pain  Sitting hurts  Sleeping on side is painful - unable to sleep    Spasms       History of Present Illness: Antonieta Howe is a 64 y.o. female who is here today for follow up with back pain that onset 2-weeks and has been gradually increasing.  Patient has a pertinent history of osteoporosis and T7 compression fracture and experiences intermittent back pain due to this. However last night pain increased and she was unable to sleep. Tried muscle relaxer with no significant improvement.  Pain increases with changing positions and with laying flat on her back.  No numbness, weakness or loss of strength.  No bladder or bowel incontinence.      Subjective      Review of Systems:   Review of Systems   Gastrointestinal:  Negative for constipation.   Genitourinary:  Negative for urinary incontinence.   Musculoskeletal:  Positive for back pain.   Neurological:  Negative for weakness and numbness.       Medications:     Current Outpatient Medications:     albuterol (PROVENTIL) 2.5 MG/0.5ML nebulizer solution, Take 2.5 mg by nebulization Every 4 (Four) Hours As Needed for Wheezing., Disp: 20 mL, Rfl: 0    albuterol sulfate  (90 Base) MCG/ACT inhaler, Inhale 2 puffs Every 4 (Four) Hours As Needed for Wheezing., Disp: 18 g, Rfl: 0    azelastine (ASTEPRO) 0.15 % solution nasal spray, 1 spray into the nostril(s) as directed by provider 2 (Two) Times a Day., Disp: 30 mL, Rfl: 10    betamethasone, augmented, (DIPROLENE) 0.05 % cream, Apply topically to the appropriate area as directed 2 (Two) Times a Day for 14 days, then decrease to 2-3 times weekly, Disp: 50 g, Rfl: 1    Calcium Polycarbophil (FIBER-CAPS PO), Take  by mouth Daily., Disp: , Rfl:     clopidogrel (PLAVIX) 75 MG tablet, Take 1  tablet by mouth Daily., Disp: 90 tablet, Rfl: 3    Coenzyme Q10 (COQ10) 200 MG capsule, Take 1 capsule by mouth Take As Directed., Disp: , Rfl:     denosumab (Prolia) 60 MG/ML solution prefilled syringe syringe, Inject 1 mL under the skin into the appropriate area as directed Every 6 (Six) Months., Disp: 1 mL, Rfl: 0    Dietary Management Product (Vasculera) tablet, Vasculera, Disp: , Rfl:     esomeprazole (nexIUM) 40 MG capsule, Take 1 capsule by mouth 2 (Two) Times a Day., Disp: 180 capsule, Rfl: 3    famotidine (PEPCID) 40 MG tablet, Take 1 tablet by mouth 2 (Two) Times a Day., Disp: 180 tablet, Rfl: 3    ipratropium-albuterol (Combivent Respimat)  MCG/ACT inhaler, Inhale 1 puff 4 (Four) Times a Day., Disp: 12 g, Rfl: 1    ketotifen (Zaditor) 0.025 % ophthalmic solution, Apply 1 drop to eye(s) as directed by provider 2 (Two) Times a Day., Disp: 5 mL, Rfl: 5    mesalamine (DELZICOL) 400 MG capsule delayed-release delayed release capsule, Take 2 capsules by mouth 2 (Two) Times a Day., Disp: 360 capsule, Rfl: 5    methocarbamol (ROBAXIN) 500 MG tablet, Take 1 tablet by mouth Every 6-8  Hours As Needed., Disp: 30 tablet, Rfl: 1    nebivolol (Bystolic) 5 MG tablet, Take 1 tablet by mouth Daily., Disp: 90 tablet, Rfl: 3    NIACIN PO, Take  by mouth., Disp: , Rfl:     predniSONE (DELTASONE) 10 MG tablet, 4 PO QD for 3 days, then 3 PO QD for 3 days, then 2 PO QD for 3 days, then 1 PO QD for 3 days, then stop, Disp: 30 tablet, Rfl: 0    Probiotic Product (PROBIOTIC ADVANCED PO), Take  by mouth Daily., Disp: , Rfl:     rosuvastatin (Crestor) 10 MG tablet, Take 1 tablet by mouth Daily., Disp: 90 tablet, Rfl: 3    tiZANidine (ZANAFLEX) 2 MG tablet, take 0.5-1 tablet in the am and afternoon and 1-2 at bedtime, Disp: 90 tablet, Rfl: 3    vitamin D3 125 MCG (5000 UT) capsule capsule, Take  by mouth., Disp: , Rfl:     Allergies:   Allergies   Allergen Reactions    Erythromycin GI Intolerance    Levaquin [Levofloxacin]  "Other (See Comments)     Patient states she gets really bad plantar fasciitis    Vancomycin Other (See Comments)     Red man syndrome    Bactrim [Sulfamethoxazole-Trimethoprim] Itching and Rash    Beeswax Rash    Other Itching and Rash     Kapidex    Zosyn [Piperacillin Sod-Tazobactam So] Rash     Possibly due to vancomycin and not zosyn, administered concurrently.       Objective     Physical Exam:  Vital Signs:   Vitals:    11/27/24 1013   BP: 140/88   Pulse: 80   Resp: 16   Temp: 97.4 °F (36.3 °C)   TempSrc: Temporal   SpO2: 98%   Weight: 79.9 kg (176 lb 3.2 oz)   Height: 157.5 cm (62.01\")   PainSc: 7  Comment: Lying down   PainLoc: Back     Body mass index is 32.22 kg/m².   Facility age limit for growth %janice is 20 years.       Physical Exam  Vitals and nursing note reviewed.   Constitutional:       General: She is not in acute distress.     Appearance: Normal appearance.   Eyes:      Extraocular Movements: Extraocular movements intact.      Conjunctiva/sclera: Conjunctivae normal.   Cardiovascular:      Pulses: Normal pulses.   Pulmonary:      Effort: Pulmonary effort is normal. No respiratory distress.   Musculoskeletal:         General: No swelling.      Thoracic back: Tenderness (Mild diffusely) present. No swelling, edema, deformity or spasms.   Neurological:      General: No focal deficit present.      Mental Status: She is alert and oriented to person, place, and time. Mental status is at baseline.      Sensory: No sensory deficit.      Motor: No weakness.      Gait: Gait normal.   Psychiatric:         Mood and Affect: Mood normal.         Behavior: Behavior normal.         Assessment / Plan      Assessment/Plan:   Diagnoses and all orders for this visit:    1. Acute bilateral thoracic back pain (Primary)  -     MRI Thoracic Spine Without Contrast; Future  -     Discontinue: predniSONE (DELTASONE) 10 MG tablet; 4 PO QD for 3 days, then 3 PO QD for 3 days, then 2 PO QD for 3 days, then 1 PO QD for 3 days, " then stop  Dispense: 30 tablet; Refill: 0  -     predniSONE (DELTASONE) 10 MG tablet; 4 PO QD for 3 days, then 3 PO QD for 3 days, then 2 PO QD for 3 days, then 1 PO QD for 3 days, then stop  Dispense: 30 tablet; Refill: 0    2. Schmorl's nodes, thoracic region  -     MRI Thoracic Spine Without Contrast; Future    3. Elevated blood pressure reading       PLAN:  - Last MRI 2017, increasing pain- need to update imaging  - Patient reports she has 2 different kinds of muscle relaxers at home that she will take as needed for pain  - Will also trial prednisone taper, also discussed OTC lidocaine patches  - BP elevated, likely situational, recommend monitoring at home and return to the office if readings are consistently greater than 130/80  - Return to the office if symptoms worsen or fail to improve with current plan       Follow Up:   Return for Next scheduled follow up, Follow up with Dr. Maurer.      [facility] [place]

## 2024-12-22 ENCOUNTER — HOSPITAL ENCOUNTER (OUTPATIENT)
Facility: HOSPITAL | Age: 64
Discharge: HOME | End: 2024-12-22
Payer: COMMERCIAL

## 2024-12-22 DIAGNOSIS — M54.6 ACUTE BILATERAL THORACIC BACK PAIN: ICD-10-CM

## 2024-12-22 DIAGNOSIS — M51.44 SCHMORL'S NODES, THORACIC REGION: ICD-10-CM

## 2024-12-22 PROCEDURE — 72146 MRI CHEST SPINE W/O DYE: CPT

## 2025-02-12 ENCOUNTER — PATIENT ROUNDING (BHMG ONLY) (OUTPATIENT)
Dept: URGENT CARE | Facility: CLINIC | Age: 65
End: 2025-02-12
Payer: COMMERCIAL

## 2025-02-12 NOTE — ED NOTES
Thank you for letting us care for you in your recent visit to our urgent care center. We would love to hear about your experience with us. Was this the first time you have visited our location?    We’re always looking for ways to make our patients’ experiences even better. Do you have any recommendations on ways we may improve?     I appreciate you taking the time to respond. Please be on the lookout for a survey about your recent visit from [employer] via text or email. We would greatly appreciate if you could fill that out and turn it back in. We want your voice to be heard and we value your feedback.   Thank you for choosing [facility] for your healthcare needs.

## 2025-02-19 ENCOUNTER — PATIENT MESSAGE (OUTPATIENT)
Dept: INTERNAL MEDICINE | Facility: CLINIC | Age: 65
End: 2025-02-19
Payer: COMMERCIAL

## 2025-02-19 DIAGNOSIS — B00.1 RECURRENT COLD SORES: Primary | ICD-10-CM

## 2025-02-19 RX ORDER — VALACYCLOVIR HYDROCHLORIDE 1 G/1
2000 TABLET, FILM COATED ORAL 2 TIMES DAILY
Qty: 28 TABLET | Refills: 0 | Status: SHIPPED | OUTPATIENT
Start: 2025-02-19 | End: 2025-02-20

## 2025-02-28 ENCOUNTER — TELEPHONE (OUTPATIENT)
Age: 65
End: 2025-02-28
Payer: COMMERCIAL

## 2025-02-28 NOTE — TELEPHONE ENCOUNTER
PT APPT HAD TO BE CANCELLED. IF PT CALLS BACK TO RESCHEDULE, PLEASE SCHEDULE WITH MIKKI EUCEDA OR CEDRIC. IF NOT ALREADY ON, PLEASE ADD PT TO THE CANCELLATION LIST AS NORMAL PRIOTIRY WITH AN END DATE OF 12/31/2025.   -HUB READY TO SCHEDULE.

## 2025-03-05 ENCOUNTER — SPECIALTY PHARMACY (OUTPATIENT)
Age: 65
End: 2025-03-05
Payer: COMMERCIAL

## 2025-03-05 ENCOUNTER — TELEPHONE (OUTPATIENT)
Age: 65
End: 2025-03-05
Payer: COMMERCIAL

## 2025-03-05 NOTE — TELEPHONE ENCOUNTER
Patient due for Prolia injection at the end of the month, prior injection on 24. Let us know when she is scheduled.    Patients copay is currently $250--she will need to reapply for a copay card, her old copay card has .

## 2025-03-05 NOTE — TELEPHONE ENCOUNTER
Spoke with patient and let her know about prolia copay card. Patient verbalized understanding. She will call back once she is approved to let us know so we can order medication.

## 2025-03-05 NOTE — PROGRESS NOTES
Specialty Pharmacy Patient Management Program  Rheumatology Reassessment     Antonieta Howe was referred by an Rheumatology provider to the Rheumatology Patient Management program offered by [facility] for Osteoporosis. A follow-up outreach was conducted, including assessment of continued therapy appropriateness, medication adherence, and side effect incidence and management for Prolia.    Changes to Insurance Coverage or Financial Support  Affirmed RX and copay card    Relevant Past Medical History and Comorbidities  Relevant medical history and concomitant health conditions were discussed with the patient. The patient's chart has been reviewed for relevant past medical history and comorbid health conditions and updated as necessary.   Past Medical History:   Diagnosis Date    Achilles tendonitis     Arthritis     Asthma     Crohn disease     Diverticulitis 2019    Diverticulitis     Esophagitis     Gastrointestinal disorder     GERD (gastroesophageal reflux disease)     Headache     Hepatitis     Hepatitis A     High cholesterol     Hyperlipidemia     Osteoarthritis     Osteoporosis     Plantar fasciitis     Skin disorder     Splenic infarct      Social History     Socioeconomic History    Marital status:    Tobacco Use    Smoking status: Former     Current packs/day: 0.00     Average packs/day: 0.5 packs/day for 10.0 years (5.0 ttl pk-yrs)     Types: Cigarettes     Start date:      Quit date:      Years since quittin.1     Passive exposure: Past    Smokeless tobacco: Never   Vaping Use    Vaping status: Never Used   Substance and Sexual Activity    Alcohol use: No     Comment: never a heavy drinker    Drug use: No    Sexual activity: Defer     Problem list reviewed by Yessenia Mckeon, PharmD on 3/5/2025 at 11:06 AM    Hospitalizations and Urgent Care Since Last Assessment  ED Visits, Admissions, or Hospitalizations: none  Urgent Office Visits:  none    Allergies  Known allergies and reactions were discussed with the patient. The patient's chart has been reviewed for allergy information and updated as necessary.   Allergies   Allergen Reactions    Erythromycin GI Intolerance    Levaquin [Levofloxacin] Other (See Comments)     Patient states she gets really bad plantar fasciitis    Vancomycin Other (See Comments)     Red man syndrome    Bactrim [Sulfamethoxazole-Trimethoprim] Itching and Rash    Beeswax Rash    Other Itching and Rash     Kapidex    Zosyn [Piperacillin Sod-Tazobactam So] Rash     Possibly due to vancomycin and not zosyn, administered concurrently.     Allergies reviewed by Yessenia Mckeon, PharmD on 3/5/2025 at 11:06 AM    Relevant Laboratory Values  Relevant laboratory values were discussed with the patient. The following specialty medication dose adjustment(s) are recommended:   A1C Last 3 Results          11/15/2024    12:21   HGBA1C Last 3 Results   Hemoglobin A1C 5.90      Lab Results   Component Value Date    HGBA1C 5.90 (H) 11/15/2024     Lab Results   Component Value Date    GLUCOSE 83 11/15/2024    CALCIUM 9.4 11/15/2024     11/15/2024    K 4.0 11/15/2024    CO2 26.0 11/15/2024     11/15/2024    BUN 12 11/15/2024    CREATININE 0.65 11/15/2024    EGFRIFNONA 95 11/22/2021    BCR 18.5 11/15/2024    ANIONGAP 10.0 11/15/2024     Lab Results   Component Value Date    CHOL 168 11/15/2024    CHLPL 174 10/08/2022    TRIG 110 11/15/2024    HDL 62 (H) 11/15/2024    LDL 86 11/15/2024       Current Medication List  This medication list has been reviewed with the patient and evaluated for any interactions or necessary modifications/recommendations, and updated to include all prescription medications, OTC medications, and supplements the patient is currently taking.  This list reflects what is contained in the patient's profile, which has also been marked as reviewed to communicate to other providers it is the most up to date version of  the patient's current medication therapy.     Current Outpatient Medications:     albuterol (PROVENTIL) 2.5 MG/0.5ML nebulizer solution, Take 2.5 mg by nebulization Every 4 (Four) Hours As Needed for Wheezing., Disp: 20 mL, Rfl: 0    albuterol sulfate  (90 Base) MCG/ACT inhaler, Inhale 2 puffs Every 4 (Four) Hours As Needed for Wheezing., Disp: 18 g, Rfl: 0    azelastine (ASTEPRO) 0.15 % solution nasal spray, 1 spray into the nostril(s) as directed by provider 2 (Two) Times a Day., Disp: 30 mL, Rfl: 10    betamethasone, augmented, (DIPROLENE) 0.05 % cream, Apply topically to the appropriate area as directed 2 (Two) Times a Day for 14 days, then decrease to 2-3 times weekly, Disp: 50 g, Rfl: 1    Calcium Polycarbophil (FIBER-CAPS PO), Take  by mouth Daily., Disp: , Rfl:     clopidogrel (PLAVIX) 75 MG tablet, Take 1 tablet by mouth Daily., Disp: 90 tablet, Rfl: 3    Coenzyme Q10 (COQ10) 200 MG capsule, Take 1 capsule by mouth Take As Directed., Disp: , Rfl:     denosumab (Prolia) 60 MG/ML solution prefilled syringe syringe, Inject 1 mL under the skin into the appropriate area as directed Every 6 (Six) Months., Disp: 1 mL, Rfl: 0    Dietary Management Product (Vasculera) tablet, Vasculera, Disp: , Rfl:     doxycycline (MONODOX) 100 MG capsule, Take 1 capsule by mouth 2 (Two) Times a Day., Disp: 20 capsule, Rfl: 0    esomeprazole (nexIUM) 40 MG capsule, Take 1 capsule by mouth 2 (Two) Times a Day., Disp: 180 capsule, Rfl: 3    famotidine (PEPCID) 40 MG tablet, Take 1 tablet by mouth 2 (Two) Times a Day., Disp: 180 tablet, Rfl: 3    ipratropium (ATROVENT) 0.06 % nasal spray, Administer 2 sprays into the nostril(s) as directed by provider 4 (Four) Times a Day., Disp: 15 mL, Rfl: 0    ipratropium-albuterol (Combivent Respimat)  MCG/ACT inhaler, Inhale 1 puff 4 (Four) Times a Day., Disp: 12 g, Rfl: 1    ketotifen (Zaditor) 0.025 % ophthalmic solution, Apply 1 drop to eye(s) as directed by provider 2 (Two) Times  a Day., Disp: 5 mL, Rfl: 5    mesalamine (DELZICOL) 400 MG capsule delayed-release delayed release capsule, Take 2 capsules by mouth 2 (Two) Times a Day., Disp: 360 capsule, Rfl: 5    methocarbamol (ROBAXIN) 500 MG tablet, Take 1 tablet by mouth Every 6-8  Hours As Needed., Disp: 30 tablet, Rfl: 1    methylPREDNISolone (MEDROL) 4 MG dose pack, Take as directed on package instructions., Disp: 21 tablet, Rfl: 0    nebivolol (Bystolic) 5 MG tablet, Take 1 tablet by mouth Daily., Disp: 90 tablet, Rfl: 3    NIACIN PO, Take  by mouth., Disp: , Rfl:     Probiotic Product (PROBIOTIC ADVANCED PO), Take  by mouth Daily., Disp: , Rfl:     rosuvastatin (Crestor) 10 MG tablet, Take 1 tablet by mouth Daily., Disp: 90 tablet, Rfl: 3    tiZANidine (ZANAFLEX) 2 MG tablet, Take 0.5-1 tablet by mouth in the morning and afternoon and 1-2 tablets at bedtime, Disp: 90 tablet, Rfl: 3    vitamin D3 125 MCG (5000 UT) capsule capsule, Take  by mouth., Disp: , Rfl:     Medicines reviewed by Yessenia Mckeon, PharmD on 3/5/2025 at 11:06 AM    Drug Interactions  No medication interactions    Adverse Drug Reactions  Medication tolerability: Tolerating with no to minimal ADRs  Medication plan: Continue therapy with normal follow-up  Plan for ADR Management: no ADRs    Adherence, Self-Administration, and Current Therapy Problems  Adherence related to the patient's specialty therapy was discussed with the patient. The Adherence segment of this outreach has been reviewed and updated.     Adherence Questions  Linked Medication(s) Assessed: Denosumab (Prolia)  On average, how many doses/injections does the patient miss per month?: 0  What are the identified reasons for non-adherence or missed doses? : no problems identified  What is the estimated medication adherence level?: %  Based on the patient/caregiver response and refill history, does this patient require an MTP to track adherence improvements?: no    Additional Barriers to Patient  Self-Administration: no  Methods for Supporting Patient Self-Administration: no    Open Medication Therapy Problems  No medication therapy recommendations to display    Goals of Therapy  Goals related to the patient's specialty therapy were discussed with the patient. The Patient Goals segment of this outreach has been reviewed and updated.   Goals Addressed Today        Specialty Pharmacy General Goal      Improve DEXA scan    3.5.25: Last DEXA on 11/1/2019 T score= -3.6                Quality of Life Assessment   Quality of Life related to the patient's enrollment in the patient management program and services provided was discussed with the patient. The QOL segment of this outreach has been reviewed and updated.  Quality of Life Improvement Scale: 5-No change    Reassessment Plan & Follow-Up  1. Medication Therapy Changes: Prolia 60mg every 6 months  2. Related Plans, Therapy Recommendations, or Issues to Be Addressed: No questions or concerns at this time  3. Pharmacist to perform regular assessments no more than (6) months from the previous assessment.  4. Care Coordinator to set up future refill outreaches, coordinate prescription delivery, and escalate clinical questions to pharmacist.    Attestation  Therapeutic appropriateness: Appropriate   I attest the patient was actively involved in and has agreed to the above plan of care.  If the prescribed therapy is at any point deemed not appropriate based on the current or future assessments, a consultation will be initiated with the patient's specialty care provider to determine the best course of action. The revised plan of therapy will be documented along with any required assessments and/or additional patient education provided.     Yessenia Mckeon, PharmD, BCPS  Clinical Specialty Pharmacist, Rheumatology   3/5/2025  11:12 EST

## 2025-03-19 ENCOUNTER — SPECIALTY PHARMACY (OUTPATIENT)
Facility: HOSPITAL | Age: 65
End: 2025-03-19
Payer: COMMERCIAL

## 2025-03-19 ENCOUNTER — SPECIALTY PHARMACY (OUTPATIENT)
Age: 65
End: 2025-03-19
Payer: COMMERCIAL

## 2025-03-19 DIAGNOSIS — Z79.899 HIGH RISK MEDICATION USE: ICD-10-CM

## 2025-03-19 DIAGNOSIS — M81.0 OSTEOPOROSIS, UNSPECIFIED OSTEOPOROSIS TYPE, UNSPECIFIED PATHOLOGICAL FRACTURE PRESENCE: Primary | ICD-10-CM

## 2025-03-19 DIAGNOSIS — E55.9 VITAMIN D DEFICIENCY: ICD-10-CM

## 2025-03-19 RX ORDER — DENOSUMAB 60 MG/ML
60 INJECTION SUBCUTANEOUS
Qty: 1 ML | Refills: 0 | Status: SHIPPED | OUTPATIENT
Start: 2025-03-19

## 2025-03-19 NOTE — PROGRESS NOTES
Specialty Pharmacy Patient Management Program  Per Protocol Prescription Order/Refill     Patient currently fills medications at [facility] and is enrolled in an Rheumatology Patient Management Program.     Requested Prescriptions     Signed Prescriptions Disp Refills    denosumab (Prolia) 60 MG/ML solution prefilled syringe syringe 1 mL 0     Sig: Inject 1 mL under the skin into the appropriate area as directed Every 6 (Six) Months.     Prescription orders above were sent to the pharmacy per Collaborative Care Agreement Protocol.

## 2025-03-19 NOTE — PROGRESS NOTES
Specialty Pharmacy Patient Management Program  Refill Outreach     Antonieta was contacted today regarding refills of their medication(s).    Refill Questions      Flowsheet Row Most Recent Value   Changes to allergies? No   Changes to medications? No   New conditions or infections since last clinic visit No   Unplanned office visit, urgent care, ED, or hospital admission in the last 4 weeks  No   How does patient/caregiver feel medication is working? Good   Financial problems or insurance changes  No   Since the previous refill, were any specialty medication doses or scheduled injections missed or delayed?  No   Does this patient require a clinical escalation to a pharmacist? No            Delivery Questions      Flowsheet Row Most Recent Value   Delivery method  at Pharmacy   Copay verified? Yes   Copay amount $25.00   Copay form of payment Credit/debit on file   Signature Required No                 Follow-up: 173 day(s)     Ching Gallegos, Pharmacy Technician  3/19/2025  14:35 EDT

## 2025-03-27 ENCOUNTER — LAB (OUTPATIENT)
Dept: LAB | Facility: HOSPITAL | Age: 65
End: 2025-03-27
Payer: COMMERCIAL

## 2025-03-27 DIAGNOSIS — E55.9 VITAMIN D DEFICIENCY: ICD-10-CM

## 2025-03-27 DIAGNOSIS — M81.0 OSTEOPOROSIS, UNSPECIFIED OSTEOPOROSIS TYPE, UNSPECIFIED PATHOLOGICAL FRACTURE PRESENCE: ICD-10-CM

## 2025-03-27 DIAGNOSIS — Z79.899 HIGH RISK MEDICATION USE: ICD-10-CM

## 2025-03-27 LAB
BASOPHILS # BLD AUTO: 0.03 10*3/MM3 (ref 0–0.2)
BASOPHILS NFR BLD AUTO: 0.5 % (ref 0–1.5)
DEPRECATED RDW RBC AUTO: 50 FL (ref 37–54)
EOSINOPHIL # BLD AUTO: 0.17 10*3/MM3 (ref 0–0.4)
EOSINOPHIL NFR BLD AUTO: 2.6 % (ref 0.3–6.2)
ERYTHROCYTE [DISTWIDTH] IN BLOOD BY AUTOMATED COUNT: 16 % (ref 12.3–15.4)
HCT VFR BLD AUTO: 39.1 % (ref 34–46.6)
HGB BLD-MCNC: 12.1 G/DL (ref 12–15.9)
IMM GRANULOCYTES # BLD AUTO: 0.01 10*3/MM3 (ref 0–0.05)
IMM GRANULOCYTES NFR BLD AUTO: 0.2 % (ref 0–0.5)
LYMPHOCYTES # BLD AUTO: 3.28 10*3/MM3 (ref 0.7–3.1)
LYMPHOCYTES NFR BLD AUTO: 50.4 % (ref 19.6–45.3)
MCH RBC QN AUTO: 26.5 PG (ref 26.6–33)
MCHC RBC AUTO-ENTMCNC: 30.9 G/DL (ref 31.5–35.7)
MCV RBC AUTO: 85.7 FL (ref 79–97)
MONOCYTES # BLD AUTO: 0.74 10*3/MM3 (ref 0.1–0.9)
MONOCYTES NFR BLD AUTO: 11.4 % (ref 5–12)
NEUTROPHILS NFR BLD AUTO: 2.28 10*3/MM3 (ref 1.7–7)
NEUTROPHILS NFR BLD AUTO: 34.9 % (ref 42.7–76)
NRBC BLD AUTO-RTO: 0 /100 WBC (ref 0–0.2)
PLATELET # BLD AUTO: 390 10*3/MM3 (ref 140–450)
PMV BLD AUTO: 8.2 FL (ref 6–12)
RBC # BLD AUTO: 4.56 10*6/MM3 (ref 3.77–5.28)
WBC NRBC COR # BLD AUTO: 6.51 10*3/MM3 (ref 3.4–10.8)

## 2025-03-27 PROCEDURE — 85025 COMPLETE CBC W/AUTO DIFF WBC: CPT

## 2025-03-27 PROCEDURE — 80053 COMPREHEN METABOLIC PANEL: CPT

## 2025-03-27 PROCEDURE — 82306 VITAMIN D 25 HYDROXY: CPT

## 2025-03-27 PROCEDURE — 36415 COLL VENOUS BLD VENIPUNCTURE: CPT

## 2025-03-28 ENCOUNTER — CLINICAL SUPPORT (OUTPATIENT)
Age: 65
End: 2025-03-28
Payer: COMMERCIAL

## 2025-03-28 VITALS
BODY MASS INDEX: 32.68 KG/M2 | WEIGHT: 177.6 LBS | TEMPERATURE: 97.9 F | SYSTOLIC BLOOD PRESSURE: 132 MMHG | DIASTOLIC BLOOD PRESSURE: 88 MMHG | HEIGHT: 62 IN | HEART RATE: 86 BPM

## 2025-03-28 DIAGNOSIS — M81.0 OSTEOPOROSIS, UNSPECIFIED OSTEOPOROSIS TYPE, UNSPECIFIED PATHOLOGICAL FRACTURE PRESENCE: Primary | ICD-10-CM

## 2025-03-28 LAB
25(OH)D3 SERPL-MCNC: 54.2 NG/ML (ref 30–100)
ALBUMIN SERPL-MCNC: 4.3 G/DL (ref 3.5–5.2)
ALBUMIN/GLOB SERPL: 1.7 G/DL
ALP SERPL-CCNC: 101 U/L (ref 39–117)
ALT SERPL W P-5'-P-CCNC: 21 U/L (ref 1–33)
ANION GAP SERPL CALCULATED.3IONS-SCNC: 10 MMOL/L (ref 5–15)
AST SERPL-CCNC: 21 U/L (ref 1–32)
BILIRUB SERPL-MCNC: 0.4 MG/DL (ref 0–1.2)
BUN SERPL-MCNC: 13 MG/DL (ref 8–23)
BUN/CREAT SERPL: 20 (ref 7–25)
CALCIUM SPEC-SCNC: 9.4 MG/DL (ref 8.6–10.5)
CHLORIDE SERPL-SCNC: 104 MMOL/L (ref 98–107)
CO2 SERPL-SCNC: 26 MMOL/L (ref 22–29)
CREAT SERPL-MCNC: 0.65 MG/DL (ref 0.57–1)
EGFRCR SERPLBLD CKD-EPI 2021: 99.7 ML/MIN/1.73
GLOBULIN UR ELPH-MCNC: 2.6 GM/DL
GLUCOSE SERPL-MCNC: 103 MG/DL (ref 65–99)
POTASSIUM SERPL-SCNC: 3.8 MMOL/L (ref 3.5–5.2)
PROT SERPL-MCNC: 6.9 G/DL (ref 6–8.5)
SODIUM SERPL-SCNC: 140 MMOL/L (ref 136–145)

## 2025-03-28 NOTE — PROGRESS NOTES
Patient in today for Prolia 60mg/1mL. This was administered in the left upper arm. NVB=[id number], EXP=6/2027, NDC=55513-710-21. This was supplied via [facility] specialty pharmacy and copay was collected by them prior to injection. Patient tolerated injection well and vital signs were stable. Patient's last Prolia was 9/27/24. Patient had labs drawn yesterday, 3/27/25. Her Creatinine was 0.65, Calcium 9.4, and Vitamin D was 54.2. Patient denies any recent or upcoming dental work. Patient has her next follow up and Prolia injection scheduled. Patient left [facility] alert and oriented free of all s/s of a reaction and complaints.

## 2025-05-23 ENCOUNTER — OFFICE VISIT (OUTPATIENT)
Dept: INTERNAL MEDICINE | Facility: CLINIC | Age: 65
End: 2025-05-23
Payer: COMMERCIAL

## 2025-05-23 ENCOUNTER — LAB (OUTPATIENT)
Dept: LAB | Facility: HOSPITAL | Age: 65
End: 2025-05-23
Payer: COMMERCIAL

## 2025-05-23 VITALS
BODY MASS INDEX: 32.02 KG/M2 | HEART RATE: 68 BPM | OXYGEN SATURATION: 96 % | HEIGHT: 62 IN | DIASTOLIC BLOOD PRESSURE: 78 MMHG | SYSTOLIC BLOOD PRESSURE: 116 MMHG | WEIGHT: 174 LBS | TEMPERATURE: 97.9 F

## 2025-05-23 DIAGNOSIS — K44.9 HIATAL HERNIA: ICD-10-CM

## 2025-05-23 DIAGNOSIS — L30.9 DERMATITIS: ICD-10-CM

## 2025-05-23 DIAGNOSIS — Z87.81 HISTORY OF COMPRESSION FRACTURE OF SPINE: ICD-10-CM

## 2025-05-23 DIAGNOSIS — R73.03 PREDIABETES: ICD-10-CM

## 2025-05-23 DIAGNOSIS — K21.9 CHRONIC GERD: Primary | ICD-10-CM

## 2025-05-23 DIAGNOSIS — Z87.19 HISTORY OF BARRETT'S ESOPHAGUS: ICD-10-CM

## 2025-05-23 LAB — HBA1C MFR BLD: 6 % (ref 4.8–5.6)

## 2025-05-23 PROCEDURE — 99214 OFFICE O/P EST MOD 30 MIN: CPT | Performed by: INTERNAL MEDICINE

## 2025-05-23 PROCEDURE — 83036 HEMOGLOBIN GLYCOSYLATED A1C: CPT

## 2025-05-23 NOTE — PROGRESS NOTES
Internal Medicine Follow Up    Chief Complaint  Antonieta Howe is a 64 y.o. female who presents today for follow up of chronic medical conditions outlined below.    Chief Complaint   Patient presents with    Back Pain    Heartburn        HPI  Ms. Howe comes in today for follow up. Had EGD and colonoscopy. EGD showed only hiatal hernia and colonoscopy showed mild changes from crohns. Will need repeat colonoscopy in 5 years. She reports ongoing intermittent issues with GERD. No clear trigger. She is elevating HOB, avoiding laying down after eating, avoiding large meals and food triggers with exception of caffeine. She is on nexium daily and pepcid BID. She notes concern that some of her medications may lead to DM but understands also that each medication is indicated and may not be able to be discontinued. She was seen earlier this year for increased back pain. She is managing with tizanidine PRN. She is scheduled today for cataract extraction. She notes a rash in both axilla. Does not itch. Has been present before and resolved on its own after a couple weeks. No new products.      Symptoms include: rash.        Review of Systems  Review of Systems   Constitutional: Negative.    HENT:  Positive for trouble swallowing.    Gastrointestinal:  Positive for GERD.   Musculoskeletal:  Positive for back pain.   Skin:  Positive for rash.        Current Medications  Current Outpatient Medications on File Prior to Visit   Medication Sig Dispense Refill    albuterol (PROVENTIL) 2.5 MG/0.5ML nebulizer solution Take 2.5 mg by nebulization Every 4 (Four) Hours As Needed for Wheezing. 20 mL 0    albuterol sulfate  (90 Base) MCG/ACT inhaler Inhale 2 puffs Every 4 (Four) Hours As Needed for Wheezing. 18 g 0    azelastine (ASTEPRO) 0.15 % solution nasal spray 1 spray into the nostril(s) as directed by provider 2 (Two) Times a Day. 30 mL 10    betamethasone, augmented, (DIPROLENE) 0.05 % cream Apply topically to the  appropriate area as directed 2 (Two) Times a Day for 14 days, then decrease to 2-3 times weekly 50 g 1    Calcium Polycarbophil (FIBER-CAPS PO) Take  by mouth Daily.      clopidogrel (PLAVIX) 75 MG tablet Take 1 tablet by mouth Daily. 90 tablet 3    Coenzyme Q10 (COQ10) 200 MG capsule Take 1 capsule by mouth Take As Directed.      denosumab (Prolia) 60 MG/ML solution prefilled syringe syringe Inject 1 mL under the skin into the appropriate area as directed Every 6 (Six) Months. 1 mL 0    Dietary Management Product (Vasculera) tablet Vasculera      esomeprazole (nexIUM) 40 MG capsule Take 1 capsule by mouth 2 (Two) Times a Day. 180 capsule 3    famotidine (PEPCID) 40 MG tablet Take 1 tablet by mouth 2 (Two) Times a Day. 180 tablet 3    gentamicin (GARAMYCIN) 0.3 % ophthalmic solution *IMMEDIATELY AFTER SURGERY: Administer 1 drop to operative eye(s) as directed by provider 4 (Four) Times a Day for 7 days; then stop. 5 mL 1    ipratropium-albuterol (Combivent Respimat)  MCG/ACT inhaler Inhale 1 puff 4 (Four) Times a Day. 12 g 1    ketorolac (ACULAR) 0.4 % solution *BEGINNING 3 DAYS PRIOR TO SURGERY: Administer 1 drop to operative eye(s) as directed 4 times a day for 10 days, THEN decrease to 1 drop 2 times a day for 14 days. 5 mL 1    ketotifen (Zaditor) 0.025 % ophthalmic solution Apply 1 drop to eye(s) as directed by provider 2 (Two) Times a Day. 5 mL 5    mesalamine (DELZICOL) 400 MG capsule delayed-release delayed release capsule Take 2 capsules by mouth 2 (Two) Times a Day. 360 capsule 5    nebivolol (Bystolic) 5 MG tablet Take 1 tablet by mouth Daily. 90 tablet 3    NIACIN PO Take  by mouth.      prednisoLONE acetate (PRED FORTE) 1 % ophthalmic suspension *IMMEDIATELY AFTER SURGERY: Administer 1 drop to operative eye(s) as directed 4 times a day for 7 days, THEN decrease to 1 drop 2 times a day for 14 days. 5 mL 1    Probiotic Product (PROBIOTIC ADVANCED PO) Take  by mouth Daily.      rosuvastatin (Crestor)  "10 MG tablet Take 1 tablet by mouth Daily. 90 tablet 3    tiZANidine (ZANAFLEX) 2 MG tablet Take 0.5-1 tablet by mouth in the morning and afternoon and 1-2 tablets at bedtime 90 tablet 3    vitamin D3 125 MCG (5000 UT) capsule capsule Take  by mouth.      [DISCONTINUED] montelukast (Singulair) 10 MG tablet Take 1 tablet by mouth Every Morning. (Patient taking differently: Take 10 mg by mouth Daily As Needed.) 30 tablet 1     No current facility-administered medications on file prior to visit.       Allergies  Allergies   Allergen Reactions    Erythromycin GI Intolerance    Levaquin [Levofloxacin] Other (See Comments)     Patient states she gets really bad plantar fasciitis    Vancomycin Other (See Comments)     Red man syndrome    Bactrim [Sulfamethoxazole-Trimethoprim] Itching and Rash    Beeswax Rash    Other Itching and Rash     Kapidex    Zosyn [Piperacillin Sod-Tazobactam So] Rash     Possibly due to vancomycin and not zosyn, administered concurrently.       Objective  Visit Vitals  /78 (BP Location: Left arm, Patient Position: Sitting)   Pulse 68   Temp 97.9 °F (36.6 °C) (Temporal)   Ht 157.5 cm (62\")   Wt 78.9 kg (174 lb)   LMP  (LMP Unknown)   SpO2 96%   BMI 31.83 kg/m²        Physical Exam  Physical Exam  Vitals and nursing note reviewed.   Constitutional:       General: She is not in acute distress.     Appearance: Normal appearance. She is well-developed. She is obese. She is not ill-appearing or toxic-appearing.   HENT:      Head: Normocephalic and atraumatic.   Eyes:      Conjunctiva/sclera: Conjunctivae normal.   Cardiovascular:      Rate and Rhythm: Normal rate and regular rhythm.      Heart sounds: Normal heart sounds. No murmur heard.  Pulmonary:      Effort: Pulmonary effort is normal. No respiratory distress.      Breath sounds: Normal breath sounds.   Musculoskeletal:      Right lower leg: No edema.      Left lower leg: No edema.   Skin:     General: Skin is warm and dry.      Findings: " Rash (erythematous rash around border of axilla) present.   Neurological:      Mental Status: She is alert and oriented to person, place, and time. Mental status is at baseline.      Gait: Gait normal.   Psychiatric:         Mood and Affect: Mood normal.         Behavior: Behavior normal.         Thought Content: Thought content normal.         Judgment: Judgment normal.         Results  Results for orders placed or performed in visit on 03/27/25   Comprehensive Metabolic Panel    Collection Time: 03/27/25  5:59 PM    Specimen: Blood   Result Value Ref Range    Glucose 103 (H) 65 - 99 mg/dL    BUN 13 8 - 23 mg/dL    Creatinine 0.65 0.57 - 1.00 mg/dL    Sodium 140 136 - 145 mmol/L    Potassium 3.8 3.5 - 5.2 mmol/L    Chloride 104 98 - 107 mmol/L    CO2 26.0 22.0 - 29.0 mmol/L    Calcium 9.4 8.6 - 10.5 mg/dL    Total Protein 6.9 6.0 - 8.5 g/dL    Albumin 4.3 3.5 - 5.2 g/dL    ALT (SGPT) 21 1 - 33 U/L    AST (SGOT) 21 1 - 32 U/L    Alkaline Phosphatase 101 39 - 117 U/L    Total Bilirubin 0.4 0.0 - 1.2 mg/dL    Globulin 2.6 gm/dL    A/G Ratio 1.7 g/dL    BUN/Creatinine Ratio 20.0 7.0 - 25.0    Anion Gap 10.0 5.0 - 15.0 mmol/L    eGFR 99.7 >60.0 mL/min/1.73   Vitamin D 25 Hydroxy    Collection Time: 03/27/25  5:59 PM    Specimen: Blood   Result Value Ref Range    25 Hydroxy, Vitamin D 54.2 30.0 - 100.0 ng/ml   CBC Auto Differential    Collection Time: 03/27/25  5:59 PM    Specimen: Blood   Result Value Ref Range    WBC 6.51 3.40 - 10.80 10*3/mm3    RBC 4.56 3.77 - 5.28 10*6/mm3    Hemoglobin 12.1 12.0 - 15.9 g/dL    Hematocrit 39.1 34.0 - 46.6 %    MCV 85.7 79.0 - 97.0 fL    MCH 26.5 (L) 26.6 - 33.0 pg    MCHC 30.9 (L) 31.5 - 35.7 g/dL    RDW 16.0 (H) 12.3 - 15.4 %    RDW-SD 50.0 37.0 - 54.0 fl    MPV 8.2 6.0 - 12.0 fL    Platelets 390 140 - 450 10*3/mm3    Neutrophil % 34.9 (L) 42.7 - 76.0 %    Lymphocyte % 50.4 (H) 19.6 - 45.3 %    Monocyte % 11.4 5.0 - 12.0 %    Eosinophil % 2.6 0.3 - 6.2 %    Basophil % 0.5 0.0 - 1.5  %    Immature Grans % 0.2 0.0 - 0.5 %    Neutrophils, Absolute 2.28 1.70 - 7.00 10*3/mm3    Lymphocytes, Absolute 3.28 (H) 0.70 - 3.10 10*3/mm3    Monocytes, Absolute 0.74 0.10 - 0.90 10*3/mm3    Eosinophils, Absolute 0.17 0.00 - 0.40 10*3/mm3    Basophils, Absolute 0.03 0.00 - 0.20 10*3/mm3    Immature Grans, Absolute 0.01 0.00 - 0.05 10*3/mm3    nRBC 0.0 0.0 - 0.2 /100 WBC        Assessment and Plan  Diagnoses and all orders for this visit:    Chronic GERD  Hiatal hernia  History of Perez's esophagus  - ongoing intermittent sxs from GERD despite nexium daily and pepcid BID  - EGD with hiatal hernia  - discussed lifestyle and diet changes which she has already employed  - recommend increase PPI to BID when sxs are uncontrolled, okay to decrease back to daily after 2 week course     History of compression fracture of spine  - Hx of T7 compression fx in 2017 related to osteoporosis  - has intermittent pain in thoracic spine and treats with robaxin or tizandine PRN on rare occasion. Avoids NSAIDs due to crohns.    Prediabetes  - Update A1c  - discussed that medications like statins do increase risk of developing DM however there is benefit of remaining on statin as well.    Dermatitis  - in both axilla, has occurred twice  - appears like yeast but not pruritic  - try OTC antifungal     Health Maintenance  - Pap smear: s/p hysterectomy  - Mammogram: 7/2024 BIRADS 1  - Colonoscopy: 1/2025, repeat 5y  - HCV: negative  - Immunizations: PCV20 8/2023. COVID declined. Shingrix complete. Tdap 2/2020.   - Depression screening: negative 2/2024    No follow-ups on file.

## 2025-06-13 ENCOUNTER — TRANSCRIBE ORDERS (OUTPATIENT)
Dept: ADMINISTRATIVE | Facility: HOSPITAL | Age: 65
End: 2025-06-13
Payer: COMMERCIAL

## 2025-06-13 DIAGNOSIS — Z12.31 ENCOUNTER FOR SCREENING MAMMOGRAM FOR MALIGNANT NEOPLASM OF BREAST: Primary | ICD-10-CM

## 2025-07-18 ENCOUNTER — HOSPITAL ENCOUNTER (OUTPATIENT)
Dept: MAMMOGRAPHY | Facility: HOSPITAL | Age: 65
Discharge: HOME | End: 2025-07-18
Admitting: INTERNAL MEDICINE
Payer: COMMERCIAL

## 2025-07-18 DIAGNOSIS — Z12.31 ENCOUNTER FOR SCREENING MAMMOGRAM FOR MALIGNANT NEOPLASM OF BREAST: ICD-10-CM

## 2025-07-18 PROCEDURE — 77063 BREAST TOMOSYNTHESIS BI: CPT

## 2025-07-18 PROCEDURE — 77067 SCR MAMMO BI INCL CAD: CPT

## 2025-08-01 ENCOUNTER — OFFICE VISIT (OUTPATIENT)
Dept: INTERNAL MEDICINE | Facility: CLINIC | Age: 65
End: 2025-08-01
Payer: COMMERCIAL

## 2025-08-01 VITALS
RESPIRATION RATE: 14 BRPM | DIASTOLIC BLOOD PRESSURE: 74 MMHG | HEART RATE: 86 BPM | WEIGHT: 175 LBS | OXYGEN SATURATION: 98 % | HEIGHT: 62 IN | TEMPERATURE: 97.1 F | BODY MASS INDEX: 32.2 KG/M2 | SYSTOLIC BLOOD PRESSURE: 120 MMHG

## 2025-08-01 DIAGNOSIS — R05.1 ACUTE COUGH: Primary | ICD-10-CM

## 2025-08-01 PROCEDURE — 99213 OFFICE O/P EST LOW 20 MIN: CPT

## 2025-08-01 NOTE — ASSESSMENT & PLAN NOTE
- Vital signs stable, lungs clear to auscultation bilaterally.  Likely d/t PND. Rec Flonase and zyrtec.  May continue Mucinex DM and albuterol inhaler as needed.  Patient declines chest x-ray today.  She is encouraged to call office if she changes her mind or if symptoms persist.  - Return to the office if symptoms worsen or fail to improve with current plan

## 2025-08-01 NOTE — PROGRESS NOTES
Follow Up Office Visit      Date: 2025  Patient Name: Antonieta Howe  : 1960   MRN: [id number]     Chief Complaint:    Chief Complaint   Patient presents with    Cough       History of Present Illness: Antonieta Howe is a 64 y.o. female who is here today for evaluation of productive cough with clear sputum and congestion that onset over 3-weeks ago after COVID-19 infection. Has been using albuterol and mucinex DM. Cough increases with exertion.  No fevers or chills.  She was treated with doxycycline and prednisone on 2025 by urgent care.  Reports GERD is overall controlled.    Subjective      Review of Systems:   Review of Systems   Constitutional:  Negative for chills and fever.   HENT:  Positive for congestion and postnasal drip.    Respiratory:  Positive for cough. Negative for shortness of breath.    Cardiovascular:  Negative for chest pain, palpitations and leg swelling.   Gastrointestinal:  Negative for diarrhea, nausea and vomiting.       Medications:     Current Outpatient Medications:     albuterol (PROVENTIL) 2.5 MG/0.5ML nebulizer solution, Take 2.5 mg by nebulization Every 4 (Four) Hours As Needed for Wheezing., Disp: 20 mL, Rfl: 0    albuterol sulfate  (90 Base) MCG/ACT inhaler, Inhale 2 puffs Every 4 (Four) Hours As Needed for Wheezing., Disp: 18 g, Rfl: 0    azelastine (ASTEPRO) 0.15 % solution nasal spray, 1 spray into the nostril(s) as directed by provider 2 (Two) Times a Day., Disp: 30 mL, Rfl: 10    betamethasone, augmented, (DIPROLENE) 0.05 % cream, Apply topically to the appropriate area as directed 2 (Two) Times a Day for 14 days, then decrease to 2-3 times weekly, Disp: 50 g, Rfl: 1    Calcium Polycarbophil (FIBER-CAPS PO), Take  by mouth Daily., Disp: , Rfl:     clopidogrel (PLAVIX) 75 MG tablet, Take 1 tablet by mouth Daily., Disp: 90 tablet, Rfl: 3    Coenzyme Q10 (COQ10) 200 MG capsule, Take 1 capsule by mouth Take As Directed., Disp: , Rfl:      "denosumab (Prolia) 60 MG/ML solution prefilled syringe syringe, Inject 1 mL under the skin into the appropriate area as directed Every 6 (Six) Months., Disp: 1 mL, Rfl: 0    esomeprazole (nexIUM) 40 MG capsule, Take 1 capsule by mouth 2 (Two) Times a Day., Disp: 180 capsule, Rfl: 3    famotidine (PEPCID) 40 MG tablet, Take 1 tablet by mouth 2 (Two) Times a Day., Disp: 180 tablet, Rfl: 3    mesalamine (DELZICOL) 400 MG capsule delayed-release delayed release capsule, Take 2 capsules by mouth 2 (Two) Times a Day., Disp: 360 capsule, Rfl: 5    nebivolol (Bystolic) 5 MG tablet, Take 1 tablet by mouth Daily., Disp: 90 tablet, Rfl: 3    NIACIN PO, Take  by mouth., Disp: , Rfl:     Probiotic Product (PROBIOTIC ADVANCED PO), Take  by mouth Daily., Disp: , Rfl:     rosuvastatin (Crestor) 10 MG tablet, Take 1 tablet by mouth Daily., Disp: 90 tablet, Rfl: 3    tiZANidine (ZANAFLEX) 2 MG tablet, Take 0.5-1 tablet by mouth in the morning and afternoon and 1-2 tablets at bedtime, Disp: 90 tablet, Rfl: 3    vitamin D3 125 MCG (5000 UT) capsule capsule, Take  by mouth., Disp: , Rfl:     Allergies:   Allergies   Allergen Reactions    Erythromycin GI Intolerance    Levaquin [Levofloxacin] Other (See Comments)     Patient states she gets really bad plantar fasciitis    Vancomycin Other (See Comments)     Red man syndrome    Bactrim [Sulfamethoxazole-Trimethoprim] Itching and Rash    Beeswax Rash    Other Itching and Rash     Kapidex    Zosyn [Piperacillin Sod-Tazobactam So] Rash     Possibly due to vancomycin and not zosyn, administered concurrently.       Objective     Physical Exam:  Vital Signs:   Vitals:    07/31/25 1701   BP: 120/74   Pulse: 86   Resp: 14   Temp: 97.1 °F (36.2 °C)   TempSrc: Temporal   SpO2: 98%   Weight: 79.4 kg (175 lb)   Height: 157.5 cm (62.01\")   PainSc: 0-No pain     Body mass index is 32 kg/m².   Facility age limit for growth %janice is 20 years.      Physical Exam  Vitals and nursing note reviewed. "   Constitutional:       General: She is not in acute distress.     Appearance: Normal appearance.   HENT:      Mouth/Throat:      Pharynx: Postnasal drip present. No oropharyngeal exudate or posterior oropharyngeal erythema.   Eyes:      Extraocular Movements: Extraocular movements intact.      Conjunctiva/sclera: Conjunctivae normal.   Pulmonary:      Effort: Pulmonary effort is normal. No respiratory distress.      Breath sounds: No wheezing, rhonchi or rales.   Neurological:      General: No focal deficit present.      Mental Status: She is alert and oriented to person, place, and time. Mental status is at baseline.   Psychiatric:         Mood and Affect: Mood normal.         Behavior: Behavior normal.         POCT Results (if applicable):   Results for orders placed or performed in visit on 05/23/25   Hemoglobin A1c    Collection Time: 05/23/25  8:47 AM    Specimen: Blood   Result Value Ref Range    Hemoglobin A1C 6.00 (H) 4.80 - 5.60 %       Assessment / Plan      Assessment/Plan:      Acute cough  - Vital signs stable, lungs clear to auscultation bilaterally.  Likely d/t PND. Rec Flonase and zyrtec.  May continue Mucinex DM and albuterol inhaler as needed.  Patient declines chest x-ray today.  She is encouraged to call office if she changes her mind or if symptoms persist.  - Return to the office if symptoms worsen or fail to improve with current plan             Follow Up:   Return for Next scheduled follow up.      [facility] [place]

## 2025-08-22 ENCOUNTER — OFFICE VISIT (OUTPATIENT)
Age: 65
End: 2025-08-22
Payer: COMMERCIAL

## 2025-08-22 VITALS
DIASTOLIC BLOOD PRESSURE: 78 MMHG | TEMPERATURE: 97.5 F | HEIGHT: 62 IN | HEART RATE: 68 BPM | SYSTOLIC BLOOD PRESSURE: 130 MMHG | WEIGHT: 171.6 LBS | BODY MASS INDEX: 31.58 KG/M2

## 2025-08-22 DIAGNOSIS — K11.7 XEROSTOMIA: ICD-10-CM

## 2025-08-22 DIAGNOSIS — E55.9 VITAMIN D DEFICIENCY: ICD-10-CM

## 2025-08-22 DIAGNOSIS — M07.60 ENTEROPATHIC ARTHRITIS: ICD-10-CM

## 2025-08-22 DIAGNOSIS — K50.10 CROHN'S DISEASE OF COLON WITHOUT COMPLICATION: ICD-10-CM

## 2025-08-22 DIAGNOSIS — K50.919 CROHN'S DISEASE WITH COMPLICATION, UNSPECIFIED GASTROINTESTINAL TRACT LOCATION: ICD-10-CM

## 2025-08-22 DIAGNOSIS — M81.0 OSTEOPOROSIS, UNSPECIFIED OSTEOPOROSIS TYPE, UNSPECIFIED PATHOLOGICAL FRACTURE PRESENCE: Primary | ICD-10-CM

## 2025-08-22 DIAGNOSIS — R53.83 OTHER FATIGUE: ICD-10-CM

## 2025-08-22 DIAGNOSIS — Z79.899 HIGH RISK MEDICATION USE: ICD-10-CM

## 2025-08-22 PROCEDURE — 99214 OFFICE O/P EST MOD 30 MIN: CPT | Performed by: NURSE PRACTITIONER

## 2025-08-26 ENCOUNTER — SPECIALTY PHARMACY (OUTPATIENT)
Age: 65
End: 2025-08-26
Payer: COMMERCIAL

## 2025-08-27 ENCOUNTER — SPECIALTY PHARMACY (OUTPATIENT)
Dept: GENERAL RADIOLOGY | Facility: HOSPITAL | Age: 65
End: 2025-08-27
Payer: COMMERCIAL

## 2025-08-27 RX ORDER — DENOSUMAB 60 MG/ML
60 INJECTION SUBCUTANEOUS
Qty: 1 ML | Refills: 0 | Status: SHIPPED | OUTPATIENT
Start: 2025-08-27